# Patient Record
Sex: FEMALE | Race: WHITE | NOT HISPANIC OR LATINO | Employment: UNEMPLOYED | URBAN - METROPOLITAN AREA
[De-identification: names, ages, dates, MRNs, and addresses within clinical notes are randomized per-mention and may not be internally consistent; named-entity substitution may affect disease eponyms.]

---

## 2021-08-05 ENCOUNTER — OFFICE VISIT (OUTPATIENT)
Dept: LAB | Facility: HOSPITAL | Age: 51
End: 2021-08-05
Payer: COMMERCIAL

## 2021-08-05 ENCOUNTER — APPOINTMENT (OUTPATIENT)
Dept: LAB | Facility: HOSPITAL | Age: 51
End: 2021-08-05
Payer: COMMERCIAL

## 2021-08-05 ENCOUNTER — OFFICE VISIT (OUTPATIENT)
Dept: BARIATRICS | Facility: CLINIC | Age: 51
End: 2021-08-05
Payer: COMMERCIAL

## 2021-08-05 VITALS
BODY MASS INDEX: 45.99 KG/M2 | TEMPERATURE: 97.6 F | DIASTOLIC BLOOD PRESSURE: 108 MMHG | WEIGHT: 293 LBS | SYSTOLIC BLOOD PRESSURE: 146 MMHG | HEART RATE: 86 BPM | HEIGHT: 67 IN

## 2021-08-05 DIAGNOSIS — Z79.899 MEDICATION MANAGEMENT: ICD-10-CM

## 2021-08-05 DIAGNOSIS — Z91.89 AT RISK FOR SLEEP APNEA: ICD-10-CM

## 2021-08-05 DIAGNOSIS — E66.01 OBESITY, CLASS III, BMI 40-49.9 (MORBID OBESITY) (HCC): ICD-10-CM

## 2021-08-05 DIAGNOSIS — I10 ESSENTIAL HYPERTENSION: ICD-10-CM

## 2021-08-05 DIAGNOSIS — E66.01 OBESITY, CLASS III, BMI 40-49.9 (MORBID OBESITY) (HCC): Primary | ICD-10-CM

## 2021-08-05 LAB
ALBUMIN SERPL BCP-MCNC: 3.8 G/DL (ref 3.5–5)
ALP SERPL-CCNC: 64 U/L (ref 46–116)
ALT SERPL W P-5'-P-CCNC: 45 U/L (ref 12–78)
ANION GAP SERPL CALCULATED.3IONS-SCNC: 9 MMOL/L (ref 4–13)
AST SERPL W P-5'-P-CCNC: 22 U/L (ref 5–45)
BILIRUB SERPL-MCNC: 0.33 MG/DL (ref 0.2–1)
BUN SERPL-MCNC: 17 MG/DL (ref 5–25)
CALCIUM SERPL-MCNC: 8.4 MG/DL (ref 8.3–10.1)
CHLORIDE SERPL-SCNC: 105 MMOL/L (ref 100–108)
CHOLEST SERPL-MCNC: 307 MG/DL (ref 50–200)
CO2 SERPL-SCNC: 28 MMOL/L (ref 21–32)
CREAT SERPL-MCNC: 0.71 MG/DL (ref 0.6–1.3)
EST. AVERAGE GLUCOSE BLD GHB EST-MCNC: 94 MG/DL
GFR SERPL CREATININE-BSD FRML MDRD: 99 ML/MIN/1.73SQ M
GLUCOSE P FAST SERPL-MCNC: 146 MG/DL (ref 65–99)
HBA1C MFR BLD: 4.9 %
HDLC SERPL-MCNC: 51 MG/DL
INSULIN SERPL-ACNC: 39.9 MU/L (ref 3–25)
LDLC SERPL CALC-MCNC: 209 MG/DL (ref 0–100)
MAGNESIUM SERPL-MCNC: 1.9 MG/DL (ref 1.6–2.6)
NONHDLC SERPL-MCNC: 256 MG/DL
POTASSIUM SERPL-SCNC: 4.5 MMOL/L (ref 3.5–5.3)
PROT SERPL-MCNC: 8 G/DL (ref 6.4–8.2)
SODIUM SERPL-SCNC: 142 MMOL/L (ref 136–145)
TRIGL SERPL-MCNC: 233 MG/DL
TSH SERPL DL<=0.05 MIU/L-ACNC: 1.61 UIU/ML (ref 0.36–3.74)

## 2021-08-05 PROCEDURE — 99204 OFFICE O/P NEW MOD 45 MIN: CPT | Performed by: PHYSICIAN ASSISTANT

## 2021-08-05 PROCEDURE — 83735 ASSAY OF MAGNESIUM: CPT

## 2021-08-05 PROCEDURE — 1036F TOBACCO NON-USER: CPT | Performed by: PHYSICIAN ASSISTANT

## 2021-08-05 PROCEDURE — 3008F BODY MASS INDEX DOCD: CPT | Performed by: PHYSICIAN ASSISTANT

## 2021-08-05 PROCEDURE — 83525 ASSAY OF INSULIN: CPT

## 2021-08-05 PROCEDURE — 93005 ELECTROCARDIOGRAM TRACING: CPT

## 2021-08-05 PROCEDURE — 84443 ASSAY THYROID STIM HORMONE: CPT

## 2021-08-05 PROCEDURE — 80053 COMPREHEN METABOLIC PANEL: CPT

## 2021-08-05 PROCEDURE — 36415 COLL VENOUS BLD VENIPUNCTURE: CPT

## 2021-08-05 PROCEDURE — 83036 HEMOGLOBIN GLYCOSYLATED A1C: CPT

## 2021-08-05 PROCEDURE — 80061 LIPID PANEL: CPT

## 2021-08-05 RX ORDER — METOPROLOL SUCCINATE 50 MG/1
50 TABLET, EXTENDED RELEASE ORAL DAILY
COMMUNITY
End: 2022-02-01 | Stop reason: SDUPTHER

## 2021-08-05 NOTE — ASSESSMENT & PLAN NOTE
-Discussed options of Healthy[facility]-Intensive Lifestyle Intervention Program, Very Low Calorie Diet-VLCD, Conservative Program, Shaila-En-Y Gastric Bypass and Vertical Sleeve Gastrectomy and the role of weight loss medications   -Initial weight loss goal of 5-10% weight loss for improved health  -Screening labs  Recommend checking lab coverage before having labs drawn    -NEEDS Lipids, tsh, a1c, fasting insulin and cmp   - STOP BANG-4/8  -Patient is interested in pursuing Very Low Calorie Diet-VLCD    VLCD Review:  Contraindications: no  Labs ordered: yes  EKG ordered: yes  HTN meds addressed: will monitor prn   DM2 meds addressed: n/a  VLCD time restriction based on BMI: no

## 2021-08-05 NOTE — ASSESSMENT & PLAN NOTE
Taking toprol  -should improve with weight loss, dietary, and lifestyle changes  -continue management with prescribing provider

## 2021-08-05 NOTE — PROGRESS NOTES
Assessment/Plan:    Obesity, Class III, BMI 40-49 9 (morbid obesity) ([facility] [address] )  -Discussed options of Healthy[facility]-Intensive Lifestyle Intervention Program, Very Low Calorie Diet-VLCD, Conservative Program, Shaila-En-Y Gastric Bypass and Vertical Sleeve Gastrectomy and the role of weight loss medications   -Initial weight loss goal of 5-10% weight loss for improved health  -Screening labs  Recommend checking lab coverage before having labs drawn  -NEEDS Lipids, tsh, a1c, fasting insulin and cmp   - STOP BANG-  -Patient is interested in pursuing Very Low Calorie Diet-VLCD    VLCD Review:  Contraindications: no  Labs ordered: yes  EKG ordered: yes  HTN meds addressed: will monitor prn   DM2 meds addressed: n/a  VLCD time restriction based on BMI: no        At risk for sleep apnea  STOP BAN/8  - Patient declined sleep medicine referral  - will recheck in 3-4 months after weight loss has occured  -Discussed risks of untreated sleep apnea such as sudden cardiac death by arrhythmia, uncontrolled hypertension, difficulty with weight loss, decreased quality sleep, increased insulin resistance, and stroke  -Should improve with dietary and lifestyle changes        Essential hypertension  Taking toprol  -should improve with weight loss, dietary, and lifestyle changes  -continue management with prescribing provider        Follow up in approximately 2 weeks with Non-Surgical Dietician  Diagnoses and all orders for this visit:    Obesity, Class III, BMI 40-49 9 (morbid obesity) ([facility])  -     TSH, 3rd generation with Free T4 reflex; Future  -     Lipid panel; Future  -     HEMOGLOBIN A1C W/ EAG ESTIMATION; Future  -     Insulin, fasting; Future  -     Comprehensive metabolic panel; Future  -     Magnesium; Future  -     ECG 12 lead; Future    At risk for sleep apnea  -     TSH, 3rd generation with Free T4 reflex; Future  -     Lipid panel; Future  -     HEMOGLOBIN A1C W/ EAG ESTIMATION; Future  -     Insulin, fasting;  Future  - Comprehensive metabolic panel; Future  -     Magnesium; Future  -     ECG 12 lead; Future    Medication management  -     TSH, 3rd generation with Free T4 reflex; Future  -     Lipid panel; Future  -     HEMOGLOBIN A1C W/ EAG ESTIMATION; Future  -     Insulin, fasting; Future  -     Comprehensive metabolic panel; Future  -     Magnesium; Future  -     ECG 12 lead; Future    Essential hypertension    Other orders  -     metoprolol succinate (TOPROL-XL) 50 mg 24 hr tablet; Take 50 mg by mouth daily          Subjective:   Chief Complaint   Patient presents with    Consult     mwm follow up       Patient ID: Laxmi Fernandes  is a 45 y o  female with excess weight/obesity here to pursue weight management  History reviewed  No pertinent past medical history  HPI:  Obesity/Excess Weight:  Severity: Severe  Onset:  For her whole life but worse in the last 3 years caring for her  with als who has since passed     Modifiers: Diet and Exercise, Commercial Weight Loss Programs-ie  Weight Watchers, Annmarie Fraise, Nutrisystem, etc  and Over the Counter Weight Loss Supplements  Contributing factors: Poor Food Choices and Stress/Emotional Eating  Associated symptoms: comorbid conditions, fatigue, increased joint pain, decreased exercise capacity, decreased mobility and inability to do certain activities    Goals: be size 10/12  Hydration:  oz of water-adds crystal light occasionally   Alcohol: 2-3 drinks per week     Colonoscopy-Not Completed -getting referral to pcp     The following portions of the patient's history were reviewed and updated as appropriate: allergies, current medications, past family history, past medical history, past social history, past surgical history and problem list     Review of Systems   HENT: Negative for sore throat  Respiratory: Negative for cough and shortness of breath  Cardiovascular: Negative for chest pain and palpitations     Gastrointestinal: Negative for abdominal pain, constipation, diarrhea, nausea and vomiting  Denies GERD   Endocrine: Negative for cold intolerance and heat intolerance  Genitourinary: Negative for dysuria  Musculoskeletal: Positive for back pain  Negative for arthralgias  Skin: Negative for rash  Neurological: Negative for headaches  Psychiatric/Behavioral: Negative for suicidal ideas (denies HI)  Denies Depression and anxiety       Objective:    BP (!) 146/108 (BP Location: Left arm, Patient Position: Sitting, Cuff Size: Extra-Large)   Pulse 86   Temp 97 6 °F (36 4 °C)   Ht 5' 7" (1 702 m)   Wt (!) 139 kg (307 lb 3 2 oz)   BMI 48 11 kg/m²     Physical Exam  Vitals and nursing note reviewed  Constitutional   General appearance: Abnormal   well developed and morbidly obese  Eyes No conjunctival pallor  Pulmonary   Respiratory effort: No increased work of breathing or signs of respiratory distress  Abdomen   Abdomen: Abnormal   The abdomen was obese     Musculoskeletal   Gait and station: Normal     Psychiatric   Orientation to person, place and time: Normal     Affect: appropriate

## 2021-08-05 NOTE — ASSESSMENT & PLAN NOTE
STOP BAN/8  - Patient declined sleep medicine referral  - will recheck in 3-4 months after weight loss has occured  -Discussed risks of untreated sleep apnea such as sudden cardiac death by arrhythmia, uncontrolled hypertension, difficulty with weight loss, decreased quality sleep, increased insulin resistance, and stroke    -Should improve with dietary and lifestyle changes

## 2021-08-06 LAB
ATRIAL RATE: 66 BPM
P AXIS: 53 DEGREES
PR INTERVAL: 166 MS
QRS AXIS: 28 DEGREES
QRSD INTERVAL: 92 MS
QT INTERVAL: 410 MS
QTC INTERVAL: 429 MS
T WAVE AXIS: 23 DEGREES
VENTRICULAR RATE: 66 BPM

## 2021-08-06 PROCEDURE — 93010 ELECTROCARDIOGRAM REPORT: CPT | Performed by: INTERNAL MEDICINE

## 2021-08-09 ENCOUNTER — TELEPHONE (OUTPATIENT)
Dept: BARIATRICS | Facility: CLINIC | Age: 51
End: 2021-08-09

## 2021-08-12 ENCOUNTER — OFFICE VISIT (OUTPATIENT)
Dept: BARIATRICS | Facility: CLINIC | Age: 51
End: 2021-08-12

## 2021-08-12 VITALS — WEIGHT: 293 LBS | BODY MASS INDEX: 45.99 KG/M2 | HEIGHT: 67 IN

## 2021-08-12 DIAGNOSIS — R63.5 ABNORMAL WEIGHT GAIN: Primary | ICD-10-CM

## 2021-08-12 PROCEDURE — VLCD

## 2021-08-12 PROCEDURE — RECHECK

## 2021-08-12 NOTE — PROGRESS NOTES
Reviewed VLCD diet principles  Developed meal plan and reviewed product use  Ordered 2 week supply  Gave patient written VLCD education packet and left patient contact number  Will call contact patient in 2-3 days to assess tolerance  She plans on starting VLCD tomorrow

## 2021-08-14 NOTE — PROGRESS NOTES
Called patient to follow-up on tolerance of VLCD Diet  Patient states they are doing well  Tolerating meal replacements without difficulty  Consuming at least 80oz of water in addition to water used to mix replacements  Patient not experiencing constipation  She had a headache the first day or two, but feeling much better now  Will follow up in 2 weeks

## 2021-08-16 ENCOUNTER — OFFICE VISIT (OUTPATIENT)
Dept: BARIATRICS | Facility: CLINIC | Age: 51
End: 2021-08-16

## 2021-08-16 DIAGNOSIS — R63.5 ABNORMAL WEIGHT GAIN: Primary | ICD-10-CM

## 2021-08-16 PROCEDURE — RECHECK

## 2021-08-17 ENCOUNTER — RA CDI HCC (OUTPATIENT)
Dept: OTHER | Facility: HOSPITAL | Age: 51
End: 2021-08-17

## 2021-08-17 NOTE — PROGRESS NOTES
[facility], [place] [facility]  coding opportunities       Chart reviewed, no opportunity found: CHART REVIEWED, NO OPPORTUNITY FOUND                        Patients insurance company: [employer] (Medicare and Commercial for [employer] and [employer])

## 2021-08-17 NOTE — PROGRESS NOTES
Weight Management Medical Nutrition Assessment  Pt presented for a VLCD follow-up session  Today's weight is  294 6#  Pt has lost 11 8# in the past 2 weeks and overall has lost 11 8# in the past 2 weeks(3 9% TBW,  averaging   5 9#/per week)  Tolerating meal replacements without difficulty  Consuming at least 80oz of water in addition to the the water used to mix replacements  Patient not experiencing constipation  Labs ordered  Products ordered  Informed about sale day  Exercise guidelines explained  Reviewed keto snacks  Blood pressure taken  Will continue on VLCD  Patient seen by Medical Provider in past 6 months:  yes  Requested to schedule appointment with Medical Provider: No      Anthropometric Measurements  Start Weight (#): 306 4#  Current Weight (#): 294 6#  TBW % Change from start weight: 3 9%  Ideal Body Weight (#): 135#  Goal Weight (#): doesn't have one    Weight Loss History  Previous weight loss attempts: none    Food and Nutrition Related History  Wake up: 6:30AM   Bed Time: 10-10:30PM    Food Recall  Breakfast: (7-9 AM): meal replacement   Snack: (12PM): protein bar  Lunch: (4-5PM meal replacement  Snack: ---  Dinner: (7PM): meal replacement  Snack:---      Beverages: water  Volume of beverage intake: > oz water (crystal light)    Weekends: Same  Cravings: none  Trouble area of day: none, but did move daughter away to college so was hard while away    Frequency of Eating out: n/a  Food restrictions: none  Cooking: self   Food Shopping: self    Physical Activity Intake  Activity:None (first 2 wks of VLCD); Reviewed exercise guidelines and keto snack list -- plans on starting to walk  Frequency:n/a  Physical limitations/barriers to exercise: none    Estimated Needs  Energy  [address] [address] Energy Needs:  BMR : 1984 kcal     1-2# loss weekly sedentary:  2627-0478 kcal              1-2# loss weekly lightly active: 3509-8952 kcal  Maintenance calories for sedentary activity level: 2381 kcal  Protein: 74- 92 grams     (1 2-1 5g/kg IBW)  Fluid: 2148 ml     (35mL/kg IBW)    Nutrition Diagnosis  Yes;     Overweight/obesity  related to Excess energy intake as evidenced by  BMI more than normative standard for age and sex (obesity-grade III 36+)       Nutrition Intervention    Nutrition Prescription  760 calories/ 43 net carbs using 3 meal replacements and 1 bar daily       Meal Plan (Sabas/Pro/Carb)  Breakfast: (7-9 AM): meal replacement   Snack: (12PM): protein bar  Lunch: (4-5PM meal replacement  Snack: ---  Dinner: (7PM): meal replacement  Snack:---    Nutrition Education:    Healthy Core Manual  Calorie controlled menu  Lean protein food choices  Healthy snack options  Food journaling tips      Nutrition Counseling:  Strategies: meal planning, portion sizes, healthy snack choices, hydration, fiber intake, protein intake, exercise, food journal      Monitoring and Evaluation:  Evaluation criteria:  Energy Intake  Meet protein needs  Maintain adequate hydration  Monitor weekly weight  Meal planning/preparation  Food journal   Decreased portions at mealtimes and snacks  Physical activity     Barriers to learning:none  Readiness to change: Action:  (Changing behavior)  Comprehension: very good  Expected Compliance: very good

## 2021-08-26 ENCOUNTER — OFFICE VISIT (OUTPATIENT)
Dept: BARIATRICS | Facility: CLINIC | Age: 51
End: 2021-08-26

## 2021-08-26 VITALS
BODY MASS INDEX: 45.99 KG/M2 | HEIGHT: 67 IN | DIASTOLIC BLOOD PRESSURE: 80 MMHG | SYSTOLIC BLOOD PRESSURE: 124 MMHG | WEIGHT: 293 LBS

## 2021-08-26 DIAGNOSIS — R63.5 ABNORMAL WEIGHT GAIN: Primary | ICD-10-CM

## 2021-08-26 PROCEDURE — RECHECK

## 2021-08-26 PROCEDURE — 3008F BODY MASS INDEX DOCD: CPT | Performed by: PHYSICIAN ASSISTANT

## 2021-08-26 PROCEDURE — VLCD

## 2021-09-09 ENCOUNTER — OFFICE VISIT (OUTPATIENT)
Dept: BARIATRICS | Facility: CLINIC | Age: 51
End: 2021-09-09

## 2021-09-09 VITALS
SYSTOLIC BLOOD PRESSURE: 140 MMHG | DIASTOLIC BLOOD PRESSURE: 78 MMHG | BODY MASS INDEX: 45.99 KG/M2 | WEIGHT: 293 LBS | HEIGHT: 67 IN

## 2021-09-09 DIAGNOSIS — R63.5 ABNORMAL WEIGHT GAIN: Primary | ICD-10-CM

## 2021-09-09 PROCEDURE — RECHECK

## 2021-09-09 PROCEDURE — 3008F BODY MASS INDEX DOCD: CPT | Performed by: PHYSICIAN ASSISTANT

## 2021-09-09 PROCEDURE — VLCD

## 2021-09-09 NOTE — PROGRESS NOTES
Weight Management Medical Nutrition Assessment  Pt presented for a VLCD follow-up session  Today's weight is  300 9#  Pt has gained  6 3# in the past 2 weeks and overall has lost 5 5# in the past 4 weeks(1 8% TBW,  averaging   ---#/per week)  She admits that she "went off the rails" when she went away for a few days  She started program again yesterday and would like to try again  She was tolerating meal replacements, drinking adequate fluid and no constipation/diarrhea  She did not complete labs, but will today  Products ordered  No exercise  Blood pressure taken, elevated, on medication  Will try again on VLCD      Patient seen by Medical Provider in past 6 months:  yes  Requested to schedule appointment with Medical Provider: No        Anthropometric Measurements  Start Weight (#): 306 4#  Current Weight (#): 300 9#  TBW % Change from start weight: 1 8%  Ideal Body Weight (#): 135#  Goal Weight (#): doesn't have one     Weight Loss History  Previous weight loss attempts: none     Food and Nutrition Related History  Wake up: 6:30AM         Bed Time: 10-10:30PM     Food Recall  Breakfast: (7:30 AM): meal replacement           Snack: (12PM): protein bar  Lunch: (4-5PM meal replacement  Snack: ---  Dinner: (8PM): meal replacement  Snack:---        Beverages: water  Volume of beverage intake: > oz water (crystal light)     Weekends: Same  Cravings: none  Trouble area of day: none, but did move daughter away to college so was hard while away     Frequency of Eating out: n/a  Food restrictions: none  Cooking: self   Food Shopping: self     Physical Activity Intake  Activity: no exercise, encouraged walking  Frequency:n/a  Physical limitations/barriers to exercise: none     Estimated Needs  Energy  [employer] [place] Energy Needs:  BMR : 2012 kcal     1-2# loss weekly sedentary:  5165-5711 kcal              1-2# loss weekly lightly active: 3972-2908 kcal  Maintenance calories for sedentary activity level: 2415 kcal  Protein: 74- 92 grams     (1 2-1 5g/kg IBW)  Fluid: 2148 ml     (35mL/kg IBW)     Nutrition Diagnosis  Yes;     Overweight/obesity  related to Excess energy intake as evidenced by  BMI more than normative standard for age and sex (obesity-grade III 36+)     Nutrition Intervention     Nutrition Prescription  760 calories/ 43 net carbs using 3 meal replacements and 1 bar daily         Meal Plan (Sabas/Pro/Carb)  Breakfast: (7-9 AM): meal replacement           Snack: (12PM): protein bar  Lunch: (4-5PM meal replacement  Snack: ---  Dinner: (7PM): meal replacement  Snack:---     Nutrition Education:    Healthy Core Manual  Calorie controlled menu  Lean protein food choices  Healthy snack options  Food journaling tips        Nutrition Counseling:  Strategies: meal planning, portion sizes, healthy snack choices, hydration, fiber intake, protein intake, exercise, food journal        Monitoring and Evaluation:  Evaluation criteria:  Energy Intake  Meet protein needs  Maintain adequate hydration  Monitor weekly weight  Meal planning/preparation  Food journal   Decreased portions at mealtimes and snacks  Physical activity      Barriers to learning:none  Readiness to change: Action:  (Changing behavior)  Comprehension: very good  Expected Compliance: very good

## 2022-01-19 ENCOUNTER — TELEPHONE (OUTPATIENT)
Dept: WOUND CARE | Facility: HOSPITAL | Age: 52
End: 2022-01-19

## 2022-01-19 NOTE — TELEPHONE ENCOUNTER
Looks like pt made appt online fopr new pt appt and it says new pt under reason    Can we call pt to see the nature of her appt

## 2022-02-01 ENCOUNTER — OFFICE VISIT (OUTPATIENT)
Dept: FAMILY MEDICINE CLINIC | Facility: CLINIC | Age: 52
End: 2022-02-01
Payer: COMMERCIAL

## 2022-02-01 VITALS
RESPIRATION RATE: 20 BRPM | OXYGEN SATURATION: 98 % | HEART RATE: 82 BPM | TEMPERATURE: 96.7 F | DIASTOLIC BLOOD PRESSURE: 96 MMHG | BODY MASS INDEX: 47.09 KG/M2 | SYSTOLIC BLOOD PRESSURE: 150 MMHG | WEIGHT: 293 LBS | HEIGHT: 66 IN

## 2022-02-01 DIAGNOSIS — Z00.00 WELL ADULT EXAM: Primary | ICD-10-CM

## 2022-02-01 DIAGNOSIS — Z11.59 NEED FOR HEPATITIS C SCREENING TEST: ICD-10-CM

## 2022-02-01 DIAGNOSIS — Z12.31 ENCOUNTER FOR SCREENING MAMMOGRAM FOR MALIGNANT NEOPLASM OF BREAST: ICD-10-CM

## 2022-02-01 DIAGNOSIS — Z12.11 SCREENING FOR COLON CANCER: ICD-10-CM

## 2022-02-01 DIAGNOSIS — F43.21 GRIEF: ICD-10-CM

## 2022-02-01 DIAGNOSIS — Z23 NEED FOR VACCINATION: ICD-10-CM

## 2022-02-01 DIAGNOSIS — I10 ESSENTIAL HYPERTENSION: ICD-10-CM

## 2022-02-01 DIAGNOSIS — Z12.4 SCREENING FOR CERVICAL CANCER: ICD-10-CM

## 2022-02-01 DIAGNOSIS — E66.01 OBESITY, CLASS III, BMI 40-49.9 (MORBID OBESITY) (HCC): ICD-10-CM

## 2022-02-01 DIAGNOSIS — Z13.6 SCREENING FOR CARDIOVASCULAR CONDITION: ICD-10-CM

## 2022-02-01 DIAGNOSIS — E66.01 MORBID OBESITY WITH BMI OF 45.0-49.9, ADULT (HCC): ICD-10-CM

## 2022-02-01 PROBLEM — Z91.89 AT RISK FOR SLEEP APNEA: Status: RESOLVED | Noted: 2021-08-05 | Resolved: 2022-02-01

## 2022-02-01 PROCEDURE — 3008F BODY MASS INDEX DOCD: CPT | Performed by: FAMILY MEDICINE

## 2022-02-01 PROCEDURE — 90471 IMMUNIZATION ADMIN: CPT

## 2022-02-01 PROCEDURE — 90715 TDAP VACCINE 7 YRS/> IM: CPT

## 2022-02-01 PROCEDURE — 99386 PREV VISIT NEW AGE 40-64: CPT | Performed by: FAMILY MEDICINE

## 2022-02-01 PROCEDURE — 1036F TOBACCO NON-USER: CPT | Performed by: FAMILY MEDICINE

## 2022-02-01 RX ORDER — AMLODIPINE BESYLATE 5 MG/1
5 TABLET ORAL DAILY
Qty: 90 TABLET | Refills: 1 | Status: SHIPPED | OUTPATIENT
Start: 2022-02-01 | End: 2022-07-18 | Stop reason: SDUPTHER

## 2022-02-01 RX ORDER — METOPROLOL SUCCINATE 50 MG/1
50 TABLET, EXTENDED RELEASE ORAL DAILY
Qty: 90 TABLET | Refills: 1 | Status: SHIPPED | OUTPATIENT
Start: 2022-02-01 | End: 2022-07-18 | Stop reason: SDUPTHER

## 2022-02-01 NOTE — PATIENT INSTRUCTIONS
Obesity   AMBULATORY CARE:   Obesity  is when your body mass index (BMI) is greater than 30  Your healthcare provider will use your height and weight to measure your BMI  The risks of obesity include  many health problems, including injuries or physical disability  You may need tests to check for the following:  · Diabetes    · High blood pressure or high cholesterol    · Heart disease    · Stroke    · Gallbladder or liver disease    · Cancer of the colon, breast, prostate, liver, or kidney    · Sleep apnea    · Arthritis or gout    Seek care immediately if:   · You have a severe headache, confusion, or difficulty speaking  · You have weakness on one side of your body  · You have chest pain, sweating, or shortness of breath  Call your doctor if:   · You have symptoms of gallbladder or liver disease, such as pain in your upper abdomen  · You have knee or hip pain and discomfort while walking  · You have symptoms of diabetes, such as intense hunger and thirst, and frequent urination  · You have symptoms of sleep apnea, such as snoring or daytime sleepiness  · You have questions or concerns about your condition or care  Treatment for obesity  focuses on helping you lose weight to improve your health  Even a small decrease in BMI can reduce the risk for many health problems  Your healthcare provider will help you set a weight-loss goal   · Lifestyle changes  are the first step in treating obesity  These include making healthy food choices and getting regular physical activity  Your healthcare provider may suggest a weight-loss program that involves coaching, education, and therapy  · Medicine  may help you lose weight when it is used with a healthy foods and physical activity  · Surgery  can help you lose weight if you are very obese and have other health problems  There are several types of weight-loss surgery  Ask your healthcare provider for more information      Be successful losing weight:   · Set small, realistic goals  An example of a small goal is to walk for 20 minutes 5 days a week  Anther goal is to lose 5% of your body weight  · Tell friends, family members, and coworkers about your goals  and ask for their support  Ask a friend to lose weight with you, or join a weight-loss support group  · Identify foods or triggers that may cause you to overeat , and find ways to avoid them  Remove tempting high-calorie foods from your home and workplace  Place a bowl of fresh fruit on your kitchen counter  If stress causes you to eat, then find other ways to cope with stress  A counselor or therapist may be able to help you  · Keep a diary to track what you eat and drink  Also write down how many minutes of physical activity you do each day  Weigh yourself once a week and record it in your diary  Eating changes: You will need to eat 500 to 1,000 fewer calories each day than you currently eat to lose 1 to 2 pounds a week  The following changes will help you cut calories:  · Eat smaller portions  Use small plates, no larger than 9 inches in diameter  Fill your plate half full of fruits and vegetables  Measure your food using measuring cups until you know what a serving size looks like  · Eat 3 meals and 1 or 2 snacks each day  Plan your meals in advance  Martha [place] and eat at home most of the time  Eat slowly  Do not skip meals  Skipping meals can lead to overeating later in the day  This can make it harder for you to lose weight  Talk with a dietitian to help you make a meal plan and schedule that is right for you  · Eat fruits and vegetables at every meal   They are low in calories and high in fiber, which makes you feel full  Do not add butter, margarine, or cream sauce to vegetables  Use herbs to season steamed vegetables  · Eat less fat and fewer fried foods  Eat more baked or grilled chicken and fish  These protein sources are lower in calories and fat than red meat  Limit fast food  Dress your salads with olive oil and vinegar instead of bottled dressing  · Limit the amount of sugar you eat  Do not drink sugary beverages  Limit alcohol  Activity changes:  Physical activity is good for your body in many ways  It helps you burn calories and build strong muscles  It decreases stress and depression, and improves your mood  It can also help you sleep better  Talk to your healthcare provider before you begin an exercise program   · Exercise for at least 30 minutes 5 days a week  Start slowly  Set aside time each day for physical activity that you enjoy and that is convenient for you  It is best to do both weight training and an activity that increases your heart rate, such as walking, bicycling, or swimming  · Find ways to be more active  Do yard work and housecleaning  Walk up the stairs instead of using elevators  Spend your leisure time going to events that require walking, such as outdoor festivals or fairs  This extra physical activity can help you lose weight and keep it off  Follow up with your doctor as directed: You may need to meet with a dietitian  Write down your questions so you remember to ask them during your visits  © Copyright [address] 2021 Information is for End User's use only and may not be sold, redistributed or otherwise used for commercial purposes  All illustrations and images included in CareNotes® are the copyrighted property of [employer]  or [employer] Is[employer]  The above information is an  only  It is not intended as medical advice for individual conditions or treatments  Talk to your doctor, nurse or pharmacist before following any medical regimen to see if it is safe and effective for you

## 2022-02-01 NOTE — PROGRESS NOTES
FAMILY PRACTICE HEALTH MAINTENANCE OFFICE VISIT  [facility]    NAME: Larissa Mckeon  AGE: 45 y o  SEX: female  : 1970     DATE: 2022    Assessment and Plan     1  Well adult exam    2  Encounter for screening mammogram for malignant neoplasm of breast  -     Mammo screening bilateral w 3d & cad; Future; Expected date: 2022    3  Screening for colon cancer  -     Ambulatory referral to Gastroenterology; Future    4  Essential hypertension  -     metoprolol succinate (TOPROL-XL) 50 mg 24 hr tablet; Take 1 tablet (50 mg total) by mouth daily  -     amLODIPine (NORVASC) 5 mg tablet; Take 1 tablet (5 mg total) by mouth daily    5  Obesity, Class III, BMI 40-49 9 (morbid obesity) ([facility] [address] )  -     Ambulatory referral to Bariatric Surgery; Future    6  Morbid obesity with BMI of 45 0-49 9, adult [facility])  -     Ambulatory referral to Bariatric Surgery; Future    7  Need for hepatitis C screening test  -     Hepatitis C antibody; Future    8  Screening for cardiovascular condition  -     Comprehensive metabolic panel; Future  -     Lipid Panel with Direct LDL reflex; Future    9  Grief    10  Need for vaccination  -     TDAP VACCINE GREATER THAN OR EQUAL TO 8YO IM    11  Screening for cervical cancer  -     Ambulatory referral to Obstetrics / Gynecology; Future        · Patient Counseling:   · Nutrition: Stressed importance of a well balanced diet, moderation of sodium/saturated fat, caloric balance and sufficient intake of fiber  · Exercise: Stressed the importance of regular exercise with a goal of 150 minutes per week  · Dental Health: Discussed daily flossing and brushing and regular dental visits     · Immunizations reviewed: See Orders  · Discussed benefits of:  Mammogram , Cervical Cancer screening and Screening labs   BMI Counseling: Body mass index is 49 07 kg/m²  Discussed with patient's BMI with her   The BMI is above normal  Nutrition recommendations include reducing portion sizes  Return in about 4 weeks (around 3/1/2022) for Recheck BP and labs  Chief Complaint     Chief Complaint   Patient presents with    Physical Exam     [address] [facility],[address] Patient Visit       History of Present Illness     Pt is here for the fisrt time sched for a full physical      Well Adult Physical   Patient here for a comprehensive physical exam       Diet and Physical Activity  Diet: well balanced diet  Exercise: never      Depression Screen  PHQ-2/9 Depression Screening    Little interest or pleasure in doing things: 3 - nearly every day  Feeling down, depressed, or hopeless: 3 - nearly every day  Trouble falling or staying asleep, or sleeping too much: 3 - nearly every day  Feeling tired or having little energy: 3 - nearly every day  Poor appetite or overeating: 3 - nearly every day  Feeling bad about yourself - or that you are a failure or have let yourself or your family down: 3 - nearly every day  Trouble concentrating on things, such as reading the newspaper or watching television: 3 - nearly every day  Moving or speaking so slowly that other people could have noticed  Or the opposite - being so fidgety or restless that you have been moving around a lot more than usual: 0 - not at all  Thoughts that you would be better off dead, or of hurting yourself in some way: 0 - not at all  PHQ-2 Score: 6  PHQ-2 Interpretation: POSITIVE depression screen  PHQ-9 Score: 21   PHQ-9 Interpretation: Severe depression           General Health  Hearing: Normal:  bilateral  Vision: wears glasses and wears contacts  Dental: regular dental visits    Reproductive Health  No issues       The following portions of the patient's history were reviewed and updated as appropriate: allergies, current medications, past family history, past medical history, past social history, past surgical history and problem list     Review of Systems     Review of Systems   Constitutional: Negative    Negative for activity change, appetite change, chills, diaphoresis and fatigue  HENT: Negative  Negative for dental problem, ear pain, sinus pressure and sore throat  Eyes: Negative  Negative for photophobia, pain, discharge, redness, itching and visual disturbance  Respiratory: Negative for apnea and chest tightness  Cardiovascular: Negative  Negative for chest pain, palpitations and leg swelling  Gastrointestinal: Negative  Negative for abdominal distention, abdominal pain, constipation and diarrhea  Endocrine: Negative  Negative for cold intolerance and heat intolerance  Genitourinary: Negative  Negative for difficulty urinating and dyspareunia  Musculoskeletal: Negative  Negative for arthralgias and back pain  Skin: Negative  Allergic/Immunologic: Negative for environmental allergies  Neurological: Negative  Negative for dizziness  Psychiatric/Behavioral: Negative  Negative for agitation  Past Medical History     History reviewed  No pertinent past medical history  Past Surgical History     Past Surgical History:   Procedure Laterality Date     SECTION      TONSILLECTOMY         Social History     Social History     Socioeconomic History    Marital status:       Spouse name: None    Number of children: None    Years of education: None    Highest education level: None   Occupational History    None   Tobacco Use    Smoking status: Never Smoker    Smokeless tobacco: Never Used   Vaping Use    Vaping Use: Never used   Substance and Sexual Activity    Alcohol use: None    Drug use: None    Sexual activity: None   Other Topics Concern    None   Social History Narrative    None     Social Determinants of Health     Financial Resource Strain: Not on file   Food Insecurity: Not on file   Transportation Needs: Not on file   Physical Activity: Not on file   Stress: Not on file   Social Connections: Not on file   Intimate Partner Violence: Not on file   Housing Stability: Not on file       Family History     Family History   Problem Relation Age of Onset    Hypertension Mother     Stroke Mother     Diabetes Father     Hypertension Father     Heart disease Father     Thyroid disease Neg Hx        Current Medications       Current Outpatient Medications:     metoprolol succinate (TOPROL-XL) 50 mg 24 hr tablet, Take 1 tablet (50 mg total) by mouth daily, Disp: 90 tablet, Rfl: 1    amLODIPine (NORVASC) 5 mg tablet, Take 1 tablet (5 mg total) by mouth daily, Disp: 90 tablet, Rfl: 1     Allergies     No Known Allergies    Objective     /96   Pulse 82   Temp (!) 96 7 °F (35 9 °C)   Resp 20   Ht 5' 6" (1 676 m)   Wt (!) 138 kg (304 lb)   SpO2 98%   BMI 49 07 kg/m²      Physical Exam  Vitals and nursing note reviewed  Constitutional:       General: She is not in acute distress  Appearance: She is well-developed  She is not diaphoretic  HENT:      Head: Normocephalic and atraumatic  Right Ear: External ear normal       Left Ear: External ear normal       Nose: Nose normal       Mouth/Throat:      Pharynx: No oropharyngeal exudate  Eyes:      General: No scleral icterus  Right eye: No discharge  Left eye: No discharge  Pupils: Pupils are equal, round, and reactive to light  Neck:      Thyroid: No thyromegaly  Cardiovascular:      Rate and Rhythm: Normal rate  Heart sounds: Normal heart sounds  No murmur heard  Pulmonary:      Effort: Pulmonary effort is normal  No respiratory distress  Breath sounds: Normal breath sounds  No wheezing  Abdominal:      General: Bowel sounds are normal  There is no distension  Palpations: Abdomen is soft  There is no mass  Tenderness: There is no abdominal tenderness  There is no guarding or rebound  Musculoskeletal:         General: Normal range of motion  Skin:     General: Skin is warm and dry  Findings: No erythema or rash     Neurological:      Mental Status: She is alert  Coordination: Coordination normal       Deep Tendon Reflexes: Reflexes normal    Psychiatric:         Behavior: Behavior normal             Visual Acuity Screening    Right eye Left eye Both eyes   Without correction:      With correction: 20/30 20/30 20/30           Andrea Cesar DO  [address]  BMI Counseling: Body mass index is 49 07 kg/m²  The BMI is above normal  Nutrition recommendations include reducing portion sizes

## 2022-02-22 ENCOUNTER — RA CDI HCC (OUTPATIENT)
Dept: OTHER | Facility: HOSPITAL | Age: 52
End: 2022-02-22

## 2022-02-22 NOTE — PROGRESS NOTES
[place] [id number]  coding opportunities             Chart Reviewed * (Number of) Sharita suggestions sent to Provider: 1      E66 01 Obesity, Class III, BMI 40-49 9 (morbid obesity) ([place] [address] )  *BMI>40     If this is correct, please document and assess at your next visit,3/1/2022            Patients insurance company: [address] [place] [address] (Medicare and Commercial for [employer] and [facility])

## 2022-03-07 ENCOUNTER — RA CDI HCC (OUTPATIENT)
Dept: OTHER | Facility: HOSPITAL | Age: 52
End: 2022-03-07

## 2022-03-17 ENCOUNTER — TELEPHONE (OUTPATIENT)
Dept: OTHER | Facility: OTHER | Age: 52
End: 2022-03-17

## 2022-03-17 NOTE — TELEPHONE ENCOUNTER
Appointment cancelled by patient  Patient advised to call back during normal office hours to reschedule

## 2022-03-30 ENCOUNTER — LAB (OUTPATIENT)
Dept: LAB | Facility: CLINIC | Age: 52
End: 2022-03-30
Payer: COMMERCIAL

## 2022-03-30 DIAGNOSIS — Z13.6 SCREENING FOR CARDIOVASCULAR CONDITION: ICD-10-CM

## 2022-03-30 DIAGNOSIS — Z11.59 NEED FOR HEPATITIS C SCREENING TEST: ICD-10-CM

## 2022-03-30 LAB
ALBUMIN SERPL BCP-MCNC: 3.9 G/DL (ref 3.5–5)
ALP SERPL-CCNC: 90 U/L (ref 46–116)
ALT SERPL W P-5'-P-CCNC: 39 U/L (ref 12–78)
ANION GAP SERPL CALCULATED.3IONS-SCNC: 6 MMOL/L (ref 4–13)
AST SERPL W P-5'-P-CCNC: 19 U/L (ref 5–45)
BILIRUB SERPL-MCNC: 0.32 MG/DL (ref 0.2–1)
BUN SERPL-MCNC: 16 MG/DL (ref 5–25)
CALCIUM SERPL-MCNC: 8.9 MG/DL (ref 8.3–10.1)
CHLORIDE SERPL-SCNC: 105 MMOL/L (ref 100–108)
CHOLEST SERPL-MCNC: 332 MG/DL
CO2 SERPL-SCNC: 26 MMOL/L (ref 21–32)
CREAT SERPL-MCNC: 0.68 MG/DL (ref 0.6–1.3)
GFR SERPL CREATININE-BSD FRML MDRD: 100 ML/MIN/1.73SQ M
GLUCOSE P FAST SERPL-MCNC: 210 MG/DL (ref 65–99)
HCV AB SER QL: NORMAL
HDLC SERPL-MCNC: 45 MG/DL
LDLC SERPL DIRECT ASSAY-MCNC: 180 MG/DL (ref 0–100)
POTASSIUM SERPL-SCNC: 4.2 MMOL/L (ref 3.5–5.3)
PROT SERPL-MCNC: 8.1 G/DL (ref 6.4–8.2)
SODIUM SERPL-SCNC: 137 MMOL/L (ref 136–145)
TRIGL SERPL-MCNC: 515 MG/DL

## 2022-03-30 PROCEDURE — 80053 COMPREHEN METABOLIC PANEL: CPT

## 2022-03-30 PROCEDURE — 83721 ASSAY OF BLOOD LIPOPROTEIN: CPT

## 2022-03-30 PROCEDURE — 36415 COLL VENOUS BLD VENIPUNCTURE: CPT

## 2022-03-30 PROCEDURE — 80061 LIPID PANEL: CPT

## 2022-03-30 PROCEDURE — 86803 HEPATITIS C AB TEST: CPT

## 2022-04-04 ENCOUNTER — OFFICE VISIT (OUTPATIENT)
Dept: FAMILY MEDICINE CLINIC | Facility: CLINIC | Age: 52
End: 2022-04-04
Payer: COMMERCIAL

## 2022-04-04 VITALS
HEIGHT: 66 IN | DIASTOLIC BLOOD PRESSURE: 90 MMHG | RESPIRATION RATE: 18 BRPM | BODY MASS INDEX: 47.09 KG/M2 | OXYGEN SATURATION: 98 % | SYSTOLIC BLOOD PRESSURE: 134 MMHG | WEIGHT: 293 LBS | HEART RATE: 78 BPM | TEMPERATURE: 98 F

## 2022-04-04 DIAGNOSIS — R73.09 ABNORMAL GLUCOSE: ICD-10-CM

## 2022-04-04 DIAGNOSIS — E78.2 MIXED HYPERLIPIDEMIA: ICD-10-CM

## 2022-04-04 DIAGNOSIS — I10 ESSENTIAL HYPERTENSION: Primary | ICD-10-CM

## 2022-04-04 PROCEDURE — 3725F SCREEN DEPRESSION PERFORMED: CPT | Performed by: FAMILY MEDICINE

## 2022-04-04 PROCEDURE — 99214 OFFICE O/P EST MOD 30 MIN: CPT | Performed by: FAMILY MEDICINE

## 2022-04-04 RX ORDER — ROSUVASTATIN CALCIUM 20 MG/1
20 TABLET, COATED ORAL DAILY
Qty: 90 TABLET | Refills: 1 | Status: SHIPPED | OUTPATIENT
Start: 2022-04-04 | End: 2022-06-28 | Stop reason: SDUPTHER

## 2022-04-04 RX ORDER — LISINOPRIL 10 MG/1
10 TABLET ORAL DAILY
Qty: 90 TABLET | Refills: 1 | Status: SHIPPED | OUTPATIENT
Start: 2022-04-04

## 2022-04-04 RX ORDER — ROSUVASTATIN CALCIUM 20 MG/1
20 TABLET, COATED ORAL DAILY
Qty: 30 TABLET | Refills: 5 | Status: SHIPPED | OUTPATIENT
Start: 2022-04-04 | End: 2022-04-04 | Stop reason: SDUPTHER

## 2022-04-04 NOTE — PROGRESS NOTES
Assessment/Plan:    1  Essential hypertension  -     lisinopril (ZESTRIL) 10 mg tablet; Take 1 tablet (10 mg total) by mouth daily    2  Abnormal glucose  -     Hemoglobin A1C; Future    3  Mixed hyperlipidemia  -     rosuvastatin (CRESTOR) 20 MG tablet; Take 1 tablet (20 mg total) by mouth daily  -     Comprehensive metabolic panel; Future; Expected date: 06/18/2022  -     Lipid Panel with Direct LDL reflex; Future; Expected date: 06/18/2022    Pt has an elevated fasting sugar - I will get an a1c  Lipoid meds started for cholesterol and I added an ace to bp meds  Pt advised on limiting carbs as well as fatty foods          There are no Patient Instructions on file for this visit  Return in about 3 months (around 7/4/2022) for Recheck bp and labs  Subjective:      Patient ID: Leif Serna is a 45 y o  female  Chief Complaint   Patient presents with    Follow-up     lab work  Shelley Coe       Pt is here to follow BP and labs  Pt states she feels well      The following portions of the patient's history were reviewed and updated as appropriate: allergies, current medications, past family history, past medical history, past social history, past surgical history and problem list     Review of Systems   Constitutional: Negative  Negative for activity change, appetite change, chills, diaphoresis and fatigue  HENT: Negative  Negative for dental problem, ear pain, sinus pressure and sore throat  Eyes: Negative  Negative for photophobia, pain, discharge, redness, itching and visual disturbance  Respiratory: Negative for apnea and chest tightness  Cardiovascular: Negative  Negative for chest pain, palpitations and leg swelling  Gastrointestinal: Negative  Negative for abdominal distention, abdominal pain, constipation and diarrhea  Endocrine: Negative  Negative for cold intolerance and heat intolerance  Genitourinary: Negative  Negative for difficulty urinating and dyspareunia  Musculoskeletal: Negative  Negative for arthralgias and back pain  Skin: Negative  Allergic/Immunologic: Negative for environmental allergies  Neurological: Negative  Negative for dizziness  Psychiatric/Behavioral: Negative  Negative for agitation  Current Outpatient Medications   Medication Sig Dispense Refill    amLODIPine (NORVASC) 5 mg tablet Take 1 tablet (5 mg total) by mouth daily 90 tablet 1    metoprolol succinate (TOPROL-XL) 50 mg 24 hr tablet Take 1 tablet (50 mg total) by mouth daily 90 tablet 1    lisinopril (ZESTRIL) 10 mg tablet Take 1 tablet (10 mg total) by mouth daily 90 tablet 1    rosuvastatin (CRESTOR) 20 MG tablet Take 1 tablet (20 mg total) by mouth daily 90 tablet 1     No current facility-administered medications for this visit  Objective:    /90   Pulse 78   Temp 98 °F (36 7 °C)   Resp 18   Ht 5' 6" (1 676 m)   Wt (!) 142 kg (312 lb)   SpO2 98%   BMI 50 36 kg/m²        Physical Exam  Vitals and nursing note reviewed  Constitutional:       General: She is not in acute distress  Appearance: She is well-developed  She is not diaphoretic  HENT:      Head: Normocephalic and atraumatic  Right Ear: External ear normal       Left Ear: External ear normal       Nose: Nose normal       Mouth/Throat:      Pharynx: No oropharyngeal exudate  Eyes:      General: No scleral icterus  Right eye: No discharge  Left eye: No discharge  Pupils: Pupils are equal, round, and reactive to light  Neck:      Thyroid: No thyromegaly  Cardiovascular:      Rate and Rhythm: Normal rate  Heart sounds: Normal heart sounds  No murmur heard  Pulmonary:      Effort: Pulmonary effort is normal  No respiratory distress  Breath sounds: Normal breath sounds  No wheezing  Abdominal:      General: Bowel sounds are normal  There is no distension  Palpations: Abdomen is soft  There is no mass  Tenderness:  There is no abdominal tenderness  There is no guarding or rebound  Musculoskeletal:         General: Normal range of motion  Skin:     General: Skin is warm and dry  Findings: No erythema or rash  Neurological:      Mental Status: She is alert        Coordination: Coordination normal       Deep Tendon Reflexes: Reflexes normal    Psychiatric:         Behavior: Behavior normal               Recent Results (from the past 672 hour(s))   Comprehensive metabolic panel    Collection Time: 03/30/22  9:00 AM   Result Value Ref Range    Sodium 137 136 - 145 mmol/L    Potassium 4 2 3 5 - 5 3 mmol/L    Chloride 105 100 - 108 mmol/L    CO2 26 21 - 32 mmol/L    ANION GAP 6 4 - 13 mmol/L    BUN 16 5 - 25 mg/dL    Creatinine 0 68 0 60 - 1 30 mg/dL    Glucose, Fasting 210 (H) 65 - 99 mg/dL    Calcium 8 9 8 3 - 10 1 mg/dL    AST 19 5 - 45 U/L    ALT 39 12 - 78 U/L    Alkaline Phosphatase 90 46 - 116 U/L    Total Protein 8 1 6 4 - 8 2 g/dL    Albumin 3 9 3 5 - 5 0 g/dL    Total Bilirubin 0 32 0 20 - 1 00 mg/dL    eGFR 100 ml/min/1 73sq m   Lipid Panel with Direct LDL reflex    Collection Time: 03/30/22  9:00 AM   Result Value Ref Range    Cholesterol 332 (H) See Comment mg/dL    Triglycerides 515 (H) See Comment mg/dL    HDL, Direct 45 (L) >=50 mg/dL    LDL Calculated     Hepatitis C antibody    Collection Time: 03/30/22  9:00 AM   Result Value Ref Range    Hepatitis C Ab Non-reactive Non-reactive   LDL cholesterol, direct    Collection Time: 03/30/22  9:00 AM   Result Value Ref Range    LDL Direct 180 (H) 0 - 100 mg/dl         Jacqueline Pineda DO

## 2022-04-13 ENCOUNTER — PREP FOR PROCEDURE (OUTPATIENT)
Dept: BARIATRICS | Facility: CLINIC | Age: 52
End: 2022-04-13

## 2022-04-13 ENCOUNTER — CLINICAL SUPPORT (OUTPATIENT)
Dept: BARIATRICS | Facility: CLINIC | Age: 52
End: 2022-04-13

## 2022-04-13 VITALS
DIASTOLIC BLOOD PRESSURE: 86 MMHG | HEIGHT: 67 IN | WEIGHT: 293 LBS | SYSTOLIC BLOOD PRESSURE: 142 MMHG | BODY MASS INDEX: 45.99 KG/M2 | HEART RATE: 76 BPM

## 2022-04-13 DIAGNOSIS — E66.01 MORBID (SEVERE) OBESITY DUE TO EXCESS CALORIES (HCC): Primary | ICD-10-CM

## 2022-04-13 DIAGNOSIS — Z98.84 BARIATRIC SURGERY STATUS: Primary | ICD-10-CM

## 2022-04-13 PROCEDURE — RECHECK

## 2022-04-13 NOTE — PROGRESS NOTES
Bariatric Nutrition Assessment Note    Type of surgery    Preop (3+1 weight checks)  Surgery Date: TBD  Surgeon: TBD    Nutrition Assessment   Heath Goodell  45 y o   female     Wt with BMI of 25: 159 3#  Pre-Op Excess Wt: 149 7#  Height 5' 7" (1 702 m), weight (!) 141 kg (309 lb 12 8 oz)  Body mass index is 48 52 kg/m²  Weight History   Onset of Obesity: Childhood, was over weight during high school and did what she needed to do keep the weight down  Once got  and pregnant gained th weight   Family history of obesity: No  Wt Loss Attempts: Commercial Programs ([employer]/Medical Referral Source, [employer] system, etc )  Counseling with  MD--meds  Exercise  High Protein/Low CHO diets (Atkins, Union, etc )  Meal Replacements (Medifast, Slim Fast, etc )  Nutrition Counseling with RD  Self Created Diets (Portion Control, Healthy Food Choices, etc )  VLCD at [facility] for 1 month    Maximum Wt Lost: 80lbs when was 26yrs old and did Phen Phen    Review of History and Medications   No past medical history on file  Past Surgical History:   Procedure Laterality Date     SECTION      TONSILLECTOMY       Social History     Socioeconomic History    Marital status:       Spouse name: Not on file    Number of children: Not on file    Years of education: Not on file    Highest education level: Not on file   Occupational History    Not on file   Tobacco Use    Smoking status: Never Smoker    Smokeless tobacco: Never Used   Vaping Use    Vaping Use: Never used   Substance and Sexual Activity    Alcohol use: Yes     Comment: rare    Drug use: Never    Sexual activity: Not on file   Other Topics Concern    Not on file   Social History Narrative    Not on file     Social Determinants of Health     Financial Resource Strain: Not on file   Food Insecurity: Not on file   Transportation Needs: Not on file   Physical Activity: Not on file   Stress: Not on file   Social Connections: Not on file   Intimate Partner Violence: Not on file   Housing Stability: Not on file       Current Outpatient Medications:     amLODIPine (NORVASC) 5 mg tablet, Take 1 tablet (5 mg total) by mouth daily, Disp: 90 tablet, Rfl: 1    lisinopril (ZESTRIL) 10 mg tablet, Take 1 tablet (10 mg total) by mouth daily, Disp: 90 tablet, Rfl: 1    metoprolol succinate (TOPROL-XL) 50 mg 24 hr tablet, Take 1 tablet (50 mg total) by mouth daily, Disp: 90 tablet, Rfl: 1    rosuvastatin (CRESTOR) 20 MG tablet, Take 1 tablet (20 mg total) by mouth daily, Disp: 90 tablet, Rfl: 1    Food Intake and Lifestyle Assessment   Food Intake Assessment completed via usual diet recall  Wake: 6am (ex-teacher)  Breakfast: 9:30am--toast and eggs and/or norssa yogurt  Snack: -   Lunch: not big on lunch  Snack: 3-4pm:  Cheese, fruit,   Dinner: 6pm- large portion in dinner:  [place] rice bowl OR meat, starch and veggies with a large portion of veggies  Last night went to sister's house and had a cocktail and cheese and crackers (not usual)  Snack: cookies or ice cream  Beverage intake: water, sugar free beverages, diet soda and alcohol  Protein supplement: Tried premier and Kathye Minors life  Estimated protein intake per day: 60gm  Estimated fluid intake per day: 64oz non-bibiana fluids, occasional alcohol  Meals eaten away from home: doing a lot more take out recently since lost  in 2020 and daughter in college--sushi w/noodles OR chicken parm sandwich, etc  Typical meal pattern: 2 meals per day and grazes on snacks per day  Eating Behaviors: Consumption of high calorie/ high fat foods, Large portion sizes, Frequent snacking/ grazing, Mindless eating, Emotional eating, Craves sweet foods and Craves salty foods  Food allergies or intolerances: No Known Allergies  Cultural or Rastafarian considerations: none    Physical Assessment  Physical Activity  Types of exercise: No structured exercise    Restoring a old home so that is her activity  Current physical limitations: none    Psychosocial Assessment   Support systems: friend(s) relative(s) children  Socioeconomic factors: none    Nutrition Diagnosis  Diagnosis: Overweight / Obesity (NC-3 3)  Related to: Physical inactivity and Excessive energy intake  As Evidenced by: BMI >25     Nutrition Prescription: Recommend the following diet  Regular    Interventions and Teaching   Discussed pre-op and post-op nutrition guidelines  Patient educated and handouts provided  Surgical changes to stomach / GI  Capacity of post-surgery stomach  Diet progression  Adequate hydration  Sugar and fat restriction to decrease "dumping syndrome"  Fat restriction to decrease steatorrhea  Expected weight loss  Weight loss plateaus/ possibility of weight regain  Exercise  Suggestions for pre-op diet  Nutrition considerations after surgery  Protein supplements  Meal planning and preparation  Appropriate carbohydrate, protein, and fat intake, and food/fluid choices to maximize safe weight loss, nutrient intake, and tolerance   Dietary and lifestyle changes  Possible problems with poor eating habits  Intuitive eating  Techniques for self monitoring and keeping daily food journal  Potential for food intolerance after surgery, and ways to deal with them including: lactose intolerance, nausea, reflux, vomiting, diarrhea, food intolerance, appetite changes, gas  Vitamin / Mineral supplementation of Multivitamin with minerals and Vitamin D    Education provided to: patient    Barriers to learning: No barriers identified  Readiness to change: preparation    Prior research on procedure: books and internet    Comprehension: verbalizes understanding     Expected Compliance: good    Recommendations  Pt is an appropriate candidate for surgery   Yes    Evaluation / Monitoring  Dietitian to Monitor: Eating pattern as discussed Tolerance of nutrition prescription Body weight Lab values Physical activity    Pre-op wt loss:  Do not gain  Lose 5% by DOS:  -15lbs (294#)    Goals  Food journal via [employer]  Exercise 30 minutes 5 times per week--start walking  Complete lesson plans 1-6  Eat 3 meals per day with protein at each meal  Eliminate mindless snacking   Work on 30/60 rule  Use protein shake as a meal replacement  Will provide lab rx at next weight check      Time Spent:   1 Hour

## 2022-04-13 NOTE — PROGRESS NOTES
Bariatric Behavioral Health Evaluation    Presenting Problem: Alfreda Margo,  1970  Patient saw her PCP for her yearly check up and he recommended [facility] with [facility]  Patient has tried Weight Watchers, [place] [facility], [facility], low carb, keto diet, weight loss medication, she would lose some weight but then plateau and then regain weight  Is the patient seeking Bariatric Surgery Eval? Yes  If yes how long have you researched this surgery option  Patient has been considering the surgery for several years and she feels nothing is stopping her from going forward now, she is on her own and has the time to invest in herself  She has done her own research on the surgery  Realizes Post- Op Requirements? Yes     Pre-morbid level of function and history of present illness: Patient has hypertension, blood sugars have been high, she has asthma and high cholesterol     Psychiatric/Psychological Treatment Diagnosis: Patient denies any mental health diagnosis or substance use disorder  She drinks 1-2 alcohol beverages a week and is a non-smoker  Outpatient Counselor: No, she did see a hoptace therapist after her  passed away     Psychiatrist No     Have you had Inpatient Treatment? No    Family Constellation (include relationship with each and Psych/Med HX)    Mother  tobacco use, Father  tobacco use, Siblings  obesity and Other  obesity and history of addiction    Domestic Violence No     Abuse History:  none    Social situations: Patient lives alone, she has one daughter in college  Her  passed away in 2020  Additional comments/stressors related to family/relationships/peer support: Patient struggles with weight and the stress it puts on their health, she still experiences some grief over the loss of her  but feels she's manages it well    Patient's sister and daughter know she's seeking surgery and are supportive  Physical/Psychological Assessment:     Appearance: appropriate  Sociability: average  Affect: appropriate  Mood: calm  Thought Process: coherent  Speech: normal  Content: no impairment  Orientation: person  Yes , place  Yes , time  Yes , normal attention span  Yes , normal memory  Yes  , decreased in concentration ability  No and normal judgement  Yes   Insight: emotional  good    Risk Assessment:     none    Recommendations: Recommended for surgery  yes and Patient meets the criteria to be a member of [facility] Bariatric surgery program      Risk of Harm to Self or Others: Patient denies any SI/HI, no audio or visual hallucinations    Observation: this interview    Access to weapons no     Based on the previous information, the client presents the following risk of harm to self or others: low    BARIATRIC SURGERY EDUCATION CHECKLIST    I have received education related to my bariatric surgery process and understand:    Patients may be required to complete a psychiatric evaluation and receive clearance for surgery from their psychiatrist     Patients who undergo weight loss surgery are at higher risk of increased mental health concerns and suicide attempts  Patients may be required to complete a full substance abuse evaluation and then complete all treatment recommendations prior to surgery  If diagnosis of abuse/dependence results, patient may be required to remain sober for one (1) year before having bariatric surgery  Patients on psychiatric medications should check with their provider to discuss psychiatric medications and the changes in absorption  Patient should discuss all time release medications with provider and take all medications as prescribed  The recommendation is that there is no use of  any tobacco products, Hookah or  vapes for the bariatric post-operation patient      Bariatric surgery patients should not consume alcohol as a post-operative patient as it may increase risk of numerous health conditions including but not limited to alcohol abuse and ulcers  There is a possibility of weight regain if patient does not follow all program guidelines and recommendations  Bariatric surgery patients should exercise thirty (30) to sixty (60) minutes per day to maintain post-surgical weight loss  Research indicates that bariatric patients are more successful when they see a therapist for up to two (2) years post-op  Patients will follow all medical and dietary recommendations provided  Patient will keep all scheduled appointments and follow up with their physician for a minimum of five (5) years  Patient will take all vitamins as recommended  Post-operative vitamins are life-long  Patient reviewed Bariatric Surgery Education Checklist and agrees they have received education on these issues   Note: Patient denies any mental health diagnosis or substance use disorder, she drinks 1-2 alcohol beverages a week and is a non-smoker  Risk of alcohol and tobacco use post op were discussed  Impact of hormones on female reproduction post-op were discussed  Patient is not currently pregnant and doesn't plan to become pregnant within one year of surgery  Patient has two meals and a later evening snacking, doesn't have much appetite for breakfast   Encouraged to decrease night time snacking in order to have breakfast and consuming her calories earlier  Also encouraged to practice stop, think, act to decrease emotional eating, finding non-food coping skills to manage  Patient is appropriate for surgery and recommended to meet with surgeon    Tricia Fregoso LCSW

## 2022-04-29 ENCOUNTER — OFFICE VISIT (OUTPATIENT)
Dept: BARIATRICS | Facility: CLINIC | Age: 52
End: 2022-04-29
Payer: COMMERCIAL

## 2022-04-29 VITALS
SYSTOLIC BLOOD PRESSURE: 142 MMHG | DIASTOLIC BLOOD PRESSURE: 92 MMHG | BODY MASS INDEX: 45.99 KG/M2 | HEART RATE: 66 BPM | HEIGHT: 67 IN | WEIGHT: 293 LBS

## 2022-04-29 DIAGNOSIS — Z12.11 SCREENING FOR COLON CANCER: ICD-10-CM

## 2022-04-29 DIAGNOSIS — E66.01 OBESITY, CLASS III, BMI 40-49.9 (MORBID OBESITY) (HCC): Primary | ICD-10-CM

## 2022-04-29 DIAGNOSIS — I10 ESSENTIAL HYPERTENSION: ICD-10-CM

## 2022-04-29 DIAGNOSIS — E78.2 MIXED HYPERLIPIDEMIA: ICD-10-CM

## 2022-04-29 PROCEDURE — 1036F TOBACCO NON-USER: CPT | Performed by: SURGERY

## 2022-04-29 PROCEDURE — 3008F BODY MASS INDEX DOCD: CPT | Performed by: SURGERY

## 2022-04-29 PROCEDURE — 99203 OFFICE O/P NEW LOW 30 MIN: CPT | Performed by: SURGERY

## 2022-04-29 NOTE — PROGRESS NOTES
BARIATRIC INITIAL CONSULT - BARIATRIC SURGERY    Jake Gutierrez 45 y o  female MRN: [id number]  Unit/Bed#:  Encounter: [id number]      HPI:  Jake Gutierrez is a very pleasant 45 y o  female who presents with a longstanding history of morbid obesity and inability to sustain a meaningful weight loss  Here today to discuss bariatric options  She is a former   Body mass index is 48 8 kg/m²  ++Suffers from BRAEDEN suspected, HTN, HLD  S/p  (Pfannenstiel)      Visit type: consultation     Symptoms: excess weight, weight increase, inability to loss weight and fatigue    Associated Symptoms: none    Associated Conditions: hyperlipidemia  Disease Complications: hypertension  Weight Loss Interest: high  Previous Diet Trials: low carb    Exercise Frequency:infrequency  Types of Exercise: walking      Review of Systems   Constitutional: Negative  Respiratory: Negative  Cardiovascular: Negative  Gastrointestinal: Negative  Musculoskeletal: Negative  Neurological: Negative  All other systems reviewed and are negative  Historical Information   History reviewed  No pertinent past medical history    Past Surgical History:   Procedure Laterality Date     SECTION      TONSILLECTOMY       Social History   Social History     Substance and Sexual Activity   Alcohol Use Yes    Comment: rare     Social History     Substance and Sexual Activity   Drug Use Never     Social History     Tobacco Use   Smoking Status Never Smoker   Smokeless Tobacco Never Used     Family History:   Family History   Problem Relation Age of Onset    Hypertension Mother     Stroke Mother     Diabetes Father     Hypertension Father     Heart disease Father     Thyroid disease Neg Hx        Meds/Allergies   all medications and allergies reviewed  No Known Allergies    Objective       Current Vitals:   /92 (BP Location: Left arm, Patient Position: Sitting, Cuff Size: Large) Pulse 66   Ht 5' 7" (1 702 m)   Wt (!) 141 kg (311 lb 9 6 oz)   BMI 48 80 kg/m²       Physical Exam  Vitals reviewed  Constitutional:       Appearance: She is well-developed  HENT:      Head: Normocephalic  Eyes:      Extraocular Movements: Extraocular movements intact  Cardiovascular:      Rate and Rhythm: Normal rate  Pulmonary:      Effort: Pulmonary effort is normal    Abdominal:      General: There is no distension  Musculoskeletal:         General: Normal range of motion  Cervical back: Normal range of motion  Neurological:      Mental Status: She is alert and oriented to person, place, and time  Psychiatric:         Mood and Affect: Mood normal          Behavior: Behavior normal          Thought Content: Thought content normal          Judgment: Judgment normal          Lab Results: I have personally reviewed pertinent lab results  Imaging: I have personally reviewed pertinent reports  EKG, Pathology, and Other Studies: I have personally reviewed pertinent reports  Assessment/PLAN:    45 y o  yo female with a long standing h/o of obesity and inability to sustain any meaningful weight loss on her own despite several attempts  She is interested in the Laparoscopic stella-en-y gastric bypass possible sleeve gastrectomy  ++Suffers from BRAEDEN suspected, HTN, HLD  S/p  (Pfannenstiel)    I have explained our Enhanced Recovery After Bariatric Surgery (ERABS) protocol and benefits including preoperative, intraoperative and postoperative elements  As a part of her pre op evaluation, she will be referred to a cardiologist and for a sleep evaluation and consult  She needs an EGD to evaluate the anatomy of her GI tract prior to the operation  I have spent over 45 minutes with her face to face in the office today discussing her options and details of the surgery   We have seen an animation of the surgery on the computer that illustrates how the operation is done and how the anatomy will be altered with the procedure  Over 50% of this was coordinating care  She was given the opportunity to ask questions and I have answered all of them  I have discussed and educated the patient with regards to the components of our multidisciplinary program and the importance of compliance and follow up in the post operative period  The patient was also instructed with regards to the importance of behavior modification, nutritional counseling, support meeting attendance and lifestyle changes that are important to ensure success  Although there is a great statistical chance of improvement or even resolution of most of her associated comorbidities, the results vary from patient to patient and they largely depend on her commitment and compliance       Valene Homans, MD, FACS, [facility]  4/29/2022  9:03 AM

## 2022-05-13 ENCOUNTER — APPOINTMENT (EMERGENCY)
Dept: RADIOLOGY | Facility: HOSPITAL | Age: 52
End: 2022-05-13
Payer: COMMERCIAL

## 2022-05-13 ENCOUNTER — HOSPITAL ENCOUNTER (EMERGENCY)
Facility: HOSPITAL | Age: 52
Discharge: HOME | End: 2022-05-13
Attending: EMERGENCY MEDICINE
Payer: COMMERCIAL

## 2022-05-13 VITALS
HEIGHT: 67 IN | BODY MASS INDEX: 45.99 KG/M2 | TEMPERATURE: 98.7 F | WEIGHT: 293 LBS | OXYGEN SATURATION: 94 % | RESPIRATION RATE: 18 BRPM | HEART RATE: 83 BPM | DIASTOLIC BLOOD PRESSURE: 93 MMHG | SYSTOLIC BLOOD PRESSURE: 156 MMHG

## 2022-05-13 DIAGNOSIS — M54.9 BACK PAIN: ICD-10-CM

## 2022-05-13 DIAGNOSIS — M25.561 ACUTE PAIN OF RIGHT KNEE: Primary | ICD-10-CM

## 2022-05-13 PROBLEM — E78.00 HIGH CHOLESTEROL: Status: ACTIVE | Noted: 2022-05-13

## 2022-05-13 PROCEDURE — 73564 X-RAY EXAM KNEE 4 OR MORE: CPT

## 2022-05-13 PROCEDURE — 99284 EMERGENCY DEPT VISIT MOD MDM: CPT | Performed by: PHYSICIAN ASSISTANT

## 2022-05-13 PROCEDURE — 97116 GAIT TRAINING THERAPY: CPT

## 2022-05-13 PROCEDURE — 96372 THER/PROPH/DIAG INJ SC/IM: CPT

## 2022-05-13 PROCEDURE — 97163 PT EVAL HIGH COMPLEX 45 MIN: CPT

## 2022-05-13 PROCEDURE — 99284 EMERGENCY DEPT VISIT MOD MDM: CPT

## 2022-05-13 RX ORDER — PREDNISONE 20 MG/1
60 TABLET ORAL DAILY
Qty: 15 TABLET | Refills: 0 | Status: SHIPPED | OUTPATIENT
Start: 2022-05-13 | End: 2022-05-18

## 2022-05-13 RX ORDER — ACETAMINOPHEN 325 MG/1
650 TABLET ORAL ONCE
Status: COMPLETED | OUTPATIENT
Start: 2022-05-13 | End: 2022-05-13

## 2022-05-13 RX ORDER — KETOROLAC TROMETHAMINE 30 MG/ML
15 INJECTION, SOLUTION INTRAMUSCULAR; INTRAVENOUS ONCE
Status: COMPLETED | OUTPATIENT
Start: 2022-05-13 | End: 2022-05-13

## 2022-05-13 RX ORDER — METHOCARBAMOL 500 MG/1
500 TABLET, FILM COATED ORAL 2 TIMES DAILY
Qty: 20 TABLET | Refills: 0 | Status: SHIPPED | OUTPATIENT
Start: 2022-05-13 | End: 2022-06-15

## 2022-05-13 RX ADMIN — ACETAMINOPHEN 650 MG: 325 TABLET, FILM COATED ORAL at 12:56

## 2022-05-13 RX ADMIN — KETOROLAC TROMETHAMINE 15 MG: 30 INJECTION, SOLUTION INTRAMUSCULAR at 12:57

## 2022-05-13 NOTE — PHYSICAL THERAPY NOTE
PHYSICAL THERAPY EVALUATION/TREATMENT     05/13/22 1410   Note Type   Note type Evaluation   Pain Assessment   Pain Assessment Tool 0-10   Pain Score 10 - Worst Possible Pain   Pain Location/Orientation Orientation: Right;Location: Knee  (Posterior knee with weight bearing;2/10 at rest)   Restrictions/Precautions   Other Precautions Pain; Fall Risk   Home Living   Type of [facility] Two level;Bed/bath upstairs;1/2 bath on main level;Stairs to enter with rails  (3 JEFF)   Home Equipment   (no DME per pt)   Prior Function   Level of [place] Independent with ADLs and functional mobility   Lives With Daughter   Receives Help From Family   ADL Assistance Independent   IADLs Independent   Comments Pt normally amb w/out an AD PTA   General   Additional Pertinent History Pt seen in the ED by PT  Pt reports LBP and R hip/LE pain x 1 month  ED visit due to pt putting her shoe on and heard a pop in her R knee - now with painful weight bearing  Imaging (-)  Family/Caregiver Present Yes  (pt's dtr)   Cognition   Overall Cognitive Status WFL   Arousal/Participation Cooperative   Orientation Level Oriented X4   Following Commands Follows all commands and directions without difficulty   RLE Assessment   RLE Assessment WFL   LLE Assessment   LLE Assessment WFL   Bed Mobility   Supine to Sit 7  Independent   Sit to Supine 7  Independent   Additional Comments Pt uses UEs to assist RLE   Transfers   Sit to Stand 5  Supervision   Additional items Verbal cues   Stand to Sit 5  Supervision   Additional items Verbal cues   Ambulation/Elevation   Gait pattern   (max antalgic RLE w/out AD)   Gait Assistance 4  Minimal assist   Additional items Assist x 1;Verbal cues; Tactile cues  (pt reaching out to hold walls and furniture)   Assistive Device None;Rolling walker   Distance 5 feet w/out AD;12 feet with RW and change in direction   Balance   Static Sitting Good   Static Standing Fair +  (w RW)   Dynamic Standing Fair  (w RW)   Ambulatory Fair  (w RW)   Activity Tolerance   Activity Tolerance Patient limited by pain   Assessment   Problem List Decreased strength;Decreased range of motion;Decreased endurance; Impaired balance;Decreased mobility; Decreased safety awareness;Pain   Assessment Patient seen for Physical Therapy evaluation  Patient admitted with R knee pain  Comorbidities affecting patient's physical performance include: obesity, HTN, grief, HLD  Personal factors affecting patient at time of initial evaluation include: lives in two story house, stairs to enter home, inability to navigate community distances, inability to navigate level surfaces without external assistance and inability to perform dynamic tasks in community  Prior to admission, patient was independent with functional mobility without assistive device, independent with ADLS, independent with IADLS, living with dtr in a two level home with 3 steps to enter, ambulating household distance and ambulating community distances  Please find objective findings from Physical Therapy assessment regarding body systems outlined above with impairments and limitations including weakness, decreased ROM, impaired balance, decreased endurance, impaired coordination, gait deviations, pain, decreased activity tolerance, decreased functional mobility tolerance, decreased safety awareness and fall risk  The Barthel Index was used as a functional outcome tool presenting with a score of Barthel Index Score: 75 today indicating moderate limitations of functional mobility and ADLS  Patient's clinical presentation is currently unstable/unpredictable as seen in patient's presentation of vital sign response, changing level of pain, increased fall risk, new onset of impairment of functional mobility, decreased endurance and new onset of weakness   Pt would benefit from continued Physical Therapy treatment to address deficits as defined above and maximize level of functional mobility  As demonstrated by objective findings, the assigned level of complexity for this evaluation is high  The patient's AM-PAC Basic Mobility Inpatient Short Form Raw Score is 20  A Raw score of greater than 16 suggests the patient may benefit from discharge to home  Please also refer to the recommendation of the Physical Therapist for safe discharge planning  Goals   Patient Goals less pain and walk normally again   STG Expiration Date 05/20/22   Short Term Goal #1 trans - I; pt will amb with a RW functional household distances - I   Short Term Goal #2 balance wwith the RW will be F+; up/down 3 steps with a rail and cane - S so pt can enter/exit her home   LTG Expiration Date 05/27/22   Long Term Goal #1 Pt will return to independent gait and functional mobility, w/wout AD, functional household distances and community distances   Long Term Goal #2 up/down full flight of steps with rail - I so pt can access all areas of her home; balance - G   Plan   Treatment/Interventions ADL retraining;Functional transfer training;LE strengthening/ROM; Therapeutic exercise; Endurance training;Patient/family training;Equipment eval/education; Bed mobility;Gait training; Compensatory technique education   PT Frequency Other (Comment)  (5x/wk)   Recommendation   PT Discharge Recommendation No rehabilitation needs   Additional Comments Pt has Ortho MD visit on 5/25/22 - pending ortho MD recommendations, pt may benefit from OP PT   AM-PAC Basic Mobility Inpatient   Turning in Bed Without Bedrails 4   Lying on Back to Sitting on Edge of Flat Bed 4   Moving Bed to Chair 3   Standing Up From Chair 3   Walk in Room 3   Climb 3-5 Stairs 3   Basic Mobility Inpatient Raw Score 20   Basic Mobility Standardized Score 43 99   Highest Level Of Mobility   JH-HLM Goal 6: Walk 10 steps or more   JH-HLM Achieved 6: Walk 10 steps or more   Barthel Index   Feeding 10   Bathing 5   Grooming Score 5   Dressing Score 10   Bladder Score 10   Bowels Score 10   Toilet Use Score 10   Transfers (Bed/Chair) Score 10   Mobility (Level Surface) Score 0   Stairs Score 5   Barthel Index Score 75   Additional Treatment Session   Start Time 1410   End Time 1420   Treatment Assessment S: "It feels much better with the RW" O: Pt trans sit to stand - I and amb with the RW and S/I 40 feet with change in direction  Pt trans stand to sit - I  Pt amb 5 feet with SPC with close S  Steps verbally explained to pt and pt's dtr with use of the rail and cane  A: Gait much improved and less painful to the R knee with use of the RW vs no AD or cane  Pt will benefit from cont gait trainig with the RW with progression to cane and eventually no AD  Pt is safe for dc home from the ED with a RW and cane  ED provided RW to pt and PT provided cane  All DME adjusted for pt   Pt and pt's dtr with good understanding of gait with RW and cane/rail for steps   End of Consult   Patient Position at End of Consult All needs within reach   [employer] [place] Insurance Number  [place] [id number]

## 2022-05-13 NOTE — ED PROVIDER NOTES
History  Chief Complaint   Patient presents with    Leg Pain     Patient arrives AO x 4 with c/o right sided leg pain  Patient reports pain over a few weeks, started in hip and has radiated down leg  Patient reports "pop in knee yesterday while putting on shoe "  Patient taking flexeril and Advil with no relief  Patient unable to be seen by ortho at this time   Knee Pain     Patient is a 50-year-old female with past medical history significant for asthma, hypertension, high cholesterol, obesity, who presents today for evaluation right-sided leg pain  Over the past few weeks patient has been having low back pain with radiation to the right hip  Yesterday she was balancing on her right leg putting on a shoe and heard a pop  Since that time she has been having worsening knee pain and difficulty ambulating  Patient trialed Advil without improvement in her symptoms  Patient has been unable to follow-up with orthopedics due to scarcity of available appointments        History provided by:  Patient  Knee Pain  Location:  Leg  Time since incident:  1 day  Injury: no    Leg location:  R leg  Pain details:     Quality:  Aching, throbbing, sharp and shooting    Radiates to:  Does not radiate    Severity:  Severe    Onset quality:  Sudden    Duration:  1 day    Timing:  Constant    Progression:  Worsening  Chronicity:  New  Dislocation: no    Foreign body present:  No foreign bodies  Prior injury to area:  No  Relieved by:  Nothing  Worsened by:  Nothing  Ineffective treatments:  NSAIDs and acetaminophen  Associated symptoms: decreased ROM and swelling    Associated symptoms: no back pain, no fatigue, no fever, no itching, no muscle weakness, no neck pain, no numbness, no stiffness and no tingling    Swelling:     Location:  Leg    Onset quality:  Sudden    Duration:  1 day    Timing:  Constant    Progression:  Worsening    Chronicity:  New  Risk factors: obesity    Risk factors: no concern for non-accidental trauma, no frequent fractures, no known bone disorder and no recent illness        Prior to Admission Medications   Prescriptions Last Dose Informant Patient Reported? Taking? amLODIPine (NORVASC) 5 mg tablet 2022 at Unknown time  No Yes   Sig: Take 1 tablet (5 mg total) by mouth daily   lisinopril (ZESTRIL) 10 mg tablet 2022 at Unknown time  No Yes   Sig: Take 1 tablet (10 mg total) by mouth daily   metoprolol succinate (TOPROL-XL) 50 mg 24 hr tablet 2022 at Unknown time  No Yes   Sig: Take 1 tablet (50 mg total) by mouth daily   rosuvastatin (CRESTOR) 20 MG tablet 2022 at Unknown time  No Yes   Sig: Take 1 tablet (20 mg total) by mouth daily      Facility-Administered Medications: None       Past Medical History:   Diagnosis Date    Asthma     High cholesterol     Hypertension        Past Surgical History:   Procedure Laterality Date     SECTION      TONSILLECTOMY         Family History   Problem Relation Age of Onset    Hypertension Mother     Stroke Mother     Diabetes Father     Hypertension Father     Heart disease Father     Thyroid disease Neg Hx      I have reviewed and agree with the history as documented  E-Cigarette/Vaping    E-Cigarette Use Never User      E-Cigarette/Vaping Substances    Nicotine No     THC No     CBD No     Flavoring No     Other No     Unknown No      Social History     Tobacco Use    Smoking status: Never Smoker    Smokeless tobacco: Never Used   Vaping Use    Vaping Use: Never used   Substance Use Topics    Alcohol use: Yes     Comment: rare    Drug use: Never       Review of Systems   Constitutional: Negative for chills, fatigue and fever  HENT: Negative for ear pain and sore throat  Eyes: Negative for pain and visual disturbance  Respiratory: Negative for cough and shortness of breath  Cardiovascular: Negative for chest pain and palpitations  Gastrointestinal: Negative for abdominal pain and vomiting  Endocrine: Negative  Genitourinary: Negative  Negative for dysuria and hematuria  Musculoskeletal: Positive for gait problem and joint swelling  Negative for arthralgias, back pain, neck pain and stiffness  Skin: Negative for color change, itching and rash  Allergic/Immunologic: Negative  Neurological: Negative for seizures and syncope  Hematological: Negative  Psychiatric/Behavioral: Negative  All other systems reviewed and are negative  Physical Exam  Physical Exam  Vitals and nursing note reviewed  Constitutional:       General: She is not in acute distress  Appearance: She is well-developed  HENT:      Head: Normocephalic and atraumatic  Right Ear: External ear normal       Left Ear: External ear normal       Nose: Nose normal       Mouth/Throat:      Mouth: Mucous membranes are moist    Eyes:      General: No scleral icterus  Right eye: No discharge  Left eye: No discharge  Conjunctiva/sclera: Conjunctivae normal    Cardiovascular:      Rate and Rhythm: Normal rate and regular rhythm  Heart sounds: No murmur heard  Pulmonary:      Effort: Pulmonary effort is normal  No respiratory distress  Breath sounds: Normal breath sounds  Abdominal:      Palpations: Abdomen is soft  Tenderness: There is no abdominal tenderness  Musculoskeletal:         General: Swelling and tenderness present  No signs of injury  Cervical back: Neck supple  Right knee: Effusion present  No bony tenderness  Decreased range of motion  Tenderness present over the patellar tendon  Legs:    Skin:     General: Skin is warm and dry  Capillary Refill: Capillary refill takes less than 2 seconds  Neurological:      General: No focal deficit present  Mental Status: She is alert           Vital Signs  ED Triage Vitals [05/13/22 1230]   Temperature Pulse Respirations Blood Pressure SpO2   98 7 °F (37 1 °C) 83 18 156/93 94 %      Temp Source Heart Rate Source Patient Position - Orthostatic VS BP Location FiO2 (%)   Oral Monitor Sitting Right arm --      Pain Score       2           Vitals:    05/13/22 1230   BP: 156/93   Pulse: 83   Patient Position - Orthostatic VS: Sitting         Visual Acuity      ED Medications  Medications   ketorolac (TORADOL) injection 15 mg (15 mg Intramuscular Given 5/13/22 1257)   acetaminophen (TYLENOL) tablet 650 mg (650 mg Oral Given 5/13/22 1256)       Diagnostic Studies  Results Reviewed     None                 XR knee 4+ views Right injury   Final Result by Zahida Villarreal MD (05/13 4961)      No acute osseous abnormality              Workstation performed: [id number]                    Procedures  Procedures         ED Course                                             MDM  Number of Diagnoses or Management Options  Acute pain of right knee: new and requires workup  Back pain: new and requires workup  Diagnosis management comments: Pt with chronic back pain and acute pain of right knee  Improvement in symptoms with toredol administration  Xray without acute pathology  Discharged with steroid burst and robaxin  Follow up with Orthopedics for further evaluation  Patient educated on red flag symptoms that would necessitate return to the ED       Amount and/or Complexity of Data Reviewed  Clinical lab tests: reviewed  Tests in the radiology section of CPT®: ordered and reviewed  Tests in the medicine section of CPT®: reviewed  Decide to obtain previous medical records or to obtain history from someone other than the patient: yes  Review and summarize past medical records: yes  Independent visualization of images, tracings, or specimens: yes    Risk of Complications, Morbidity, and/or Mortality  Presenting problems: moderate  Diagnostic procedures: moderate  Management options: moderate    Patient Progress  Patient progress: stable      Disposition  Final diagnoses:   Acute pain of right knee   Back pain     Time reflects when diagnosis was documented in both MDM as applicable and the Disposition within this note     Time User Action Codes Description Comment    5/13/2022  2:21 PM Marlow Sprinkle Add [M25 561] Acute pain of right knee     5/13/2022  2:21 PM Gurpreet Sprinkle Add [M54 9] Back pain       ED Disposition     ED Disposition   Discharge    Condition   Stable    Date/Time   Fri May 13, 2022  2:29 PM    Comment   Bobby Desairaza discharge to home/self care  Follow-up Information     Follow up With Specialties Details Why Contact Info    Gerber Dailey DO Family Medicine, Wound Care Call  As needed [facility] [address] Vanna Wiggins MD Orthopedic Surgery Schedule an appointment as soon as possible for a visit  knee pain - back pain [address]  [phone]            Discharge Medication List as of 5/13/2022  2:48 PM      START taking these medications    Details   methocarbamol (ROBAXIN) 500 mg tablet Take 1 tablet (500 mg total) by mouth in the morning and 1 tablet (500 mg total) in the evening , Starting Fri 5/13/2022, Normal      predniSONE 20 mg tablet Take 3 tablets (60 mg total) by mouth in the morning for 5 days  , Starting Fri 5/13/2022, Until Wed 5/18/2022, Normal         CONTINUE these medications which have NOT CHANGED    Details   amLODIPine (NORVASC) 5 mg tablet Take 1 tablet (5 mg total) by mouth daily, Starting Tue 2/1/2022, Normal      lisinopril (ZESTRIL) 10 mg tablet Take 1 tablet (10 mg total) by mouth daily, Starting Mon 4/4/2022, Normal      metoprolol succinate (TOPROL-XL) 50 mg 24 hr tablet Take 1 tablet (50 mg total) by mouth daily, Starting Tue 2/1/2022, Normal      rosuvastatin (CRESTOR) 20 MG tablet Take 1 tablet (20 mg total) by mouth daily, Starting Mon 4/4/2022, Normal             No discharge procedures on file      PDMP Review     None          ED Provider  Electronically Signed by           Rebekah Mann PA-C  05/15/22 8962

## 2022-05-17 ENCOUNTER — OFFICE VISIT (OUTPATIENT)
Dept: BARIATRICS | Facility: CLINIC | Age: 52
End: 2022-05-17

## 2022-05-17 VITALS — WEIGHT: 293 LBS | BODY MASS INDEX: 45.99 KG/M2 | HEIGHT: 67 IN

## 2022-05-17 DIAGNOSIS — E66.01 OBESITY, CLASS III, BMI 40-49.9 (MORBID OBESITY) (HCC): ICD-10-CM

## 2022-05-17 DIAGNOSIS — I10 ESSENTIAL HYPERTENSION: ICD-10-CM

## 2022-05-17 DIAGNOSIS — Z01.818 PRE-OP TESTING: Primary | ICD-10-CM

## 2022-05-17 DIAGNOSIS — E78.2 MIXED HYPERLIPIDEMIA: ICD-10-CM

## 2022-05-17 PROCEDURE — RECHECK

## 2022-05-17 NOTE — PROGRESS NOTES
Bariatric Nutrition Assessment Note    Type of surgery    Preop (3+1 weight checks)--2 of 3 wt check today  Surgery Date: TBD  Surgeon: Dr Emely Tan on 22    Nutrition Assessment   Jose Luis Zhong  45 y o   female     Wt with BMI of 25: 159 3#  Pre-Op Excess Wt: 149 7#  Height 5' 7" (1 702 m), weight (!) 142 kg (312 lb 6 4 oz)  Body mass index is 48 93 kg/m²  Weight History   Onset of Obesity: Childhood, was over weight during high school and did what she needed to do keep the weight down  Once got  and pregnant gained th weight   Family history of obesity: No  Wt Loss Attempts: Commercial Programs ([employer], [employer], etc )  Counseling with  MD--meds  Exercise  High Protein/Low CHO diets (Atkins, Union, etc )  Meal Replacements (Medifast, Slim Fast, etc )  Nutrition Counseling with RD  Self Created Diets (Portion Control, Healthy Food Choices, etc )  [facility]    Maximum Wt Lost: 80lbs when was 26yrs old and did Phen Phen    Review of History and Medications   Past Medical History:   Diagnosis Date    Asthma     High cholesterol     Hypertension      Past Surgical History:   Procedure Laterality Date     SECTION      TONSILLECTOMY       Social History     Socioeconomic History    Marital status:       Spouse name: Not on file    Number of children: Not on file    Years of education: Not on file    Highest education level: Not on file   Occupational History    Not on file   Tobacco Use    Smoking status: Never Smoker    Smokeless tobacco: Never Used   Vaping Use    Vaping Use: Never used   Substance and Sexual Activity    Alcohol use: Yes     Comment: rare    Drug use: Never    Sexual activity: Not on file   Other Topics Concern    Not on file   Social History Narrative    Not on file     Social Determinants of Health     Financial Resource Strain: Not on file   Food Insecurity: Not on file   Transportation Needs: Not on file Physical Activity: Not on file   Stress: Not on file   Social Connections: Not on file   Intimate Partner Violence: Not on file   Housing Stability: Not on file       Current Outpatient Medications:     amLODIPine (NORVASC) 5 mg tablet, Take 1 tablet (5 mg total) by mouth daily, Disp: 90 tablet, Rfl: 1    lisinopril (ZESTRIL) 10 mg tablet, Take 1 tablet (10 mg total) by mouth daily, Disp: 90 tablet, Rfl: 1    methocarbamol (ROBAXIN) 500 mg tablet, Take 1 tablet (500 mg total) by mouth in the morning and 1 tablet (500 mg total) in the evening , Disp: 20 tablet, Rfl: 0    metoprolol succinate (TOPROL-XL) 50 mg 24 hr tablet, Take 1 tablet (50 mg total) by mouth daily, Disp: 90 tablet, Rfl: 1    predniSONE 20 mg tablet, Take 3 tablets (60 mg total) by mouth in the morning for 5 days  , Disp: 15 tablet, Rfl: 0    rosuvastatin (CRESTOR) 20 MG tablet, Take 1 tablet (20 mg total) by mouth daily, Disp: 90 tablet, Rfl: 1    Food Intake and Lifestyle Assessment   Food Intake Assessment completed via usual diet recall  Wake: 6am (ex-teacher)  Injured knee and now walking with a cane so has been on steroids for the past 5 days and seeing ortho next week  Today is the last day of the prednisone  Hasn't made too many changes due to injury  Breakfast: 9:30am--toast and eggs and/or norssa yogurt  Nothing today or cereal (life cereal)  Snack: -   Lunch: not big on lunch  Snack: 3-4pm:  Cheese, fruit,   Dinner: 6pm- salmon bowl with rice, salmon, avocado and sarachia sauce  large portion in dinner:  Gaston rice bowl OR meat, starch and veggies with a large portion of veggies  Last night went to sister's house and had a cocktail and cheese and crackers (not usual)  Snack: cookies last night--feels hungrier at this time due to the prednisone      Beverage intake: water, sugar free beverages, diet soda and alcohol  Protein supplement: Tried premier and [place] life  Estimated protein intake per day: 60gm  Estimated fluid intake per day: 64oz non-bibiana fluids, occasional alcohol  Meals eaten away from home: doing a lot more take out recently since lost  in 2020 and daughter in college--sushi w/noodles OR chicken parm sandwich, etc  Typical meal pattern: 2 meals per day and grazes on snacks per day  Eating Behaviors: Consumption of high calorie/ high fat foods, Large portion sizes, Frequent snacking/ grazing, Mindless eating, Emotional eating, Craves sweet foods and Craves salty foods  Food allergies or intolerances: No Known Allergies  Cultural or Synagogue considerations: none    Physical Assessment  Physical Activity  Types of exercise: No structured exercise  Restoring a old home so that is her activity  Current physical limitations: none    Psychosocial Assessment   Support systems: friend(s) relative(s) children  Socioeconomic factors: none    Nutrition Diagnosis  Diagnosis: Overweight / Obesity (NC-3 3)  Related to: Physical inactivity and Excessive energy intake  As Evidenced by: BMI >25     Nutrition Prescription: Recommend the following diet  Regular    Interventions and Teaching   Discussed pre-op and post-op nutrition guidelines  Patient educated and handouts provided    Surgical changes to stomach / GI  Capacity of post-surgery stomach  Diet progression  Adequate hydration  Sugar and fat restriction to decrease "dumping syndrome"  Fat restriction to decrease steatorrhea  Expected weight loss  Weight loss plateaus/ possibility of weight regain  Exercise  Suggestions for pre-op diet  Nutrition considerations after surgery  Protein supplements  Meal planning and preparation  Appropriate carbohydrate, protein, and fat intake, and food/fluid choices to maximize safe weight loss, nutrient intake, and tolerance   Dietary and lifestyle changes  Possible problems with poor eating habits  Intuitive eating  Techniques for self monitoring and keeping daily food journal  Potential for food intolerance after surgery, and ways to deal with them including: lactose intolerance, nausea, reflux, vomiting, diarrhea, food intolerance, appetite changes, gas  Vitamin / Mineral supplementation of Multivitamin with minerals and Vitamin D    Education provided to: patient    Barriers to learning: No barriers identified  Readiness to change: preparation    Prior research on procedure: books and internet    Comprehension: verbalizes understanding     Expected Compliance: good    Recommendations  Pt is an appropriate candidate for surgery  Yes    Evaluation / Monitoring  Dietitian to Monitor: Eating pattern as discussed Tolerance of nutrition prescription Body weight Lab values Physical activity    Pre-op wt loss:  Do not gain  Lose 5% by DOS:  -15lbs (294#)    Workflow:  Shelley Rainey Psych and/or D+A Clearance: n/a   PCP Letter: completed    Support Group: incompleted   Surgeon Appt  Dr Sadia Gifford on 4/29/22   EGD scheduled on 5/20/22   Cardiac Risk Assessment: will schedule   Sleep Studies scheduled for 7/20/22   Blood work gave rx at 5/17/22 appt--getting done for PCP appt in June   Nicotine test n/a   Pre-Operative Program: 2 of 3 +1 90 days after surgeon appt on 4/29/22    Pt present today with some weight gain  She injured her knee last week and has been on 5 days of Prednisone  She does feel the Prednisone has effected her appetite likely contributing to some weight gain  Reviewed diet recall and she does appear to still be skipping meals, mostly lunch  Encouraged 3 meals per day and can use a protein shake as a meal replacement for lunch  Today we reviewed portions and calories  Discussed that 1/3 cu of rice or pasta or potato is 80 cals therefore one cup is 240cals  Encouraged more non-starchy veggies and less carbs      Goals  · Food journal via [address]  · Complete lesson plans 1-6  · Eat 3 meals per day with protein at each meal  · Eliminate mindless snacking   · Work on 30/60 rule  · Use protein shake as a meal replacement for lunch  · Provided lab rx today that she can complete when she gets her blood work done for her PCP  · 1/2 plate non-starch veggies and limit to 1/2 cup carbs    Time Spent:   30 mins

## 2022-05-20 ENCOUNTER — TELEPHONE (OUTPATIENT)
Dept: OTHER | Facility: OTHER | Age: 52
End: 2022-05-20

## 2022-05-27 ENCOUNTER — OFFICE VISIT (OUTPATIENT)
Dept: OBGYN CLINIC | Facility: CLINIC | Age: 52
End: 2022-05-27
Payer: COMMERCIAL

## 2022-05-27 VITALS
DIASTOLIC BLOOD PRESSURE: 80 MMHG | HEIGHT: 67 IN | SYSTOLIC BLOOD PRESSURE: 130 MMHG | BODY MASS INDEX: 45.99 KG/M2 | WEIGHT: 293 LBS

## 2022-05-27 DIAGNOSIS — M23.91 INTERNAL DERANGEMENT OF RIGHT KNEE: Primary | ICD-10-CM

## 2022-05-27 DIAGNOSIS — M25.561 ACUTE PAIN OF RIGHT KNEE: ICD-10-CM

## 2022-05-27 PROCEDURE — 99203 OFFICE O/P NEW LOW 30 MIN: CPT | Performed by: ORTHOPAEDIC SURGERY

## 2022-05-27 PROCEDURE — 3008F BODY MASS INDEX DOCD: CPT | Performed by: ORTHOPAEDIC SURGERY

## 2022-05-27 PROCEDURE — 1036F TOBACCO NON-USER: CPT | Performed by: ORTHOPAEDIC SURGERY

## 2022-05-27 NOTE — PROGRESS NOTES
Assessment/Plan:  1  Internal derangement of right knee  MRI knee right  wo contrast   2  Acute pain of right knee  MRI knee right  wo contrast     Scribe Attestation    I,:  Chelita Willis am acting as a scribe while in the presence of the attending physician :       I,:  Shanna Randolph DO personally performed the services described in this documentation    as scribed in my presence :         Angie Thomas is a pleasant 45year old who presents to the office today for an initial evaluation of her right knee  Upon review of the right knee x-ray's, a thorough history and my examination, Angie Thomas is presenting with signs and symptoms consistent with a possible lateral meniscus tear of her right knee  She has failed to see pain relief in the forms of prednisone and the use of over-the-counter anti-inflammatories  At this time, I recommend an MRI of her right knee for further evaluation of a possible lateral meniscus tear  A right knee MRI was ordered at today's visit  I discussed with patient that I will call her with the results of the MRI and if there is a tear of the lateral meniscus I will refer her to see my partner Dr Jann Hines  I discussed with the patient that she may use Tylenol and Aleve together for pain relief  She understood and had no further questions  Subjective: Initial evaluation of right knee    Patient ID: Andry Canchola is a 45 y o  female who presents to the office today for an initial evaluation of her right knee  She states that on 05/12/2022, she was trying to put on her left shoe when she felt a pop over the lateral aspect of her right knee  She states that she was seen in the ED on 05/13/2022 and was prescribed prednisone  She states that the prednisone did help alleviate some pain and swelling about her right knee  She states that she will experience a daily intermittent dull pain located over the lateral aspect of her right knee    She states that her pain is exacerbated with ambulating down the steps  She denies any locking  She states that she has been using Advil to help alleviate her pain  She denies any previous injuries to her right knee  Review of Systems   Constitutional: Negative for chills, fever and unexpected weight change  HENT: Negative for hearing loss, nosebleeds and sore throat  Eyes: Negative for pain, redness and visual disturbance  Respiratory: Negative for cough, shortness of breath and wheezing  Cardiovascular: Negative for chest pain, palpitations and leg swelling  Gastrointestinal: Negative for abdominal pain, nausea and vomiting  Endocrine: Negative for polyphagia and polyuria  Genitourinary: Negative for dysuria and hematuria  Musculoskeletal: Positive for arthralgias and joint swelling  Skin: Negative for rash and wound  Neurological: Negative for dizziness, numbness and headaches  Psychiatric/Behavioral: Negative for confusion and suicidal ideas  The patient is not nervous/anxious            Past Medical History:   Diagnosis Date    Asthma     Broken tooth     upper right-back    High cholesterol     Hypertension     Obesity        Past Surgical History:   Procedure Laterality Date     SECTION      LAPAROSCOPY  1986    TONSILLECTOMY      WISDOM TOOTH EXTRACTION         Family History   Problem Relation Age of Onset    Hypertension Mother     Stroke Mother     Diabetes Father     Hypertension Father     Heart disease Father     Thyroid disease Neg Hx        Social History     Occupational History    Not on file   Tobacco Use    Smoking status: Never Smoker    Smokeless tobacco: Never Used   Vaping Use    Vaping Use: Never used   Substance and Sexual Activity    Alcohol use: Yes     Comment: rare    Drug use: Never    Sexual activity: Not on file         Current Outpatient Medications:     amLODIPine (NORVASC) 5 mg tablet, Take 1 tablet (5 mg total) by mouth daily (Patient taking differently: Take 5 mg by mouth every morning), Disp: 90 tablet, Rfl: 1    ibuprofen (MOTRIN) 200 mg tablet, Take 600 mg by mouth every 6 (six) hours as needed for mild pain, Disp: , Rfl:     lisinopril (ZESTRIL) 10 mg tablet, Take 1 tablet (10 mg total) by mouth daily (Patient taking differently: Take 10 mg by mouth every morning), Disp: 90 tablet, Rfl: 1    methocarbamol (ROBAXIN) 500 mg tablet, Take 1 tablet (500 mg total) by mouth in the morning and 1 tablet (500 mg total) in the evening , Disp: 20 tablet, Rfl: 0    metoprolol succinate (TOPROL-XL) 50 mg 24 hr tablet, Take 1 tablet (50 mg total) by mouth daily (Patient taking differently: Take 50 mg by mouth every morning), Disp: 90 tablet, Rfl: 1    rosuvastatin (CRESTOR) 20 MG tablet, Take 1 tablet (20 mg total) by mouth daily (Patient taking differently: Take 20 mg by mouth every morning), Disp: 90 tablet, Rfl: 1    vitamin E, tocopherol, 400 units capsule, Take 400 Units by mouth in the morning , Disp: , Rfl:     No Known Allergies    Objective:  Vitals:    05/27/22 1420   BP: 130/80       Body mass index is 48 71 kg/m²  Right Knee Exam     Tenderness   Right knee tenderness location: Posterior lateral joint line  Range of Motion   Extension: 0   Flexion: 110     Tests   José:  Medial - negative Lateral - positive  Varus: negative   Drawer:  Anterior - negative    Posterior - negative    Other   Erythema: absent  Scars: absent  Sensation: normal  Pulse: present  Swelling: none  Effusion: effusion present    Comments:  Positive Apley's laterally  No erythema, warmth or signs of infections  Knee is stable on ligamentous exam at 0, 30, 90 degrees  Neurovascularly intact             Observations     Right Knee   Positive for effusion  Physical Exam  Vitals reviewed  Constitutional:       Appearance: Normal appearance  She is well-developed  HENT:      Head: Normocephalic and atraumatic  Eyes:      General:         Right eye: No discharge           Left eye: No discharge  Extraocular Movements: Extraocular movements intact  Conjunctiva/sclera: Conjunctivae normal    Cardiovascular:      Rate and Rhythm: Normal rate  Pulmonary:      Effort: Pulmonary effort is normal  No respiratory distress  Musculoskeletal:      Cervical back: Normal range of motion and neck supple  Right knee: Effusion present  Instability Tests: Lateral José test positive  Medial José test negative  Skin:     General: Skin is warm and dry  Neurological:      General: No focal deficit present  Mental Status: She is alert and oriented to person, place, and time  Psychiatric:         Mood and Affect: Mood normal          Behavior: Behavior normal          I have personally reviewed pertinent films in PACS  X-rays performed on 05/13/2022 of her right knee demonstrates age-appropriate degenerative changes  There are no acute fractures, dislocations, lytic or blastic lesions

## 2022-06-03 ENCOUNTER — TELEPHONE (OUTPATIENT)
Dept: BARIATRICS | Facility: CLINIC | Age: 52
End: 2022-06-03

## 2022-06-03 ENCOUNTER — ANESTHESIA (OUTPATIENT)
Dept: PERIOP | Facility: HOSPITAL | Age: 52
End: 2022-06-03

## 2022-06-03 ENCOUNTER — ANESTHESIA EVENT (OUTPATIENT)
Dept: PERIOP | Facility: HOSPITAL | Age: 52
End: 2022-06-03

## 2022-06-03 ENCOUNTER — HOSPITAL ENCOUNTER (OUTPATIENT)
Dept: PERIOP | Facility: HOSPITAL | Age: 52
Setting detail: OUTPATIENT SURGERY
Discharge: HOME | End: 2022-06-03
Attending: SURGERY | Admitting: SURGERY
Payer: COMMERCIAL

## 2022-06-03 VITALS
OXYGEN SATURATION: 94 % | TEMPERATURE: 95.1 F | DIASTOLIC BLOOD PRESSURE: 72 MMHG | SYSTOLIC BLOOD PRESSURE: 116 MMHG | HEIGHT: 66 IN | RESPIRATION RATE: 18 BRPM | WEIGHT: 293 LBS | BODY MASS INDEX: 47.09 KG/M2 | HEART RATE: 72 BPM

## 2022-06-03 DIAGNOSIS — E66.01 MORBID (SEVERE) OBESITY DUE TO EXCESS CALORIES (HCC): ICD-10-CM

## 2022-06-03 PROCEDURE — 88342 IMHCHEM/IMCYTCHM 1ST ANTB: CPT | Performed by: PATHOLOGY

## 2022-06-03 PROCEDURE — 88341 IMHCHEM/IMCYTCHM EA ADD ANTB: CPT | Performed by: PATHOLOGY

## 2022-06-03 PROCEDURE — 43239 EGD BIOPSY SINGLE/MULTIPLE: CPT | Performed by: SURGERY

## 2022-06-03 PROCEDURE — 88305 TISSUE EXAM BY PATHOLOGIST: CPT | Performed by: PATHOLOGY

## 2022-06-03 RX ORDER — PROPOFOL 10 MG/ML
INJECTION, EMULSION INTRAVENOUS AS NEEDED
Status: DISCONTINUED | OUTPATIENT
Start: 2022-06-03 | End: 2022-06-03

## 2022-06-03 RX ORDER — LIDOCAINE HYDROCHLORIDE 10 MG/ML
INJECTION, SOLUTION EPIDURAL; INFILTRATION; INTRACAUDAL; PERINEURAL AS NEEDED
Status: DISCONTINUED | OUTPATIENT
Start: 2022-06-03 | End: 2022-06-03

## 2022-06-03 RX ORDER — SODIUM CHLORIDE, SODIUM LACTATE, POTASSIUM CHLORIDE, CALCIUM CHLORIDE 600; 310; 30; 20 MG/100ML; MG/100ML; MG/100ML; MG/100ML
INJECTION, SOLUTION INTRAVENOUS CONTINUOUS PRN
Status: DISCONTINUED | OUTPATIENT
Start: 2022-06-03 | End: 2022-06-03

## 2022-06-03 RX ADMIN — LIDOCAINE HYDROCHLORIDE 50 MG: 10 INJECTION, SOLUTION EPIDURAL; INFILTRATION; INTRACAUDAL at 07:33

## 2022-06-03 RX ADMIN — PROPOFOL 50 MG: 10 INJECTION, EMULSION INTRAVENOUS at 07:34

## 2022-06-03 RX ADMIN — SODIUM CHLORIDE, SODIUM LACTATE, POTASSIUM CHLORIDE, AND CALCIUM CHLORIDE: .6; .31; .03; .02 INJECTION, SOLUTION INTRAVENOUS at 07:29

## 2022-06-03 RX ADMIN — PROPOFOL 150 MG: 10 INJECTION, EMULSION INTRAVENOUS at 07:33

## 2022-06-03 RX ADMIN — PROPOFOL 50 MG: 10 INJECTION, EMULSION INTRAVENOUS at 07:35

## 2022-06-03 NOTE — TELEPHONE ENCOUNTER
Pt needs a repeat of a EGD to be scheduled  Left a vcmail to give the office a call back to schedule

## 2022-06-03 NOTE — ANESTHESIA POSTPROCEDURE EVALUATION
Post-Op Assessment Note    CV Status:  Stable  Pain Score: 0    Pain management: adequate     Mental Status:  Sleepy   Hydration Status:  Stable   PONV Controlled:  None   Airway Patency:  Patent   Two or more mitigation strategies used for obstructive sleep apnea   Post Op Vitals Reviewed: Yes      Staff: CRNA         No complications documented      BP   143/79   Temp     Pulse 75   Resp 16   SpO2 99   //

## 2022-06-03 NOTE — H&P
This is a 45 y o  female with a history of morbid obesity and Body mass index is 50 71 kg/m²  Here for an EGD to evaluate the anatomy of the GI tract and to rule out the presence of H  pylori  Physical Exam    /85   Pulse 85   Temp (!) 95 1 °F (35 1 °C) (Temporal)   Resp 16   Ht 5' 6" (1 676 m)   Wt (!) 143 kg (314 lb 3 2 oz)   SpO2 95%   BMI 50 71 kg/m²    AAOx3  RRR  CTA B  Abdomen obese  Benign  A/P:    This is a 45 y o  female with a history of morbid obesity and Body mass index is 50 71 kg/m²       Will proceed with the EGD and biopsies        Osmani Davenport MD  6/3/2022  7:21 AM

## 2022-06-03 NOTE — ANESTHESIA PREPROCEDURE EVALUATION
Procedure:  EGD    Relevant Problems   CARDIO   (+) Essential hypertension   (+) High cholesterol   (+) Mixed hyperlipidemia        Physical Exam    Airway    Mallampati score: II  TM Distance: >3 FB  Neck ROM: full     Dental   No notable dental hx     Cardiovascular  Cardiovascular exam normal    Pulmonary  Pulmonary exam normal     Other Findings        Anesthesia Plan  ASA Score- 2     Anesthesia Type- IV sedation with anesthesia with ASA Monitors  Additional Monitors:   Airway Plan:           Plan Factors-Exercise tolerance (METS): >4 METS  Chart reviewed  Imaging results reviewed  Existing labs reviewed  Patient summary reviewed  Patient is not a current smoker  Obstructive sleep apnea risk education given perioperatively  Induction-     Postoperative Plan-     Informed Consent- Anesthetic plan and risks discussed with patient  I personally reviewed this patient with the CRNA  Discussed and agreed on the Anesthesia Plan with the CRNA  Jose Peterson

## 2022-06-06 NOTE — TELEPHONE ENCOUNTER
Called pt and left voicemail  Again advised pt to call back to schedule repeat EGD  Advised pt of open dates of June 17th or Aug 19th  Also advised pt to consume liquids only for 24hr prior to repeat EGD  Advised can do water and other beverages to include protein shakes, but not food  Requested pt call the  to schedule ([phone]) and can call RD ([phone]) if has any questions about the diet 24hr prior to

## 2022-06-07 ENCOUNTER — PREP FOR PROCEDURE (OUTPATIENT)
Dept: BARIATRICS | Facility: CLINIC | Age: 52
End: 2022-06-07

## 2022-06-07 ENCOUNTER — HOSPITAL ENCOUNTER (OUTPATIENT)
Dept: RADIOLOGY | Facility: IMAGING CENTER | Age: 52
Discharge: HOME | End: 2022-06-07
Payer: COMMERCIAL

## 2022-06-07 DIAGNOSIS — M25.561 ACUTE PAIN OF RIGHT KNEE: ICD-10-CM

## 2022-06-07 DIAGNOSIS — E66.01 MORBID (SEVERE) OBESITY DUE TO EXCESS CALORIES (HCC): Primary | ICD-10-CM

## 2022-06-07 DIAGNOSIS — M23.91 INTERNAL DERANGEMENT OF RIGHT KNEE: ICD-10-CM

## 2022-06-07 PROCEDURE — 73721 MRI JNT OF LWR EXTRE W/O DYE: CPT

## 2022-06-07 PROCEDURE — G1004 CDSM NDSC: HCPCS

## 2022-06-08 ENCOUNTER — TELEPHONE (OUTPATIENT)
Dept: PAIN MEDICINE | Facility: CLINIC | Age: 52
End: 2022-06-08

## 2022-06-08 NOTE — TELEPHONE ENCOUNTER
Mri right knee   ----- Message from Ketan Calabrese DO sent at 6/8/2022  4:15 PM EDT -----  Please notify the patient that she has a medial meniscus tear and assist her in obtaining a follow up appointment with Dr Kuldeep Bazan for definitive care

## 2022-06-17 ENCOUNTER — ANESTHESIA (OUTPATIENT)
Dept: PERIOP | Facility: HOSPITAL | Age: 52
End: 2022-06-17

## 2022-06-17 ENCOUNTER — HOSPITAL ENCOUNTER (OUTPATIENT)
Dept: PERIOP | Facility: HOSPITAL | Age: 52
Setting detail: OUTPATIENT SURGERY
Discharge: HOME | End: 2022-06-17
Attending: SURGERY
Payer: COMMERCIAL

## 2022-06-17 ENCOUNTER — ANESTHESIA EVENT (OUTPATIENT)
Dept: PERIOP | Facility: HOSPITAL | Age: 52
End: 2022-06-17

## 2022-06-17 VITALS
TEMPERATURE: 96.8 F | HEART RATE: 77 BPM | DIASTOLIC BLOOD PRESSURE: 64 MMHG | OXYGEN SATURATION: 94 % | WEIGHT: 293 LBS | RESPIRATION RATE: 20 BRPM | BODY MASS INDEX: 47.09 KG/M2 | HEIGHT: 66 IN | SYSTOLIC BLOOD PRESSURE: 126 MMHG

## 2022-06-17 DIAGNOSIS — E66.01 MORBID (SEVERE) OBESITY DUE TO EXCESS CALORIES (HCC): ICD-10-CM

## 2022-06-17 PROCEDURE — 43235 EGD DIAGNOSTIC BRUSH WASH: CPT | Performed by: SURGERY

## 2022-06-17 RX ORDER — SODIUM CHLORIDE, SODIUM LACTATE, POTASSIUM CHLORIDE, CALCIUM CHLORIDE 600; 310; 30; 20 MG/100ML; MG/100ML; MG/100ML; MG/100ML
INJECTION, SOLUTION INTRAVENOUS CONTINUOUS PRN
Status: DISCONTINUED | OUTPATIENT
Start: 2022-06-17 | End: 2022-06-17

## 2022-06-17 RX ORDER — PROPOFOL 10 MG/ML
INJECTION, EMULSION INTRAVENOUS AS NEEDED
Status: DISCONTINUED | OUTPATIENT
Start: 2022-06-17 | End: 2022-06-17

## 2022-06-17 RX ORDER — LIDOCAINE HYDROCHLORIDE 10 MG/ML
INJECTION, SOLUTION EPIDURAL; INFILTRATION; INTRACAUDAL; PERINEURAL AS NEEDED
Status: DISCONTINUED | OUTPATIENT
Start: 2022-06-17 | End: 2022-06-17

## 2022-06-17 RX ORDER — SODIUM CHLORIDE, SODIUM LACTATE, POTASSIUM CHLORIDE, CALCIUM CHLORIDE 600; 310; 30; 20 MG/100ML; MG/100ML; MG/100ML; MG/100ML
125 INJECTION, SOLUTION INTRAVENOUS CONTINUOUS
Status: CANCELLED | OUTPATIENT
Start: 2022-06-17

## 2022-06-17 RX ORDER — LIDOCAINE HYDROCHLORIDE 10 MG/ML
0.5 INJECTION, SOLUTION EPIDURAL; INFILTRATION; INTRACAUDAL; PERINEURAL ONCE AS NEEDED
Status: CANCELLED | OUTPATIENT
Start: 2022-06-17

## 2022-06-17 RX ADMIN — SODIUM CHLORIDE, SODIUM LACTATE, POTASSIUM CHLORIDE, AND CALCIUM CHLORIDE: .6; .31; .03; .02 INJECTION, SOLUTION INTRAVENOUS at 07:26

## 2022-06-17 RX ADMIN — PROPOFOL 100 MG: 10 INJECTION, EMULSION INTRAVENOUS at 07:30

## 2022-06-17 RX ADMIN — LIDOCAINE HYDROCHLORIDE 50 MG: 10 INJECTION, SOLUTION EPIDURAL; INFILTRATION; INTRACAUDAL; PERINEURAL at 07:30

## 2022-06-17 RX ADMIN — PROPOFOL 50 MG: 10 INJECTION, EMULSION INTRAVENOUS at 07:32

## 2022-06-17 NOTE — ANESTHESIA PREPROCEDURE EVALUATION
Procedure:  EGD    Relevant Problems   ANESTHESIA (within normal limits)      CARDIO   (+) Essential hypertension   (+) High cholesterol   (+) Mixed hyperlipidemia      ENDO (within normal limits)      PULMONARY (within normal limits)      Other   (+) Obesity, Class III, BMI 40-49 9 (morbid obesity) (HCC)        Physical Exam    Airway    Mallampati score: III  TM Distance: >3 FB  Neck ROM: full     Dental   No notable dental hx     Cardiovascular      Pulmonary      Other Findings        Anesthesia Plan  ASA Score- 3     Anesthesia Type- IV sedation with anesthesia with ASA Monitors  Additional Monitors:   Airway Plan:           Plan Factors-Exercise tolerance (METS): >4 METS  Chart reviewed  EKG reviewed  Existing labs reviewed  Patient summary reviewed  Patient is not a current smoker  Induction-     Postoperative Plan-     Informed Consent- Anesthetic plan and risks discussed with patient  I personally reviewed this patient with the CRNA  Discussed and agreed on the Anesthesia Plan with the CRNA  Lc John

## 2022-06-17 NOTE — H&P
This is a 45 y o  female with a history of morbid obesity and Body mass index is 49 68 kg/m²  Here for an EGD to evaluate the anatomy of the GI tract and to rule out the presence of H  pylori  Physical Exam    /86   Pulse 71   Temp (!) 96 8 °F (36 °C) (Temporal)   Resp 16   Ht 5' 6" (1 676 m)   Wt (!) 140 kg (307 lb 12 8 oz)   SpO2 97%   BMI 49 68 kg/m²    AAOx3  RRR  CTA B  Abdomen obese  Benign  A/P:    This is a 45 y o  female with a history of morbid obesity and Body mass index is 49 68 kg/m²       Will proceed with the EGD and biopsies        Amarjit Claudio MD  6/17/2022  7:13 AM

## 2022-06-21 ENCOUNTER — OFFICE VISIT (OUTPATIENT)
Dept: OBGYN CLINIC | Facility: CLINIC | Age: 52
End: 2022-06-21
Payer: COMMERCIAL

## 2022-06-21 VITALS
HEART RATE: 70 BPM | DIASTOLIC BLOOD PRESSURE: 80 MMHG | BODY MASS INDEX: 47.09 KG/M2 | WEIGHT: 293 LBS | HEIGHT: 66 IN | SYSTOLIC BLOOD PRESSURE: 128 MMHG

## 2022-06-21 DIAGNOSIS — Z01.812 ENCOUNTER FOR PRE-OPERATIVE LABORATORY TESTING: ICD-10-CM

## 2022-06-21 DIAGNOSIS — S83.241A OTHER TEAR OF MEDIAL MENISCUS, CURRENT INJURY, RIGHT KNEE, INITIAL ENCOUNTER: Primary | ICD-10-CM

## 2022-06-21 PROCEDURE — 3074F SYST BP LT 130 MM HG: CPT | Performed by: ORTHOPAEDIC SURGERY

## 2022-06-21 PROCEDURE — 3079F DIAST BP 80-89 MM HG: CPT | Performed by: ORTHOPAEDIC SURGERY

## 2022-06-21 PROCEDURE — 99214 OFFICE O/P EST MOD 30 MIN: CPT | Performed by: ORTHOPAEDIC SURGERY

## 2022-06-21 NOTE — PROGRESS NOTES
Assessment/Plan:  1  Other tear of medial meniscus, current injury, right knee, initial encounter  Case request operating room: KNEE ARTHROSCOPY  MEDIAL MENISCECTOMY    Ambulatory referral to [facility]    Basic metabolic panel    CBC and differential    APTT    Protime-INR    HEMOGLOBIN A1C W/ EAG ESTIMATION    EKG 12 lead    Case request operating room: KNEE ARTHROSCOPY  MEDIAL MENISCECTOMY   2  Encounter for pre-operative laboratory testing  Ambulatory referral to [facility]    Basic metabolic panel    CBC and differential    APTT    Protime-INR    HEMOGLOBIN A1C W/ EAG ESTIMATION    EKG 12 lead       Scribe Attestation    I,:  Shell Reed am acting as a scribe while in the presence of the attending physician :       I,:  Dewayne Epstein MD personally performed the services described in this documentation    as scribed in my presence :         Carter Rodríguez upon examination and review of the MRI of the right knee does demonstrate a large medial meniscus posterior horn root tear  Her clinical exam is consistent with these findings with point tenderness at the posterior medial joint line as well as a positive Thessaly's maneuver and José's  I did have a lengthy discussion with Carter Rodríguez in regards to further delineation of her care  I did discuss non operative care in the form of physical therapy, bracing and intra-articular steroid injections  I did also remark that she could consider surgical intervention in the form of a right knee arthroscopy with medial meniscectomy  I did discuss the procedure with her at length as well as the risks including but not limited to bleeding, blood clots, infection, nerve injury resulting weakness, numbness, pain, stiffness, worsening of symptoms, failure surgery, recurrent injury, and need for further surgery  Ninibatsheva Anne verbalized understanding and was amenable to the treatment plan presented to her today    Carter Rodríguez provided signed consent for a right knee arthroscopy with medial meniscectomy  Palak Abdullahi will be required to have preoperative labs and EKG completed in addition to a medical clearance from her primary care physician  She will utilize a walker postoperatively in lieu of crutches as she is more stable with the walker  Palak Abdullahi will be contacted by my surgical scheduler set up a date and time have a surgical plate  I will see her back on the date of her surgery  Subjective:   Jenna Prado is a 45 y o  female who presents to the office today for initial evaluation of her right knee  She is referred to me by Dr Bob Mcneil  She suffered an injury to her knee approximately 6 weeks ago  She experienced a painful pop to the medial aspect of her knee while putting shoes on  She notes that she was having progressively worsening painful symptoms into the knee as well as into the hip and foot  She was provided an oral steroid from the emergency department and notes that this did provide her with some symptomatic relief  Initially she was experiencing pain laterally when she was evaluated by Dr Marietta Page  They did have concern for a lateral meniscus tear  A subsequent MRI was ordered  She did bring this with her to be reviewed today  She does localize pain to the medial aspect of her knee today that can radiate into the lower leg  She notes that her range of motion overall is intact  However has significant pain medially with deep squats  She does experience painful clicking and popping  However, denies any gross instability of her knee  She states that she does have expectations in the near future should undergo weight loss surgery  However, has concern that the knee pain may inhibit her ability to successfully lose weight postoperatively  Today she denies any distal paresthesias  Review of Systems   Constitutional: Negative for chills, fever and unexpected weight change     HENT: Negative for hearing loss, nosebleeds and sore throat  Eyes: Negative for pain, redness and visual disturbance  Respiratory: Negative for cough, shortness of breath and wheezing  Cardiovascular: Negative for chest pain, palpitations and leg swelling  Gastrointestinal: Negative for abdominal pain, nausea and vomiting  Endocrine: Negative for polydipsia and polyuria  Genitourinary: Negative for dysuria and hematuria  Musculoskeletal: Positive for arthralgias and myalgias  See HPI   Skin: Negative for rash and wound  Neurological: Negative for dizziness, numbness and headaches  Psychiatric/Behavioral: Negative for decreased concentration and suicidal ideas  The patient is not nervous/anxious            Past Medical History:   Diagnosis Date    Asthma     Broken tooth     upper right-back    High cholesterol     Hypertension     Morbid obesity with BMI of 45 0-49 9, adult (HCC)     Pain in right knee     MRI on 22       Past Surgical History:   Procedure Laterality Date     SECTION      EGD  2022    food in the stomach-repeat EGD on 22    LAPAROSCOPY  1986    TONSILLECTOMY      WISDOM TOOTH EXTRACTION         Family History   Problem Relation Age of Onset    Hypertension Mother     Stroke Mother     Diabetes Father     Hypertension Father     Heart disease Father     Thyroid disease Neg Hx        Social History     Occupational History    Not on file   Tobacco Use    Smoking status: Never Smoker    Smokeless tobacco: Never Used   Vaping Use    Vaping Use: Never used   Substance and Sexual Activity    Alcohol use: Yes     Comment: rare    Drug use: Never    Sexual activity: Not on file         Current Outpatient Medications:     amLODIPine (NORVASC) 5 mg tablet, Take 1 tablet (5 mg total) by mouth daily (Patient taking differently: Take 5 mg by mouth every morning), Disp: 90 tablet, Rfl: 1    ibuprofen (MOTRIN) 200 mg tablet, Take 600 mg by mouth every 6 (six) hours as needed for mild pain, Disp: , Rfl:     lisinopril (ZESTRIL) 10 mg tablet, Take 1 tablet (10 mg total) by mouth daily (Patient taking differently: Take 10 mg by mouth every morning), Disp: 90 tablet, Rfl: 1    metoprolol succinate (TOPROL-XL) 50 mg 24 hr tablet, Take 1 tablet (50 mg total) by mouth daily (Patient taking differently: Take 50 mg by mouth every morning), Disp: 90 tablet, Rfl: 1    rosuvastatin (CRESTOR) 20 MG tablet, Take 1 tablet (20 mg total) by mouth daily (Patient taking differently: Take 20 mg by mouth every morning), Disp: 90 tablet, Rfl: 1    vitamin E, tocopherol, 400 units capsule, Take 400 Units by mouth in the morning , Disp: , Rfl:     No Known Allergies    Objective:  Vitals:    06/21/22 0856   BP: 128/80   Pulse: 70       Right Knee Exam     Tenderness   The patient is experiencing tenderness in the medial joint line  Range of Motion   Extension: 0   Flexion: 110     Tests   José:  Medial - positive Lateral - negative  Varus: negative Valgus: negative  Lachman:  Anterior - negative    Posterior - negative  Drawer:  Anterior - negative    Posterior - negative    Other   Erythema: absent  Scars: absent  Sensation: normal  Pulse: present  Effusion: no effusion present    Comments: Thessaly's: positive          Observations     Right Knee   Negative for effusion  Physical Exam  Vitals reviewed  HENT:      Head: Normocephalic and atraumatic  Eyes:      General:         Right eye: No discharge  Left eye: No discharge  Conjunctiva/sclera: Conjunctivae normal       Pupils: Pupils are equal, round, and reactive to light  Cardiovascular:      Rate and Rhythm: Normal rate  Heart sounds: Normal heart sounds  Pulmonary:      Effort: Pulmonary effort is normal  No respiratory distress  Breath sounds: Normal breath sounds  Musculoskeletal:      Cervical back: Normal range of motion and neck supple  Right knee: No effusion        Instability Tests: Medial José test positive  Lateral José test negative  Comments: As noted in HPI   Skin:     General: Skin is warm and dry  Neurological:      Mental Status: She is alert and oriented to person, place, and time           I have personally reviewed pertinent films in PACS and my interpretation is as follows:    MRI of the right knee demonstrates a large medial meniscus posterior horn root tear

## 2022-06-21 NOTE — H&P (VIEW-ONLY)
Assessment/Plan:  1  Other tear of medial meniscus, current injury, right knee, initial encounter  Case request operating room: KNEE ARTHROSCOPY  MEDIAL MENISCECTOMY    Ambulatory referral to [facility]    Basic metabolic panel    CBC and differential    APTT    Protime-INR    HEMOGLOBIN A1C W/ EAG ESTIMATION    EKG 12 lead    Case request operating room: KNEE ARTHROSCOPY  MEDIAL MENISCECTOMY   2  Encounter for pre-operative laboratory testing  Ambulatory referral to [facility]    Basic metabolic panel    CBC and differential    APTT    Protime-INR    HEMOGLOBIN A1C W/ EAG ESTIMATION    EKG 12 lead       Scribe Attestation    I,:  Usman Fox am acting as a scribe while in the presence of the attending physician :       I,:  Natan Easton MD personally performed the services described in this documentation    as scribed in my presence :         Tammy Barnett upon examination and review of the MRI of the right knee does demonstrate a large medial meniscus posterior horn root tear  Her clinical exam is consistent with these findings with point tenderness at the posterior medial joint line as well as a positive Thessaly's maneuver and José's  I did have a lengthy discussion with Hedyarnold Barnett in regards to further delineation of her care  I did discuss non operative care in the form of physical therapy, bracing and intra-articular steroid injections  I did also remark that she could consider surgical intervention in the form of a right knee arthroscopy with medial meniscectomy  I did discuss the procedure with her at length as well as the risks including but not limited to bleeding, blood clots, infection, nerve injury resulting weakness, numbness, pain, stiffness, worsening of symptoms, failure surgery, recurrent injury, and need for further surgery  Tammy Barnett verbalized understanding and was amenable to the treatment plan presented to her today    Tammy Barnett provided signed consent for a right knee arthroscopy with medial meniscectomy  Sharmin Reid will be required to have preoperative labs and EKG completed in addition to a medical clearance from her primary care physician  She will utilize a walker postoperatively in lieu of crutches as she is more stable with the walker  Sharmin Reid will be contacted by my surgical scheduler set up a date and time have a surgical plate  I will see her back on the date of her surgery  Subjective:   Vaughn Holt is a 45 y o  female who presents to the office today for initial evaluation of her right knee  She is referred to me by Dr Sherin Alvarez  She suffered an injury to her knee approximately 6 weeks ago  She experienced a painful pop to the medial aspect of her knee while putting shoes on  She notes that she was having progressively worsening painful symptoms into the knee as well as into the hip and foot  She was provided an oral steroid from the emergency department and notes that this did provide her with some symptomatic relief  Initially she was experiencing pain laterally when she was evaluated by Dr Olvin Ceron  They did have concern for a lateral meniscus tear  A subsequent MRI was ordered  She did bring this with her to be reviewed today  She does localize pain to the medial aspect of her knee today that can radiate into the lower leg  She notes that her range of motion overall is intact  However has significant pain medially with deep squats  She does experience painful clicking and popping  However, denies any gross instability of her knee  She states that she does have expectations in the near future should undergo weight loss surgery  However, has concern that the knee pain may inhibit her ability to successfully lose weight postoperatively  Today she denies any distal paresthesias  Review of Systems   Constitutional: Negative for chills, fever and unexpected weight change     HENT: Negative for hearing loss, nosebleeds and sore throat  Eyes: Negative for pain, redness and visual disturbance  Respiratory: Negative for cough, shortness of breath and wheezing  Cardiovascular: Negative for chest pain, palpitations and leg swelling  Gastrointestinal: Negative for abdominal pain, nausea and vomiting  Endocrine: Negative for polydipsia and polyuria  Genitourinary: Negative for dysuria and hematuria  Musculoskeletal: Positive for arthralgias and myalgias  See HPI   Skin: Negative for rash and wound  Neurological: Negative for dizziness, numbness and headaches  Psychiatric/Behavioral: Negative for decreased concentration and suicidal ideas  The patient is not nervous/anxious            Past Medical History:   Diagnosis Date    Asthma     Broken tooth     upper right-back    High cholesterol     Hypertension     Morbid obesity with BMI of 45 0-49 9, adult (HCC)     Pain in right knee     MRI on 22       Past Surgical History:   Procedure Laterality Date     SECTION      EGD  2022    food in the stomach-repeat EGD on 22    LAPAROSCOPY  1986    TONSILLECTOMY      WISDOM TOOTH EXTRACTION         Family History   Problem Relation Age of Onset    Hypertension Mother     Stroke Mother     Diabetes Father     Hypertension Father     Heart disease Father     Thyroid disease Neg Hx        Social History     Occupational History    Not on file   Tobacco Use    Smoking status: Never Smoker    Smokeless tobacco: Never Used   Vaping Use    Vaping Use: Never used   Substance and Sexual Activity    Alcohol use: Yes     Comment: rare    Drug use: Never    Sexual activity: Not on file         Current Outpatient Medications:     amLODIPine (NORVASC) 5 mg tablet, Take 1 tablet (5 mg total) by mouth daily (Patient taking differently: Take 5 mg by mouth every morning), Disp: 90 tablet, Rfl: 1    ibuprofen (MOTRIN) 200 mg tablet, Take 600 mg by mouth every 6 (six) hours as needed for mild pain, Disp: , Rfl:     lisinopril (ZESTRIL) 10 mg tablet, Take 1 tablet (10 mg total) by mouth daily (Patient taking differently: Take 10 mg by mouth every morning), Disp: 90 tablet, Rfl: 1    metoprolol succinate (TOPROL-XL) 50 mg 24 hr tablet, Take 1 tablet (50 mg total) by mouth daily (Patient taking differently: Take 50 mg by mouth every morning), Disp: 90 tablet, Rfl: 1    rosuvastatin (CRESTOR) 20 MG tablet, Take 1 tablet (20 mg total) by mouth daily (Patient taking differently: Take 20 mg by mouth every morning), Disp: 90 tablet, Rfl: 1    vitamin E, tocopherol, 400 units capsule, Take 400 Units by mouth in the morning , Disp: , Rfl:     No Known Allergies    Objective:  Vitals:    06/21/22 0856   BP: 128/80   Pulse: 70       Right Knee Exam     Tenderness   The patient is experiencing tenderness in the medial joint line  Range of Motion   Extension: 0   Flexion: 110     Tests   José:  Medial - positive Lateral - negative  Varus: negative Valgus: negative  Lachman:  Anterior - negative    Posterior - negative  Drawer:  Anterior - negative    Posterior - negative    Other   Erythema: absent  Scars: absent  Sensation: normal  Pulse: present  Effusion: no effusion present    Comments: Thessaly's: positive          Observations     Right Knee   Negative for effusion  Physical Exam  Vitals reviewed  HENT:      Head: Normocephalic and atraumatic  Eyes:      General:         Right eye: No discharge  Left eye: No discharge  Conjunctiva/sclera: Conjunctivae normal       Pupils: Pupils are equal, round, and reactive to light  Cardiovascular:      Rate and Rhythm: Normal rate  Heart sounds: Normal heart sounds  Pulmonary:      Effort: Pulmonary effort is normal  No respiratory distress  Breath sounds: Normal breath sounds  Musculoskeletal:      Cervical back: Normal range of motion and neck supple  Right knee: No effusion        Instability Tests: Medial José test positive  Lateral José test negative  Comments: As noted in HPI   Skin:     General: Skin is warm and dry  Neurological:      Mental Status: She is alert and oriented to person, place, and time           I have personally reviewed pertinent films in PACS and my interpretation is as follows:    MRI of the right knee demonstrates a large medial meniscus posterior horn root tear

## 2022-06-23 DIAGNOSIS — S83.241A OTHER TEAR OF MEDIAL MENISCUS, CURRENT INJURY, RIGHT KNEE, INITIAL ENCOUNTER: Primary | ICD-10-CM

## 2022-06-27 ENCOUNTER — TELEPHONE (OUTPATIENT)
Dept: OBGYN CLINIC | Facility: CLINIC | Age: 52
End: 2022-06-27

## 2022-06-27 ENCOUNTER — CLINICAL SUPPORT (OUTPATIENT)
Dept: URGENT CARE | Facility: CLINIC | Age: 52
End: 2022-06-27
Payer: COMMERCIAL

## 2022-06-27 ENCOUNTER — LAB (OUTPATIENT)
Dept: LAB | Facility: CLINIC | Age: 52
End: 2022-06-27
Payer: COMMERCIAL

## 2022-06-27 DIAGNOSIS — Z01.818 PRE-OP TESTING: ICD-10-CM

## 2022-06-27 DIAGNOSIS — E78.2 MIXED HYPERLIPIDEMIA: ICD-10-CM

## 2022-06-27 DIAGNOSIS — S83.241A OTHER TEAR OF MEDIAL MENISCUS, CURRENT INJURY, RIGHT KNEE, INITIAL ENCOUNTER: ICD-10-CM

## 2022-06-27 DIAGNOSIS — I10 ESSENTIAL HYPERTENSION: ICD-10-CM

## 2022-06-27 DIAGNOSIS — R73.09 ABNORMAL GLUCOSE: ICD-10-CM

## 2022-06-27 DIAGNOSIS — Z01.812 ENCOUNTER FOR PRE-OPERATIVE LABORATORY TESTING: ICD-10-CM

## 2022-06-27 DIAGNOSIS — E66.01 OBESITY, CLASS III, BMI 40-49.9 (MORBID OBESITY) (HCC): ICD-10-CM

## 2022-06-27 LAB
ALBUMIN SERPL BCP-MCNC: 3.8 G/DL (ref 3.5–5)
ALP SERPL-CCNC: 69 U/L (ref 46–116)
ALT SERPL W P-5'-P-CCNC: 48 U/L (ref 12–78)
ANION GAP SERPL CALCULATED.3IONS-SCNC: 6 MMOL/L (ref 4–13)
APTT PPP: 26 SECONDS (ref 23–37)
AST SERPL W P-5'-P-CCNC: 34 U/L (ref 5–45)
ATRIAL RATE: 61 BPM
BASOPHILS # BLD AUTO: 0.06 THOUSANDS/ΜL (ref 0–0.1)
BASOPHILS NFR BLD AUTO: 1 % (ref 0–1)
BILIRUB SERPL-MCNC: 0.38 MG/DL (ref 0.2–1)
BUN SERPL-MCNC: 9 MG/DL (ref 5–25)
CALCIUM SERPL-MCNC: 9 MG/DL (ref 8.3–10.1)
CHLORIDE SERPL-SCNC: 105 MMOL/L (ref 100–108)
CHOLEST SERPL-MCNC: 209 MG/DL
CO2 SERPL-SCNC: 27 MMOL/L (ref 21–32)
CREAT SERPL-MCNC: 0.56 MG/DL (ref 0.6–1.3)
EOSINOPHIL # BLD AUTO: 0.15 THOUSAND/ΜL (ref 0–0.61)
EOSINOPHIL NFR BLD AUTO: 2 % (ref 0–6)
ERYTHROCYTE [DISTWIDTH] IN BLOOD BY AUTOMATED COUNT: 13.3 % (ref 11.6–15.1)
EST. AVERAGE GLUCOSE BLD GHB EST-MCNC: 183 MG/DL
GFR SERPL CREATININE-BSD FRML MDRD: 107 ML/MIN/1.73SQ M
GLUCOSE P FAST SERPL-MCNC: 174 MG/DL (ref 65–99)
HBA1C MFR BLD: 8 %
HCT VFR BLD AUTO: 43.4 % (ref 34.8–46.1)
HDLC SERPL-MCNC: 48 MG/DL
HGB BLD-MCNC: 14.7 G/DL (ref 11.5–15.4)
IMM GRANULOCYTES # BLD AUTO: 0.02 THOUSAND/UL (ref 0–0.2)
IMM GRANULOCYTES NFR BLD AUTO: 0 % (ref 0–2)
INR PPP: 1 (ref 0.84–1.19)
LDLC SERPL CALC-MCNC: 109 MG/DL (ref 0–100)
LYMPHOCYTES # BLD AUTO: 3.09 THOUSANDS/ΜL (ref 0.6–4.47)
LYMPHOCYTES NFR BLD AUTO: 43 % (ref 14–44)
MCH RBC QN AUTO: 29.1 PG (ref 26.8–34.3)
MCHC RBC AUTO-ENTMCNC: 33.9 G/DL (ref 31.4–37.4)
MCV RBC AUTO: 86 FL (ref 82–98)
MONOCYTES # BLD AUTO: 0.37 THOUSAND/ΜL (ref 0.17–1.22)
MONOCYTES NFR BLD AUTO: 5 % (ref 4–12)
NEUTROPHILS # BLD AUTO: 3.48 THOUSANDS/ΜL (ref 1.85–7.62)
NEUTS SEG NFR BLD AUTO: 49 % (ref 43–75)
NRBC BLD AUTO-RTO: 0 /100 WBCS
P AXIS: 73 DEGREES
PLATELET # BLD AUTO: 211 THOUSANDS/UL (ref 149–390)
PMV BLD AUTO: 12.5 FL (ref 8.9–12.7)
POTASSIUM SERPL-SCNC: 4 MMOL/L (ref 3.5–5.3)
PR INTERVAL: 170 MS
PROT SERPL-MCNC: 7.9 G/DL (ref 6.4–8.2)
PROTHROMBIN TIME: 12.8 SECONDS (ref 11.6–14.5)
QRS AXIS: 70 DEGREES
QRSD INTERVAL: 94 MS
QT INTERVAL: 414 MS
QTC INTERVAL: 416 MS
RBC # BLD AUTO: 5.06 MILLION/UL (ref 3.81–5.12)
SODIUM SERPL-SCNC: 138 MMOL/L (ref 136–145)
T WAVE AXIS: 58 DEGREES
TRIGL SERPL-MCNC: 258 MG/DL
TSH SERPL DL<=0.05 MIU/L-ACNC: 1.72 UIU/ML (ref 0.45–4.5)
VENTRICULAR RATE: 61 BPM
WBC # BLD AUTO: 7.17 THOUSAND/UL (ref 4.31–10.16)

## 2022-06-27 PROCEDURE — 3052F HG A1C>EQUAL 8.0%<EQUAL 9.0%: CPT | Performed by: FAMILY MEDICINE

## 2022-06-27 PROCEDURE — 83036 HEMOGLOBIN GLYCOSYLATED A1C: CPT

## 2022-06-27 PROCEDURE — 85610 PROTHROMBIN TIME: CPT

## 2022-06-27 PROCEDURE — 93010 ELECTROCARDIOGRAM REPORT: CPT

## 2022-06-27 PROCEDURE — 84443 ASSAY THYROID STIM HORMONE: CPT

## 2022-06-27 PROCEDURE — 85730 THROMBOPLASTIN TIME PARTIAL: CPT

## 2022-06-27 PROCEDURE — 85025 COMPLETE CBC W/AUTO DIFF WBC: CPT

## 2022-06-27 PROCEDURE — 80061 LIPID PANEL: CPT

## 2022-06-27 PROCEDURE — 80053 COMPREHEN METABOLIC PANEL: CPT

## 2022-06-27 PROCEDURE — 36415 COLL VENOUS BLD VENIPUNCTURE: CPT

## 2022-06-27 NOTE — TELEPHONE ENCOUNTER
Left v/m instructing Luke Nick to go for her pre op labs today as they are needed for her [facility] appointment scheduled for tomorrow

## 2022-06-28 ENCOUNTER — OFFICE VISIT (OUTPATIENT)
Dept: FAMILY MEDICINE CLINIC | Facility: CLINIC | Age: 52
End: 2022-06-28
Payer: COMMERCIAL

## 2022-06-28 ENCOUNTER — OFFICE VISIT (OUTPATIENT)
Dept: BARIATRICS | Facility: CLINIC | Age: 52
End: 2022-06-28

## 2022-06-28 VITALS
RESPIRATION RATE: 18 BRPM | HEIGHT: 66 IN | HEART RATE: 79 BPM | WEIGHT: 293 LBS | BODY MASS INDEX: 47.09 KG/M2 | OXYGEN SATURATION: 98 % | TEMPERATURE: 97.2 F | DIASTOLIC BLOOD PRESSURE: 80 MMHG | SYSTOLIC BLOOD PRESSURE: 124 MMHG

## 2022-06-28 VITALS — BODY MASS INDEX: 50.65 KG/M2 | WEIGHT: 293 LBS

## 2022-06-28 DIAGNOSIS — E66.01 MORBID (SEVERE) OBESITY DUE TO EXCESS CALORIES (HCC): Primary | ICD-10-CM

## 2022-06-28 DIAGNOSIS — G89.29 CHRONIC PAIN OF RIGHT KNEE: ICD-10-CM

## 2022-06-28 DIAGNOSIS — Z01.818 PRE-OP EXAMINATION: Primary | ICD-10-CM

## 2022-06-28 DIAGNOSIS — M25.561 CHRONIC PAIN OF RIGHT KNEE: ICD-10-CM

## 2022-06-28 DIAGNOSIS — E78.2 MIXED HYPERLIPIDEMIA: ICD-10-CM

## 2022-06-28 DIAGNOSIS — E11.9 NEW ONSET TYPE 2 DIABETES MELLITUS (HCC): ICD-10-CM

## 2022-06-28 DIAGNOSIS — I10 ESSENTIAL HYPERTENSION: ICD-10-CM

## 2022-06-28 PROBLEM — E78.00 HIGH CHOLESTEROL: Status: RESOLVED | Noted: 2022-05-13 | Resolved: 2022-06-28

## 2022-06-28 PROCEDURE — 1036F TOBACCO NON-USER: CPT | Performed by: FAMILY MEDICINE

## 2022-06-28 PROCEDURE — 3008F BODY MASS INDEX DOCD: CPT | Performed by: FAMILY MEDICINE

## 2022-06-28 PROCEDURE — RECHECK

## 2022-06-28 PROCEDURE — 92250 FUNDUS PHOTOGRAPHY W/I&R: CPT | Performed by: FAMILY MEDICINE

## 2022-06-28 PROCEDURE — 99214 OFFICE O/P EST MOD 30 MIN: CPT | Performed by: FAMILY MEDICINE

## 2022-06-28 RX ORDER — ROSUVASTATIN CALCIUM 40 MG/1
40 TABLET, COATED ORAL DAILY
Qty: 90 TABLET | Refills: 1 | Status: SHIPPED | OUTPATIENT
Start: 2022-06-28

## 2022-06-28 NOTE — PROGRESS NOTES
Patient presents for 3 of 3 weight check, current weight 313 8lbs  Eating behaviors/food choices: Patient reports increased eating due to stress and being more sedentary  She is preparing for knee surgery that has been a long time coming and she has been feeling frustrated  SW encouraged patient to focus on prepping and planning ahead especially for when she may be more limited during recovery  Suggested that she set up her environment for success by putting the tempting, higher calorie items out of reach and making the healthier options easier to access  Activity/Exercise:  Patient's mobility is limited due to knee pain  She is going to be starting PT a few days after her knee surgery so she will be active then  She is looking forward to being active once she's fully healed  Mental Health/Wellness:  Review of impact mood and stress can have on eating habits, encouraged to tune into mindset when reaching for food  Encouraged patient to reflect on non-food coping skills she can use now and in the future when feeling stressed or overwhelmed to avoid eating as a way to soothe  Workflow review:    Labs and PCP: both done, labs to be reviewed by RD  Psych and EGD: no eval needed, EGD done 6/3/2022  Nicotine: NA  Support Group: needs to be completed   Cardiology: scheduled for 7/29  Sleep: consult scheduled for 7/20  Weight and Weight Checks: 3 + 1 weight checks, goal weight 294lbs    Goals:    - be mindful of the impact mood and stress has one eating behaviors  - set up environment for success, put away higher calorie items and prep healthy options  - be active as tolerated with safe PT exercises      Next Appointment:  +1 visit with surgeon on 7/29

## 2022-06-28 NOTE — PROGRESS NOTES
FAMILY PRACTICE PRE-OPERATIVE EVALUATION  [facility]    NAME: Ajith Nina  AGE: 45 y o  SEX: female  : 1970     DATE: 2022    Family Practice Pre-Operative Evaluation      Chief Complaint: Pre-operative Evaluation     Surgery: rt knee surgery  Anticipated Date of Surgery: 22  Surgeon: Dr Ángel Brandt      History of Present Illness:     Pt is here for a preoperative appt  No forms to fill out  Had preop workup done already    Pt states she has been walking and felt her knee pop and states she went to the ed and then ortho - had an MRI and was dx with meniscal tear on the rt      Anesthesia:  general  Bleeding Risk: no recent abnormal bleeding, no remote history of abnormal bleeding and no use of Ca-channel blockers - Error - pt is on a Ca channel blocker  Current Anti-platelet/anticoagulation medication: pt on motrin - has stopped it yesterday    Assessment of Cardiac Risk:  · Denies unstable or severe angina or MI in the last 6 weeks or history of stent placement in the last year   · Denies decompensated heart failure (e g  New onset heart failure, NYHA functional class IV heart failure, or worsening existing heart failure)  · Denies significant arrhythmias such as high grade AV block, symptomatic ventricular arrhythmia, newly recognized ventricular tachycardia, supraventricular tachycardia with resting heart rate >100, or symptomatic bradycardia  · Denies severe heart valve disease including aortic stenosis or symptomatic mitral stenosis     Exercise Capacity:  · Able to walk 4 blocks without symptoms?: Yes  · Able to walk 2 flights without symptoms?: Yes    Prior Anesthesia Reactions: No     Personal history of venous thromboembolic disease? No    History of steroid use for >2 weeks within last year?  No         Review of Systems:     Review of Systems    Current Problem List:     Patient Active Problem List   Diagnosis    Obesity, Class III, BMI 40-49 9 (morbid obesity) ([facility] [address] )    Essential hypertension    Grief    Mixed hyperlipidemia    New onset type 2 diabetes mellitus (HCC)       Allergies:     No Known Allergies    Current Medications:       Current Outpatient Medications:     amLODIPine (NORVASC) 5 mg tablet, Take 1 tablet (5 mg total) by mouth daily (Patient taking differently: Take 5 mg by mouth every morning), Disp: 90 tablet, Rfl: 1    ibuprofen (MOTRIN) 200 mg tablet, Take 600 mg by mouth every 6 (six) hours as needed for mild pain, Disp: , Rfl:     lisinopril (ZESTRIL) 10 mg tablet, Take 1 tablet (10 mg total) by mouth daily (Patient taking differently: Take 10 mg by mouth every morning), Disp: 90 tablet, Rfl: 1    metFORMIN (GLUCOPHAGE) 500 mg tablet, Take 1 tablet (500 mg total) by mouth daily with breakfast, Disp: 90 tablet, Rfl: 1    metoprolol succinate (TOPROL-XL) 50 mg 24 hr tablet, Take 1 tablet (50 mg total) by mouth daily (Patient taking differently: Take 50 mg by mouth every morning), Disp: 90 tablet, Rfl: 1    rosuvastatin (CRESTOR) 40 MG tablet, Take 1 tablet (40 mg total) by mouth daily, Disp: 90 tablet, Rfl: 1    vitamin E, tocopherol, 400 units capsule, Take 400 Units by mouth in the morning , Disp: , Rfl:     Past Medical History:       Past Medical History:   Diagnosis Date    Asthma     Broken tooth     upper right-back    High cholesterol     Hypertension     Morbid obesity with BMI of 45 0-49 9, adult (HCC)     Pain in right knee     MRI on 22        Past Surgical History:   Procedure Laterality Date     SECTION      EGD  2022    food in the stomach-repeat EGD on 22    LAPAROSCOPY  1986    TONSILLECTOMY      WISDOM TOOTH EXTRACTION          Family History   Problem Relation Age of Onset    Hypertension Mother     Stroke Mother     Diabetes Father     Hypertension Father     Heart disease Father     Thyroid disease Neg Hx         Social History     Socioeconomic History    Marital status:      Spouse name: Not on file    Number of children: Not on file    Years of education: Not on file    Highest education level: Not on file   Occupational History    Not on file   Tobacco Use    Smoking status: Never Smoker    Smokeless tobacco: Never Used   Vaping Use    Vaping Use: Never used   Substance and Sexual Activity    Alcohol use: Yes     Comment: rare    Drug use: Never    Sexual activity: Not on file   Other Topics Concern    Not on file   Social History Narrative    Not on file     Social Determinants of Health     Financial Resource Strain: Not on file   Food Insecurity: Not on file   Transportation Needs: Not on file   Physical Activity: Not on file   Stress: Not on file   Social Connections: Not on file   Intimate Partner Violence: Not on file   Housing Stability: Not on file        Physical Exam:     /80   Pulse 79   Temp (!) 97 2 °F (36 2 °C)   Resp 18   Ht 5' 6" (1 676 m)   Wt (!) 143 kg (315 lb 9 6 oz)   SpO2 98%   BMI 50 94 kg/m²     Physical Exam  Cardiovascular:      Pulses: no weak pulses          Dorsalis pedis pulses are 2+ on the right side and 2+ on the left side  Posterior tibial pulses are 2+ on the right side and 2+ on the left side  Feet:      Right foot:      Skin integrity: No ulcer, skin breakdown, erythema, warmth, callus or dry skin  Left foot:      Skin integrity: No ulcer, skin breakdown, erythema, warmth, callus or dry skin  Data:     Pre-operative work-up    Laboratory Results: I have personally reviewed the pertinent laboratory results/reports  and pt is a new onste diabetic - started on Metformin  Also increased statin     EKG: I have personally reviewed pertinent reports     and cant pull up image - EKG was however read by cardio and is acceptable for surgery - NSR    Recent Results (from the past 672 hour(s))   Tissue Exam    Collection Time: 06/03/22  7:35 AM   Result Value Ref Range    Case Report Surgical Pathology Report                         Case: [id number]                                   Authorizing Provider:  Ely Duncan MD      Collected:           06/03/2022 0735              Ordering Location:     [address] [place] [address] Received:            06/03/2022 630                                     Operating Room                                                               Pathologist:           Tracey Watkins MD                                                    Specimen:    Stomach, Antrum - R/o H  Pylori                                                            Final Diagnosis       A  Stomach, Antrum:  - Chronic inactive antral gastritis with regenerative epithelial changes    - No Helicobacter pylori organisms are identified with immunoperoxidase stain   - Pan keratin stain highlights benign epithelial elements  Negative for intestinal metaplasia, dysplasia or carcinoma  Additional Information       All reported additional testing was performed with appropriately reactive controls  These tests were developed and their performance characteristics determined by [facility] or appropriate performing facility, though some tests may be performed on tissues which have not been validated for performance characteristics (such as staining performed on alcohol exposed cell blocks and decalcified tissues)  Results should be interpreted with caution and in the context of the patients clinical condition  These tests may not be cleared or approved by the [place]  Food and Drug Administration, though the FDA has determined that such clearance or approval is not necessary  These tests are used for clinical purposes and they should not be regarded as investigational or for research  This laboratory has been approved by CLIA 88, designated as a high-complexity laboratory and is qualified to perform these tests      Interpretation performed at [facility]  [place] [id number]      Gross Description          A  The specimen is received in formalin, labeled with the patient's name and hospital number, and is designated "stomach antrum biopsy-rule out H pylori"  The specimen consists of a single pink polypoid tissue fragment measuring 0 3 cm in greatest dimension  The specimen is entirely submitted in a screened cassette  Note: The estimated total formalin fixation time based upon information provided by the submitting clinician and the standard processing schedule is under 72 hours  Sorin Yoo              EKG 12 lead    Collection Time: 06/27/22 10:31 AM   Result Value Ref Range    Ventricular Rate 61 BPM    Atrial Rate 61 BPM    NE Interval 170 ms    QRSD Interval 94 ms    QT Interval 414 ms    QTC Interval 416 ms    P Axis 73 degrees    QRS Axis 70 degrees    T Wave Axis 58 degrees   Hemoglobin A1C    Collection Time: 06/27/22 10:57 AM   Result Value Ref Range    Hemoglobin A1C 8 0 (H) Normal 3 8-5 6%; PreDiabetic 5 7-6 4%;  Diabetic >=6 5%; Glycemic control for adults with diabetes <7 0% %     mg/dl   Comprehensive metabolic panel    Collection Time: 06/27/22 10:57 AM   Result Value Ref Range    Sodium 138 136 - 145 mmol/L    Potassium 4 0 3 5 - 5 3 mmol/L    Chloride 105 100 - 108 mmol/L    CO2 27 21 - 32 mmol/L    ANION GAP 6 4 - 13 mmol/L    BUN 9 5 - 25 mg/dL    Creatinine 0 56 (L) 0 60 - 1 30 mg/dL    Glucose, Fasting 174 (H) 65 - 99 mg/dL    Calcium 9 0 8 3 - 10 1 mg/dL    AST 34 5 - 45 U/L    ALT 48 12 - 78 U/L    Alkaline Phosphatase 69 46 - 116 U/L    Total Protein 7 9 6 4 - 8 2 g/dL    Albumin 3 8 3 5 - 5 0 g/dL    Total Bilirubin 0 38 0 20 - 1 00 mg/dL    eGFR 107 ml/min/1 73sq m   Lipid Panel with Direct LDL reflex    Collection Time: 06/27/22 10:57 AM   Result Value Ref Range    Cholesterol 209 (H) See Comment mg/dL    Triglycerides 258 (H) See Comment mg/dL    HDL, Direct 48 (L) >=50 mg/dL    LDL Calculated 109 (H) 0 - 100 mg/dL TSH, 3rd generation with Free T4 reflex    Collection Time: 06/27/22 10:57 AM   Result Value Ref Range    TSH 3RD GENERATON 1 720 0 450 - 4 500 uIU/mL   CBC and differential    Collection Time: 06/27/22 10:57 AM   Result Value Ref Range    WBC 7 17 4 31 - 10 16 Thousand/uL    RBC 5 06 3 81 - 5 12 Million/uL    Hemoglobin 14 7 11 5 - 15 4 g/dL    Hematocrit 43 4 34 8 - 46 1 %    MCV 86 82 - 98 fL    MCH 29 1 26 8 - 34 3 pg    MCHC 33 9 31 4 - 37 4 g/dL    RDW 13 3 11 6 - 15 1 %    MPV 12 5 8 9 - 12 7 fL    Platelets 972 779 - 679 Thousands/uL    nRBC 0 /100 WBCs    Neutrophils Relative 49 43 - 75 %    Immat GRANS % 0 0 - 2 %    Lymphocytes Relative 43 14 - 44 %    Monocytes Relative 5 4 - 12 %    Eosinophils Relative 2 0 - 6 %    Basophils Relative 1 0 - 1 %    Neutrophils Absolute 3 48 1 85 - 7 62 Thousands/µL    Immature Grans Absolute 0 02 0 00 - 0 20 Thousand/uL    Lymphocytes Absolute 3 09 0 60 - 4 47 Thousands/µL    Monocytes Absolute 0 37 0 17 - 1 22 Thousand/µL    Eosinophils Absolute 0 15 0 00 - 0 61 Thousand/µL    Basophils Absolute 0 06 0 00 - 0 10 Thousands/µL   APTT    Collection Time: 06/27/22 10:57 AM   Result Value Ref Range    PTT 26 23 - 37 seconds   Protime-INR    Collection Time: 06/27/22 10:57 AM   Result Value Ref Range    Protime 12 8 11 6 - 14 5 seconds    INR 1 00 0 84 - 1 19           Assessment & Recommendations:     Problem List Items Addressed This Visit        Endocrine    New onset type 2 diabetes mellitus (HCC)    Relevant Medications    metFORMIN (GLUCOPHAGE) 500 mg tablet    Other Relevant Orders    IRIS Diabetic eye exam    Ambulatory referral to Podiatry    Ambulatory Referral to Ophthalmology    Hemoglobin A1C    Microalbumin / creatinine urine ratio       Cardiovascular and Mediastinum    Essential hypertension       Other    Mixed hyperlipidemia    Relevant Medications    rosuvastatin (CRESTOR) 40 MG tablet      Other Visit Diagnoses     Pre-op examination    -  Primary Chronic pain of right knee              Pre-Op Evaluation Assessment  45 y o  female with planned surgery: RT Knee surgery  Known risk factors for perioperative complications: Diabetes mellitus  Current medications which may produce withdrawal symptoms if withheld perioperatively: none  Pre-Op Evaluation Plan  1  Further preoperative workup as follows:   - None; no further preoperative work-up is required    2  Medication Management/Recommendations:   - Patient has been instructed to avoid herbs or non-directed vitamins the week prior to surgery to ensure no drug interactions with perioperative surgical and anesthetic medications  - Patient has been instructed to avoid aspirin containing medications or non-steroidal anti-inflammatory drugs for the week preceding surgery  3  Prophylaxis for cardiac events with perioperative beta-blockers: not indicated  4  Patient requires further consultation with: None    Clearance  Patient is CLEARED for surgery without any additional cardiac testing  Recent Results (from the past 672 hour(s))   Tissue Exam    Collection Time: 06/03/22  7:35 AM   Result Value Ref Range    Case Report       Surgical Pathology Report                         Case: [id number]                                   Authorizing Provider:  Rommel Willams MD      Collected:           06/03/2022 0735              Ordering Location:     [facility] Received:            06/03/2022 5308                                     Operating Room                                                               Pathologist:           Mira Camacho MD                                                    Specimen:    Stomach, Antrum - R/o H  Pylori                                                            Final Diagnosis       A   Stomach, Antrum:  - Chronic inactive antral gastritis with regenerative epithelial changes    - No Helicobacter pylori organisms are identified with immunoperoxidase stain   - Pan keratin stain highlights benign epithelial elements  Negative for intestinal metaplasia, dysplasia or carcinoma  Additional Information       All reported additional testing was performed with appropriately reactive controls  These tests were developed and their performance characteristics determined by [facility] or appropriate performing facility, though some tests may be performed on tissues which have not been validated for performance characteristics (such as staining performed on alcohol exposed cell blocks and decalcified tissues)  Results should be interpreted with caution and in the context of the patients clinical condition  These tests may not be cleared or approved by the [place]  Food and Drug Administration, though the FDA has determined that such clearance or approval is not necessary  These tests are used for clinical purposes and they should not be regarded as investigational or for research  This laboratory has been approved by [place] [address], designated as a high-complexity laboratory and is qualified to perform these tests  Interpretation performed at [facility], [address] [address]      Gross Description          A  The specimen is received in formalin, labeled with the patient's name and hospital number, and is designated "stomach antrum biopsy-rule out H pylori"  The specimen consists of a single pink polypoid tissue fragment measuring 0 3 cm in greatest dimension  The specimen is entirely submitted in a screened cassette  Note: The estimated total formalin fixation time based upon information provided by the submitting clinician and the standard processing schedule is under 72 hours    Sorin Yoo              EKG 12 lead    Collection Time: 06/27/22 10:31 AM   Result Value Ref Range    Ventricular Rate 61 BPM    Atrial Rate 61 BPM    IA Interval 170 ms    QRSD Interval 94 ms    QT Interval 414 ms    QTC Interval 416 ms    P Axis 73 degrees    QRS Axis 70 degrees    T Wave Axis 58 degrees   Hemoglobin A1C    Collection Time: 06/27/22 10:57 AM   Result Value Ref Range    Hemoglobin A1C 8 0 (H) Normal 3 8-5 6%; PreDiabetic 5 7-6 4%;  Diabetic >=6 5%; Glycemic control for adults with diabetes <7 0% %     mg/dl   Comprehensive metabolic panel    Collection Time: 06/27/22 10:57 AM   Result Value Ref Range    Sodium 138 136 - 145 mmol/L    Potassium 4 0 3 5 - 5 3 mmol/L    Chloride 105 100 - 108 mmol/L    CO2 27 21 - 32 mmol/L    ANION GAP 6 4 - 13 mmol/L    BUN 9 5 - 25 mg/dL    Creatinine 0 56 (L) 0 60 - 1 30 mg/dL    Glucose, Fasting 174 (H) 65 - 99 mg/dL    Calcium 9 0 8 3 - 10 1 mg/dL    AST 34 5 - 45 U/L    ALT 48 12 - 78 U/L    Alkaline Phosphatase 69 46 - 116 U/L    Total Protein 7 9 6 4 - 8 2 g/dL    Albumin 3 8 3 5 - 5 0 g/dL    Total Bilirubin 0 38 0 20 - 1 00 mg/dL    eGFR 107 ml/min/1 73sq m   Lipid Panel with Direct LDL reflex    Collection Time: 06/27/22 10:57 AM   Result Value Ref Range    Cholesterol 209 (H) See Comment mg/dL    Triglycerides 258 (H) See Comment mg/dL    HDL, Direct 48 (L) >=50 mg/dL    LDL Calculated 109 (H) 0 - 100 mg/dL   TSH, 3rd generation with Free T4 reflex    Collection Time: 06/27/22 10:57 AM   Result Value Ref Range    TSH 3RD GENERATON 1 720 0 450 - 4 500 uIU/mL   CBC and differential    Collection Time: 06/27/22 10:57 AM   Result Value Ref Range    WBC 7 17 4 31 - 10 16 Thousand/uL    RBC 5 06 3 81 - 5 12 Million/uL    Hemoglobin 14 7 11 5 - 15 4 g/dL    Hematocrit 43 4 34 8 - 46 1 %    MCV 86 82 - 98 fL    MCH 29 1 26 8 - 34 3 pg    MCHC 33 9 31 4 - 37 4 g/dL    RDW 13 3 11 6 - 15 1 %    MPV 12 5 8 9 - 12 7 fL    Platelets 394 541 - 861 Thousands/uL    nRBC 0 /100 WBCs    Neutrophils Relative 49 43 - 75 %    Immat GRANS % 0 0 - 2 %    Lymphocytes Relative 43 14 - 44 %    Monocytes Relative 5 4 - 12 %    Eosinophils Relative 2 0 - 6 %    Basophils Relative 1 0 - 1 % Neutrophils Absolute 3 48 1 85 - 7 62 Thousands/µL    Immature Grans Absolute 0 02 0 00 - 0 20 Thousand/uL    Lymphocytes Absolute 3 09 0 60 - 4 47 Thousands/µL    Monocytes Absolute 0 37 0 17 - 1 22 Thousand/µL    Eosinophils Absolute 0 15 0 00 - 0 61 Thousand/µL    Basophils Absolute 0 06 0 00 - 0 10 Thousands/µL   APTT    Collection Time: 06/27/22 10:57 AM   Result Value Ref Range    PTT 26 23 - 37 seconds   Protime-INR    Collection Time: 06/27/22 10:57 AM   Result Value Ref Range    Protime 12 8 11 6 - 14 5 seconds    INR 1 00 0 84 - 1 19       Diabetic Foot Exam    Patient's shoes and socks removed  Right Foot/Ankle   Right Foot Inspection  Skin Exam: skin normal  Skin not intact, no dry skin, no warmth, no callus, no erythema, no maceration, no abnormal color, no pre-ulcer, no ulcer and no callus  Toe Exam: ROM and strength within normal limits  Sensory   Vibration: intact  Proprioception: intact  Monofilament testing: intact    Vascular  Capillary refills: < 3 seconds  The right DP pulse is 2+  The right PT pulse is 2+  Left Foot/Ankle  Left Foot Inspection  Skin Exam: skin normal  Skin not intact, no dry skin, no warmth, no erythema, no maceration, normal color, no pre-ulcer, no ulcer and no callus  Toe Exam: ROM and strength within normal limits  Sensory   Vibration: intact  Proprioception: intact  Monofilament testing: intact    Vascular  Capillary refills: < 3 seconds  The left DP pulse is 2+  The left PT pulse is 2+       Assign Risk Category  No deformity present  No loss of protective sensation  No weak pulses  Risk: 0       [facility]  [address] [place] [address]  Phone#  [phone]  Fax#  [phone]

## 2022-06-29 LAB
LEFT EYE DIABETIC RETINOPATHY: NORMAL
LEFT EYE IMAGE QUALITY: NORMAL
LEFT EYE MACULAR EDEMA: NORMAL
LEFT EYE OTHER RETINOPATHY: NORMAL
RIGHT EYE DIABETIC RETINOPATHY: NORMAL
RIGHT EYE IMAGE QUALITY: NORMAL
RIGHT EYE MACULAR EDEMA: NORMAL
RIGHT EYE OTHER RETINOPATHY: NORMAL
SEVERITY (EYE EXAM): NORMAL

## 2022-06-29 PROCEDURE — 2025F 7 FLD RTA PHOTO W/O RTNOPTHY: CPT | Performed by: FAMILY MEDICINE

## 2022-06-30 NOTE — PRE-PROCEDURE INSTRUCTIONS
My Surgical Experience    The following information was developed to assist you to prepare for your operation  What do I need to do before coming to the hospital?   Arrange for a responsible person to drive you to and from the hospital    Arrange care for your children at home  Children are not allowed in the recovery areas of the hospital   Plan to wear clothing that is easy to put on and take off  If you are having shoulder surgery, wear a shirt that buttons or zippers in the front  Bathing  o Shower the evening before and the morning of your surgery with an antibacterial soap  Please refer to the Pre Op Showering Instructions for Surgery Patients Sheet   o Remove nail polish and all body piercing jewelry  o Do not shave any body part for at least 24 hours before surgery-this includes face, arms, legs and upper body  Food  o Nothing to eat or drink after midnight the night before your surgery  This includes candy and chewing gum  o Exception: If your surgery is after 12:00pm (noon), you may have clear liquids such as 7-Up®, ginger ale, apple or cranberry juice, Jell-O®, water, or clear broth until 8:00 am  o Do not drink milk or juice with pulp on the morning before surgery  o Do not drink alcohol 24 hours before surgery  Medicine  o Follow instructions you received from your surgeon about which medicines you may take on the day of surgery  o If instructed to take medicine on the morning of surgery, take pills with just a small sip of water  Call your prescribing doctor for specific infroamtion on what to do if you take insulin    What should I bring to the hospital?    Bring:  Antonia Castaneda or a walker, if you have them, for foot or knee surgery   A list of the daily medicines, vitamins, minerals, herbals and nutritional supplements you take   Include the dosages of medicines and the time you take them each day   Glasses, dentures or hearing aids   Minimal clothing; you will be wearing hospital sleepwear   Photo ID; required to verify your identity   If you have a Living Will or Power of , bring a copy of the documents   If you have an ostomy, bring an extra pouch and any supplies you use    Do not bring   Medicines or inhalers   Money, valuables or jewelry    What other information should I know about the day of surgery?  Notify your surgeons if you develop a cold, sore throat, cough, fever, rash or any other illness   Report to the Ambulatory Surgical/Same Day Surgery Unit   You will be instructed to stop at Registration only if you have not been pre-registered   Inform your  fi they do not stay that they will be asked by the staff to leave a phone number where they can be reached   Be available to be reached before surgery  In the event the operating room schedule changes, you may be asked to come in earlier or later than expected    *It is important to tell your doctor and others involved in your health care if you are taking or have been taking any non-prescription drugs, vitamins, minerals, herbals or other nutritional supplements  Any of these may interact with some food or medicines and cause a reaction      Pre-Surgery Instructions:   Medication Instructions    amLODIPine (NORVASC) 5 mg tablet Take day of surgery   ibuprofen (MOTRIN) 200 mg tablet Stop taking 7 days prior to surgery   lisinopril (ZESTRIL) 10 mg tablet Hold day of surgery   metFORMIN (GLUCOPHAGE) 500 mg tablet Hold day of surgery   metoprolol succinate (TOPROL-XL) 50 mg 24 hr tablet Take day of surgery   rosuvastatin (CRESTOR) 40 MG tablet Hold day of surgery   vitamin E, tocopherol, 400 units capsule Stop taking 7 days prior to surgery

## 2022-07-04 ENCOUNTER — ANESTHESIA EVENT (OUTPATIENT)
Dept: PERIOP | Facility: HOSPITAL | Age: 52
End: 2022-07-04
Payer: COMMERCIAL

## 2022-07-05 ENCOUNTER — ANESTHESIA (OUTPATIENT)
Dept: PERIOP | Facility: HOSPITAL | Age: 52
End: 2022-07-05
Payer: COMMERCIAL

## 2022-07-05 ENCOUNTER — HOSPITAL ENCOUNTER (OUTPATIENT)
Facility: HOSPITAL | Age: 52
Setting detail: OUTPATIENT SURGERY
Discharge: HOME | End: 2022-07-05
Attending: ORTHOPAEDIC SURGERY | Admitting: ORTHOPAEDIC SURGERY
Payer: COMMERCIAL

## 2022-07-05 VITALS
WEIGHT: 293 LBS | HEIGHT: 66 IN | DIASTOLIC BLOOD PRESSURE: 69 MMHG | HEART RATE: 72 BPM | TEMPERATURE: 97.2 F | BODY MASS INDEX: 47.09 KG/M2 | SYSTOLIC BLOOD PRESSURE: 138 MMHG | RESPIRATION RATE: 20 BRPM | OXYGEN SATURATION: 95 %

## 2022-07-05 DIAGNOSIS — S83.241A OTHER TEAR OF MEDIAL MENISCUS, CURRENT INJURY, RIGHT KNEE, INITIAL ENCOUNTER: Primary | ICD-10-CM

## 2022-07-05 LAB — GLUCOSE SERPL-MCNC: 158 MG/DL (ref 65–140)

## 2022-07-05 PROCEDURE — 29881 ARTHRS KNE SRG MNISECTMY M/L: CPT | Performed by: ORTHOPAEDIC SURGERY

## 2022-07-05 PROCEDURE — 29881 ARTHRS KNE SRG MNISECTMY M/L: CPT | Performed by: PHYSICIAN ASSISTANT

## 2022-07-05 PROCEDURE — 82948 REAGENT STRIP/BLOOD GLUCOSE: CPT

## 2022-07-05 RX ORDER — LIDOCAINE HYDROCHLORIDE 20 MG/ML
INJECTION, SOLUTION EPIDURAL; INFILTRATION; INTRACAUDAL; PERINEURAL AS NEEDED
Status: DISCONTINUED | OUTPATIENT
Start: 2022-07-05 | End: 2022-07-05

## 2022-07-05 RX ORDER — FENTANYL CITRATE/PF 50 MCG/ML
25 SYRINGE (ML) INJECTION
Status: COMPLETED | OUTPATIENT
Start: 2022-07-05 | End: 2022-07-05

## 2022-07-05 RX ORDER — MIDAZOLAM HYDROCHLORIDE 2 MG/2ML
INJECTION, SOLUTION INTRAMUSCULAR; INTRAVENOUS AS NEEDED
Status: DISCONTINUED | OUTPATIENT
Start: 2022-07-05 | End: 2022-07-05

## 2022-07-05 RX ORDER — DEXAMETHASONE SODIUM PHOSPHATE 4 MG/ML
INJECTION, SOLUTION INTRA-ARTICULAR; INTRALESIONAL; INTRAMUSCULAR; INTRAVENOUS; SOFT TISSUE AS NEEDED
Status: DISCONTINUED | OUTPATIENT
Start: 2022-07-05 | End: 2022-07-05

## 2022-07-05 RX ORDER — OXYCODONE HYDROCHLORIDE AND ACETAMINOPHEN 5; 325 MG/1; MG/1
1 TABLET ORAL EVERY 4 HOURS PRN
Status: DISCONTINUED | OUTPATIENT
Start: 2022-07-05 | End: 2022-07-05 | Stop reason: HOSPADM

## 2022-07-05 RX ORDER — BUPIVACAINE HYDROCHLORIDE AND EPINEPHRINE 2.5; 5 MG/ML; UG/ML
INJECTION, SOLUTION INFILTRATION; PERINEURAL AS NEEDED
Status: DISCONTINUED | OUTPATIENT
Start: 2022-07-05 | End: 2022-07-05 | Stop reason: HOSPADM

## 2022-07-05 RX ORDER — ASPIRIN 325 MG
325 TABLET, DELAYED RELEASE (ENTERIC COATED) ORAL DAILY
Qty: 21 TABLET | Refills: 0 | Status: SHIPPED | OUTPATIENT
Start: 2022-07-05

## 2022-07-05 RX ORDER — SODIUM CHLORIDE, SODIUM LACTATE, POTASSIUM CHLORIDE, CALCIUM CHLORIDE 600; 310; 30; 20 MG/100ML; MG/100ML; MG/100ML; MG/100ML
125 INJECTION, SOLUTION INTRAVENOUS CONTINUOUS
Status: DISCONTINUED | OUTPATIENT
Start: 2022-07-05 | End: 2022-07-05 | Stop reason: HOSPADM

## 2022-07-05 RX ORDER — ONDANSETRON 2 MG/ML
INJECTION INTRAMUSCULAR; INTRAVENOUS AS NEEDED
Status: DISCONTINUED | OUTPATIENT
Start: 2022-07-05 | End: 2022-07-05

## 2022-07-05 RX ORDER — MAGNESIUM HYDROXIDE 1200 MG/15ML
LIQUID ORAL AS NEEDED
Status: DISCONTINUED | OUTPATIENT
Start: 2022-07-05 | End: 2022-07-05 | Stop reason: HOSPADM

## 2022-07-05 RX ORDER — FENTANYL CITRATE 50 UG/ML
INJECTION, SOLUTION INTRAMUSCULAR; INTRAVENOUS AS NEEDED
Status: DISCONTINUED | OUTPATIENT
Start: 2022-07-05 | End: 2022-07-05

## 2022-07-05 RX ORDER — PROPOFOL 10 MG/ML
INJECTION, EMULSION INTRAVENOUS AS NEEDED
Status: DISCONTINUED | OUTPATIENT
Start: 2022-07-05 | End: 2022-07-05

## 2022-07-05 RX ADMIN — CEFAZOLIN 3000 MG: 1 INJECTION, POWDER, FOR SOLUTION INTRAVENOUS at 13:32

## 2022-07-05 RX ADMIN — FENTANYL CITRATE 25 MCG: 50 INJECTION, SOLUTION INTRAMUSCULAR; INTRAVENOUS at 15:09

## 2022-07-05 RX ADMIN — PROPOFOL 30 MG: 10 INJECTION, EMULSION INTRAVENOUS at 13:52

## 2022-07-05 RX ADMIN — OXYCODONE HYDROCHLORIDE AND ACETAMINOPHEN 1 TABLET: 5; 325 TABLET ORAL at 15:26

## 2022-07-05 RX ADMIN — ONDANSETRON 4 MG: 2 INJECTION INTRAMUSCULAR; INTRAVENOUS at 13:40

## 2022-07-05 RX ADMIN — FENTANYL CITRATE 50 MCG: 50 INJECTION INTRAMUSCULAR; INTRAVENOUS at 13:51

## 2022-07-05 RX ADMIN — FENTANYL CITRATE 25 MCG: 50 INJECTION, SOLUTION INTRAMUSCULAR; INTRAVENOUS at 14:35

## 2022-07-05 RX ADMIN — MIDAZOLAM 2 MG: 1 INJECTION INTRAMUSCULAR; INTRAVENOUS at 13:26

## 2022-07-05 RX ADMIN — SODIUM CHLORIDE, SODIUM LACTATE, POTASSIUM CHLORIDE, AND CALCIUM CHLORIDE 125 ML/HR: .6; .31; .03; .02 INJECTION, SOLUTION INTRAVENOUS at 12:26

## 2022-07-05 RX ADMIN — PROPOFOL 200 MG: 10 INJECTION, EMULSION INTRAVENOUS at 13:35

## 2022-07-05 RX ADMIN — FENTANYL CITRATE 25 MCG: 50 INJECTION, SOLUTION INTRAMUSCULAR; INTRAVENOUS at 15:01

## 2022-07-05 RX ADMIN — LIDOCAINE HYDROCHLORIDE 100 MG: 20 INJECTION, SOLUTION EPIDURAL; INFILTRATION; INTRACAUDAL; PERINEURAL at 13:35

## 2022-07-05 RX ADMIN — FENTANYL CITRATE 50 MCG: 50 INJECTION INTRAMUSCULAR; INTRAVENOUS at 13:35

## 2022-07-05 RX ADMIN — DEXAMETHASONE SODIUM PHOSPHATE 8 MG: 4 INJECTION INTRA-ARTICULAR; INTRALESIONAL; INTRAMUSCULAR; INTRAVENOUS; SOFT TISSUE at 13:39

## 2022-07-05 RX ADMIN — FENTANYL CITRATE 25 MCG: 50 INJECTION, SOLUTION INTRAMUSCULAR; INTRAVENOUS at 14:57

## 2022-07-05 NOTE — INTERVAL H&P NOTE
H&P reviewed  After examining the patient I find no changes in the patients condition since the H&P had been written  In the preoperative workup, she was diagnosed with type 2 diabetes, which is being managed by her primary care physician  She denies any new injuries or symptoms to the right knee    All of her perioperative questions were addressed at bedside today    Vitals:    07/05/22 1211   BP: 150/91   Pulse: 89   Resp: 18   Temp: (!) 97 °F (36 1 °C)   SpO2: 96%     Jim Hess PA-C

## 2022-07-05 NOTE — ANESTHESIA PREPROCEDURE EVALUATION
Procedure:  KNEE ARTHROSCOPY  MEDIAL MENISCECTOMY (Right Knee)    Relevant Problems   CARDIO   (+) Essential hypertension   (+) Mixed hyperlipidemia      ENDO   (+) New onset type 2 diabetes mellitus (HCC)        Physical Exam    Airway    Mallampati score: III  TM Distance: >3 FB  Neck ROM: full     Dental   No notable dental hx     Cardiovascular  Rhythm: regular, Rate: normal,     Pulmonary  Breath sounds clear to auscultation, Decreased breath sounds,     Other Findings        Anesthesia Plan  ASA Score- 2     Anesthesia Type- general with ASA Monitors  Additional Monitors:   Airway Plan: LMA  Plan Factors-Exercise tolerance (METS): >4 METS  Chart reviewed  Existing labs reviewed  Patient summary reviewed  Patient is not a current smoker  Induction- intravenous  Postoperative Plan- Plan for postoperative opioid use  Informed Consent- Anesthetic plan and risks discussed with patient  I personally reviewed this patient with the CRNA  Discussed and agreed on the Anesthesia Plan with the CRNA  Dania Cramer

## 2022-07-05 NOTE — OP NOTE
OPERATIVE REPORT  PATIENT NAME: Hola Meyers    :  1970  MRN: [id number]  Pt Location: [facility] OR ROOM 03    SURGERY DATE: 2022    Surgeon(s) and Role:     * Andrea Hughes MD - Primary     * Cinderella Cogan, PA-C - Assisting necessary for the procedure for assistance with improved visualization due to the minimally invasive arthroscopic techniques utilized for the operation for assistance with utilization of the camera and shaver  Omelia Belkys A  T C  And OTSC 2nd assistant    Preop Diagnosis:  Other tear of medial meniscus, current injury, right knee, initial encounter [S83 241A]    Post-Op Diagnosis Codes:     * Other tear of medial meniscus, current injury, right knee, initial encounter [S83 241A] and chondromalacia lateral tibial plateau    Procedure:  Right knee arthroscopy with medial meniscectomy and chondroplasty lateral tibial plateau    Specimen(s):  * No specimens in log *    Estimated Blood Loss:   Minimal    Drains:  * No LDAs found *    Anesthesia Type:   General    Operative Indications: Other tear of medial meniscus, current injury, right knee, initial encounter Elena French is a 58-year-old female who has been suffering with right knee pain and was found on MRI to have a posterior horn medial meniscus tear  She had failed non operative measures and wished to undergo a right knee arthroscopy for medial meniscectomy  She understood the risks and benefits of that procedure wished to go ahead  The risks are inclusive of but not limited to infection, stiffness, nerve or blood vessel injury causing numbness pain and weakness, blood clots, failure to alleviate discomfort, recurrence of tear and pain, failure to achieve anticipated results, and need for further surgery  Operative Findings:  Right knee exam under anesthesia demonstrated range of motion 0-110 preoperatively with good stability to varus and valgus stress as well as anterior and posterior drawer  Intra-articular findings demonstrated grade 3 changes along the undersurface of the patella but no loose bodies in either gutter  ACL and PCL were both intact  Lateral compartment demonstrated intact articular cartilage on the lateral femoral condyle and intact lateral meniscus however there was a large flap of tissue off of the lateral tibial plateau requiring chondroplasty  Medial meniscus demonstrated a posterior horn medial meniscus tear with a shaver to a stable rim of tissue  Medial femoral condyle had grade 2 changes and medial tibial plateau was intact  Complications:   None    Procedure and Technique:  Carlos Alberto Esposito was taken to the operating room and placed supine on the OR table  She was given preoperative IV antibiotics and general anesthesia was induced by the attending anesthesiologist   The right knee was taken through exam under anesthesia as described above  Right lower extremity was then prepped and draped in usual sterile fashion  Surgical time-out was taken  We injected local anesthetic in both portals anterolateral portal made with 11 blade  Diagnostic arthroscopy was begun an anteromedial portal made with 11 blade  We demonstrated grade 3 articular cartilage changes along the undersurface of the patella but no loose bodies in either gutter  Trochlear groove was intact  Lateral compartment demonstrated good appearance of femoral condyle articular cartilage and the meniscus on the lateral side was intact  There were however significant changes with a large flap of tissue on the lateral tibial plateau requiring debridement with a shaver to a stable rim of tissue  ACL and PCL were both intact  Medial meniscus demonstrated a tear in the posterior horn that was a flap tear  We did debride this back to a stable rim of tissue with use of a shaver  The medial femoral condyle articular cartilage had grade 2 changes although the tibial plateau was intact    Once we were pleased with the stability of the meniscal tissue and articular cartilage tissue, we removed the arthroscopic equipment  We closed the portals with 4-0 nylon suture  Local anesthetic injected into the knee and dry, sterile dressings were applied with an Ace bandage  She tolerated the procedure well and transferred to recovery room stable condition  She will follow up in 1 week for suture removal   She will be on aspirin for DVT prophylaxis  She will start physical therapy soon as possible     I was present for the entire procedure and A qualified resident physician was not available    Patient Disposition:  PACU       SIGNATURE: Myranda Land MD  DATE: July 5, 2022  TIME: 2:07 PM

## 2022-07-05 NOTE — DISCHARGE INSTRUCTIONS
INSTRUCTIONS FOLLOWING YOUR KNEE ARTHROSCOPY    FIRST FOLLOW-UP VISIT AFTER SURGERY     Call the office the day after your surgery & make an appointment for 1 week to see Luis Felipe Montes PA-C   At that appointment, your stitches will be removed  CANE OR CRUTCHES     You may put as much weight on your leg as tolerated when using the crutches/cane  When you feel that the crutches/cane is not necessary, you may discontinue its use  Use common sense, let pain be your guide for your activities  Climbing stairs, walking and sitting are all permitted as you tolerate them  EXERCISE PROGRAM     At your 1st follow-up visit you will be given a prescription for physical therapy if you have not already been given one  SHOWERING     Keep original bandage on for 48 hours after surgery & replace with band-aids  You should keep the band-aids on until you see Maykel Geronimo  After 48 hours, it is okay to get your incision wet & you may shower  Do not take any baths or soak in a hot tub or pool until your stitches have been removed  INCISION SITE     You may notice a small amount of blood on your bandage, about the size of a quarter, this is normal and there is no need to worry  If you notice uncontrollable or excessive bleeding, oozing, or redness of the wound please call the office  SWELLING AND DISCOMFORT     You will experience some pain and discomfort after surgery  If the discomfort is mild, you can take 1 or 2 Tylenol, every 4-6 hours as needed, instead of the prescribed pain medication  You will notice some swelling of your knee after surgery, this is normal      To help reduce the swelling, elevate your leg as much as possible the first two days  In addition, you may place ice on your knee to reduce swelling  Place a plastic bag filled with ice, wrapped in a thin towel, on your knee  Do not keep ice on for more than 15-20 minutes, repeat 4-5 TIMES A DAY, IF POSSIBLE       BLOOD CLOT PREVENTION    Unless otherwise instructed, take one 325 mg aspirin daily for the first 3 weeks following surgery  This is to help decrease the risk of blood clots  If at any time you have discomfort, swelling, or redness in the calf (behind the leg between the knee and the ankle)  or difficulty breathing or heaviness in the chest, please call the doctor immediately  TEMPERATURE     A temperature of less than 101 degrees is common for the first 1-2 days after surgery  If your temperature is elevated above 101 degrees, call the office  IF YOU HAVE ANY OTHER CONCERNS OR QUESTIONS AT ANY TIME, DO NOT HESITATE TO CALL THE OFFICE [phone]

## 2022-07-05 NOTE — ANESTHESIA POSTPROCEDURE EVALUATION
Post-Op Assessment Note    CV Status:  Stable  Pain Score: 0    Pain management: adequate     Mental Status:  Alert and awake   Hydration Status:  Euvolemic and stable   PONV Controlled:  Controlled   Airway Patency:  Patent and adequate      Post Op Vitals Reviewed: Yes      Staff: CRNA         No complications documented      /67 (07/05/22 1415)    Temp (!) 97 2 °F (36 2 °C) (07/05/22 1415)    Pulse 82 (07/05/22 1415)   Resp 18 (07/05/22 1415)    SpO2 95 % (07/05/22 1415)

## 2022-07-07 ENCOUNTER — EVALUATION (OUTPATIENT)
Dept: PHYSICAL THERAPY | Facility: CLINIC | Age: 52
End: 2022-07-07
Payer: COMMERCIAL

## 2022-07-07 DIAGNOSIS — S83.241A OTHER TEAR OF MEDIAL MENISCUS, CURRENT INJURY, RIGHT KNEE, INITIAL ENCOUNTER: ICD-10-CM

## 2022-07-07 PROCEDURE — 97110 THERAPEUTIC EXERCISES: CPT | Performed by: PHYSICAL THERAPIST

## 2022-07-07 PROCEDURE — 97161 PT EVAL LOW COMPLEX 20 MIN: CPT | Performed by: PHYSICAL THERAPIST

## 2022-07-07 NOTE — PROGRESS NOTES
PT Evaluation     Today's date: 2022  Patient name: Larissa Mckeon  : 1970  MRN: [id number]  Referring provider: Tran Alexander*  Dx:   Encounter Diagnosis     ICD-10-CM    1  Other tear of medial meniscus, current injury, right knee, initial encounter  S82 361A Ambulatory Referral to Physical Therapy                  Assessment  Assessment details: 2022: Larissa Mckeon is a 45 y o  female who presents with pain, decreased strength, decreased ROM, decreased joint mobility, joint effusion and significant OA  Due to these impairments, patient has difficulty performing ADL's, recreational activities, engaging in social activities, ambulation, stair negotiation, lifting/carrying  Patient's clinical presentation is consistent with their referring diagnosis of Other tear of medial meniscus, current injury, right knee, initial encounter  Plan: Ambulatory Referral to Physical Therapy  Patient has been educated in post-op contraindications / precautions, wound care, gait training w/ SPC, proper use of CP, weight bearing status (WBAT, but limit squatting, stairs, foot planted twisting), home exercise program and plan of care  Patient would benefit from skilled physical therapy services to address their aforementioned functional limitations and progress towards prior level of function and independence with home exercise program      Impairments: abnormal gait, abnormal or restricted ROM, activity intolerance, impaired physical strength, lacks appropriate home exercise program, pain with function, weight-bearing intolerance and poor body mechanics  Understanding of Dx/Px/POC: good   Prognosis: good    Goals  Short Term Goals: Target Date 4 weeks (2022)  1  Initiate and advance HEP  2 Improve AROM R knee to be equal to L w/o pain to prepare for reciprocal stairs w/o c/o   3  Improve R LE strength to 5/5 to prepare for functional LTG  4  Establish indep ambulation all surfaces w/o AD      Long Term Goals: Target Date 12 weeks (10/6/2022)  1  Indep with HEP  2  Pt to tolerate reciprocal stairs w/o handrail w/o pain to allow pt to move her daughter into college  3  Pt to tolerate prolonged walking on uneven terrain w/o c/o   4  Improve balance such that pt can maintain R/L SLS x 30"  5  Pt to tolerate prolonged/static squat x 15" w/o c/o needed for gardening  Plan  Plan details:     Patient would benefit from: PT eval and skilled physical therapy  Planned modality interventions: cryotherapy, thermotherapy: hydrocollator packs and unattended electrical stimulation  Planned therapy interventions: manual therapy, neuromuscular re-education, therapeutic activities, therapeutic exercise, home exercise program, patient education, stretching, functional ROM exercises and balance/weight bearing training  Frequency: 2x week  Plan of Care beginning date: 2022  Plan of Care expiration date: 10/6/2022  Treatment plan discussed with: patient        Subjective Evaluation    History of Present Illness  Mechanism of injury: Subjective 2022: Pt reports her right knee started bothering her in 2022, but became severely worse in May 2022 while she was putting shoes on balancing on R LE felt a painful pop  Pain -10/10  She went to the ED and was referred to ortho  She failed conservative management, and proceeded w/ surgery     Pain  Current pain ratin  At best pain ratin  At worst pain ratin  Relieving factors: rest and ice  Aggravating factors: walking, standing and stair climbing (uneven terrain)  Progression: improved    Social Support  Steps to enter house: yes  Stairs in house: yes   Lives in: multiple-level home      Diagnostic Tests  X-ray: abnormal  MRI studies: abnormal  Treatments  Current treatment: physical therapy  Patient Goals  Patient goals for therapy: increased strength, independence with ADLs/IADLs, return to sport/leisure activities, decreased pain and increased motion          Objective    AROM:   R  L     7/7/2022 7/7/2022  Knee flex sup/prone 85/85  120/112  Knee ext Qset/SLR -5/-9*  +3/+3    MMT:    R  L     7/7/2022 7/7/2022  Knee flex  4-/5*  5/5  Knee exten  3+/5*  5/5  Hip flexion  4+/5  5/5  Hip ER   4-/5*  5/5  Hip IR   4/5  5/5  Hip exten  4/5  5/5  Ankle DF  5/5  5/5  Ankle PF  4/5  5/5  Hip abd  5/5  5/5      Observation:   7/7/2022 - pt has removed the post op dressing and replaced it w/ 2 band-aids, no redness or drainage; 2 suture sites    Balance:   7/7/2022  SLS R w/ EO:  NT  SLS L w/ EO:  NT    Function:  7/7/2022: stairs are non-recip w/ rail  Pt ambulates w/ RW (instructed for SPC today during eval)  Pt needs UE assist for sit to stand  She cannot squat  Pt notes difficulty/pain w/ walking on uneven terrain  Palpation:  7/7/2022 - Homen's sign (-) B/L; patellar mobility WNL B/L       Access Code: [email or web address]  URL: [email or web address]  Date: 07/07/2022  Prepared by:  Sinai Castillo 90    Exercises  · Supine Heel Slide with Strap - 1 x daily - 7 x weekly - 10 reps - 10 hold  · Long Sitting Calf Stretch with Strap - 1 x daily - 7 x weekly - 3 reps - 20 hold  · Supine Straight Leg Raises - 1 x daily - 7 x weekly - 2 sets - 5 reps  · Prone Knee Flexion AROM - 1 x daily - 7 x weekly - 2 sets - 10 reps  · Prone Quadriceps Set - 1 x daily - 7 x weekly - 10 reps - 5 hold  · Heel rises with counter support - 1 x daily - 7 x weekly - 1 sets - 10 reps  · Toe Raises with Counter Support - 1 x daily - 7 x weekly - 1 sets - 10 reps  · Standing Marching - 1 x daily - 7 x weekly - 1 sets - 15 reps          Precautions:   Past Medical History:   Diagnosis Date    Asthma     Broken tooth     upper right-back    High cholesterol     Hypertension     Morbid obesity with BMI of 45 0-49 9, adult (HCC)     Pain in right knee     MRI on 6/7/22     POC exp 10/6/2022  DOS: 7/5/2022  SOC: 7/7/2022  FOTO: 7/7/2022  DAILY TREATMENT LOG    DATE 7/7/2022       VISIT # AUTH        AUTH DATE        MANUALS                        Neuro Re-Ed                Alt step taps        SLS        Stand alt hip abd w/ TB        Stand alt hip ext w/ TB                        Ther Ex        bike        DF w/ SOS 20"x3       Heel slide w/ SOS 10x10"       qset SLR 2x5       Prone knee flexion 1x10       Stand B/L HR & TR 1x10 ea       Stand alt hip flexion 1x15       bridges        TB hams        LAQ w/ Add                Gait Training                        Modalities

## 2022-07-11 ENCOUNTER — OFFICE VISIT (OUTPATIENT)
Dept: PHYSICAL THERAPY | Facility: CLINIC | Age: 52
End: 2022-07-11
Payer: COMMERCIAL

## 2022-07-11 DIAGNOSIS — S83.241A OTHER TEAR OF MEDIAL MENISCUS, CURRENT INJURY, RIGHT KNEE, INITIAL ENCOUNTER: Primary | ICD-10-CM

## 2022-07-11 PROCEDURE — 97112 NEUROMUSCULAR REEDUCATION: CPT | Performed by: PHYSICAL THERAPIST

## 2022-07-11 PROCEDURE — 97110 THERAPEUTIC EXERCISES: CPT | Performed by: PHYSICAL THERAPIST

## 2022-07-11 NOTE — PROGRESS NOTES
Daily Note     Today's date: 2022  Patient name: Joe Reddy  : 1970  MRN: [id number]  Referring provider: Jose Elias Mullins*  Dx:   Encounter Diagnosis     ICD-10-CM    1  Other tear of medial meniscus, current injury, right knee, initial encounter  B59 [id number]                   Subjective: Pt states pain is 3/10 at worst, mostly at night and longer distance walking  She no longer uses AD for ambulation  Objective: See treatment diary below;     Assessment: Tolerated treatment well  Patient demonstrates near full knee ROM already  She ambulates w/o AD  Pt is progressing well  She reports some difficulty and discomfort w/ quad setting and continues w/ quad lag  She will benefit from continued focus on quad strengthening and full extension  Plan: Continue per plan of care  Access Code: [email or web address]  URL: [email or web address] ([email or web address]  Date: 2022  Prepared by:  Sheila Small    Exercises  · Supine Heel Slide with Strap - 1 x daily - 7 x weekly - 10 reps - 10 hold  · Long Sitting Calf Stretch with Strap - 1 x daily - 7 x weekly - 3 reps - 20 hold  · Supine Straight Leg Raises - 1 x daily - 7 x weekly - 2 sets - 5 reps  · Prone Knee Flexion AROM - 1 x daily - 7 x weekly - 2 sets - 10 reps  · Prone Quadriceps Set - 1 x daily - 7 x weekly - 10 reps - 5 hold  · Heel rises with counter support - 1 x daily - 7 x weekly - 1 sets - 10 reps  · Toe Raises with Counter Support - 1 x daily - 7 x weekly - 1 sets - 10 reps  · Standing Marching - 1 x daily - 7 x weekly - 1 sets - 15 reps          Precautions:   Past Medical History:   Diagnosis Date    Asthma     Broken tooth     upper right-back    High cholesterol     Hypertension     Morbid obesity with BMI of 45 0-49 9, adult (HCC)     Pain in right knee     MRI on 22     POC exp 10/6/2022  DOS: 2022  SOC: 2022  FOTO: 2022  DAILY TREATMENT LOG    DATE 2022      VISIT #  2      AUTH        AUTH DATE 10/7/22      MANUALS                        Neuro Re-Ed  10'              Alt 2 cone taps  1x10 ea occas use of rail      SLS        Stand steamboats w/ TB  grn TB @ knees 2x5 ea w/ rail                              Ther Ex  30'      bike  Full ROM 5'      DF w/ SOS 20"x3       Heel slide w/ SOS 10x10" 10x10"      qset SLR 2x5 qset w/ Heel roll 5"x10; SLR 1x10      Prone knee flexion 1x10 1x15      Stand B/L HR & TR 1x10 ea HR off step 1x10      Stand alt hip flexion 1x15       bridges  3" 2x10      TB hams  grn 2x10      LAQ w/ Add  3" 2x10              Gait Training                        Modalities        CP to end R knee  5'

## 2022-07-13 ENCOUNTER — OFFICE VISIT (OUTPATIENT)
Dept: OBGYN CLINIC | Facility: CLINIC | Age: 52
End: 2022-07-13

## 2022-07-13 ENCOUNTER — TELEPHONE (OUTPATIENT)
Dept: BARIATRICS | Facility: CLINIC | Age: 52
End: 2022-07-13

## 2022-07-13 VITALS
DIASTOLIC BLOOD PRESSURE: 82 MMHG | HEIGHT: 66 IN | WEIGHT: 293 LBS | SYSTOLIC BLOOD PRESSURE: 135 MMHG | HEART RATE: 77 BPM | BODY MASS INDEX: 47.09 KG/M2

## 2022-07-13 DIAGNOSIS — Z98.890 S/P ARTHROSCOPIC PARTIAL MEDIAL MENISCECTOMY: Primary | ICD-10-CM

## 2022-07-13 PROCEDURE — 99024 POSTOP FOLLOW-UP VISIT: CPT | Performed by: PHYSICIAN ASSISTANT

## 2022-07-13 NOTE — PROGRESS NOTES
Assessment/Plan:  1  S/P arthroscopic partial medial meniscectomy       The patient is doing well and will continue physical therapy for range of motion strengthening  She will likely be discharged in another 1-2 weeks  She can gradually increase her activity as tolerated  She will call immediately if she develops any calf pain or cramping as this could be indicative of DVT  She will follow up as needed  Subjective:   Deyvi Chung is a 45 y o  female who presents today for follow-up of her right knee, now about a week status post arthroscopic medial meniscectomy  She is doing well notes minimal pain  She is progressing with physical therapy  She notes good sensation of the right lower extremity  She notes good range of motion and improving strength  She is ambulating without any assistive device  She denies any calf pain or cramping        Review of Systems      Past Medical History:   Diagnosis Date    Asthma     Broken tooth     upper right-back    High cholesterol     Hypertension     Morbid obesity with BMI of 45 0-49 9, adult (HCC)     Pain in right knee     MRI on 22       Past Surgical History:   Procedure Laterality Date     SECTION      EGD  2022    food in the stomach-repeat EGD on 22    LAPAROSCOPY      IN KNEE SCOPE,MED/LAT MENISECTOMY Right 2022    Procedure: KNEE ARTHROSCOPY  MEDIAL MENISCECTOMY AND CHONDRYLPLASTY;  Surgeon: Stormy Ni MD;  Location[facility];  Service: Orthopedics    TONSILLECTOMY      WISDOM TOOTH EXTRACTION         Family History   Problem Relation Age of Onset    Hypertension Mother     Stroke Mother     Diabetes Father     Hypertension Father     Heart disease Father     Thyroid disease Neg Hx        Social History     Occupational History    Not on file   Tobacco Use    Smoking status: Never Smoker    Smokeless tobacco: Never Used   Vaping Use    Vaping Use: Never used   Substance and Sexual Activity    Alcohol use: Yes     Comment: rare    Drug use: Never    Sexual activity: Not on file         Current Outpatient Medications:     aspirin (ECOTRIN) 325 mg EC tablet, Take 1 tablet (325 mg total) by mouth daily, Disp: 21 tablet, Rfl: 0    ibuprofen (MOTRIN) 200 mg tablet, Take 600 mg by mouth every 6 (six) hours as needed for mild pain Last dose 6/28/22, Disp: , Rfl:     lisinopril (ZESTRIL) 10 mg tablet, Take 1 tablet (10 mg total) by mouth daily (Patient taking differently: Take 10 mg by mouth every morning), Disp: 90 tablet, Rfl: 1    metFORMIN (GLUCOPHAGE) 500 mg tablet, Take 1 tablet (500 mg total) by mouth daily with breakfast, Disp: 90 tablet, Rfl: 1    metoprolol succinate (TOPROL-XL) 50 mg 24 hr tablet, Take 1 tablet (50 mg total) by mouth daily (Patient taking differently: Take 50 mg by mouth every morning), Disp: 90 tablet, Rfl: 1    rosuvastatin (CRESTOR) 40 MG tablet, Take 1 tablet (40 mg total) by mouth daily, Disp: 90 tablet, Rfl: 1    vitamin E, tocopherol, 400 units capsule, Take 400 Units by mouth daily Last dose 6/27/22, Disp: , Rfl:     amLODIPine (NORVASC) 5 mg tablet, Take 1 tablet (5 mg total) by mouth daily (Patient not taking: Reported on 7/13/2022), Disp: 90 tablet, Rfl: 1    No Known Allergies    Objective:  Vitals:    07/13/22 0954   BP: 135/82   Pulse: 77       Ortho Exam    Physical Exam    Right knee:  Sutures removed  Incisions clean dry and intact  No erythema appreciated  Range of motion 0-120 degrees  No calf tenderness  Sensation intact right lower extremity

## 2022-07-13 NOTE — TELEPHONE ENCOUNTER
Called pt and left message to review labs completed on 6/27/22  Advised pt that labs are acceptable for surgery, but noted elevated cholesterol levels and A1c of 8 0 indicating DM  Noted pt has already met with PCP, levels were discussed and medications were started  Requested a call back if she has any questions, but if not will check the lab work off of her workflow for sx

## 2022-07-14 ENCOUNTER — OFFICE VISIT (OUTPATIENT)
Dept: PHYSICAL THERAPY | Facility: CLINIC | Age: 52
End: 2022-07-14
Payer: COMMERCIAL

## 2022-07-14 DIAGNOSIS — S83.241A OTHER TEAR OF MEDIAL MENISCUS, CURRENT INJURY, RIGHT KNEE, INITIAL ENCOUNTER: Primary | ICD-10-CM

## 2022-07-14 PROCEDURE — 97112 NEUROMUSCULAR REEDUCATION: CPT

## 2022-07-14 PROCEDURE — 97110 THERAPEUTIC EXERCISES: CPT

## 2022-07-14 NOTE — PROGRESS NOTES
Daily Note     Today's date: 2022  Patient name: Serena White  : 1970  MRN: [id number]  Referring provider: Orestes Nieves*  Dx:   Encounter Diagnosis     ICD-10-CM    1  Other tear of medial meniscus, current injury, right knee, initial encounter  Q85 369P                   Subjective: Pt reports that she was sore last night and still a little bit this morning, she reports overdoing it yesterday, she was using her mini ladder to water her plants and up and down the stairs with laundry  Pt reports that she saw the MD yesterday for F/U and got her stitches out, they were pleased with her progress thus far  Objective: See treatment diary below      Assessment: Tolerated treatment well  Pt challenged with additional LE strengthening today isabel the posterolateral additions  No noted increases in knee soreness during or post session  Cont to progress WB progressions as tolerated  Pt edu in slowly resuming her usual activities and it is not unusual for her to be sore after finding out her subjective reports of doing more stairs than usual   Patient would benefit from continued PT      Plan: Continue per plan of care  Access Code: [email or web address]  URL: [email or web address]  Date: 2022  Prepared by:  Manny Anmol    Exercises  · Supine Heel Slide with Strap - 1 x daily - 7 x weekly - 10 reps - 10 hold  · Long Sitting Calf Stretch with Strap - 1 x daily - 7 x weekly - 3 reps - 20 hold  · Supine Straight Leg Raises - 1 x daily - 7 x weekly - 2 sets - 5 reps  · Prone Knee Flexion AROM - 1 x daily - 7 x weekly - 2 sets - 10 reps  · Prone Quadriceps Set - 1 x daily - 7 x weekly - 10 reps - 5 hold  · Heel rises with counter support - 1 x daily - 7 x weekly - 1 sets - 10 reps  · Toe Raises with Counter Support - 1 x daily - 7 x weekly - 1 sets - 10 reps  · Standing Marching - 1 x daily - 7 x weekly - 1 sets - 15 reps          Precautions:   Past Medical History:   Diagnosis Date    Asthma     Broken tooth     upper right-back    High cholesterol     Hypertension     Morbid obesity with BMI of 45 0-49 9, adult (HCC)     Pain in right knee     MRI on 6/7/22     POC exp 10/6/2022  DOS: 7/5/2022  SOC: 7/7/2022  FOTO: 7/7/2022  DAILY TREATMENT LOG    DATE 7/7/2022 7/11/2022 7/14/2022     VISIT #  2 3      AUTH  2/12 3/12     AUTH DATE  10/7/22      MANUALS                        Neuro Re-Ed  10' 10'      Clamshells    3" 2x10      Alt 2 cone taps  1x10 ea occas use of rail      SLS        Stand steamboats w/ TB  grn TB @ knees 2x5 ea w/ rail grn TB @ knees 2x5 ea w/ rail      Monster walks    GTB knees 10'x3                      Ther Ex  30' 35'     bike  Full ROM 5' Full ROM 5'      DF w/ SOS 20"x3  20"x3      Heel slide w/ SOS 10x10" 10x10" 10x10"      qset SLR 2x5 qset w/ Heel roll 5"x10; SLR 1x10 Qset w/ SLR 2x10 R/L      Prone knee flexion 1x10 1x15      Stand B/L HR & TR 1x10 ea HR off step 1x10 HR off step 2x10     Stand alt hip flexion 1x15       bridges  3" 2x10 3" 2x10      TB hams  grn 2x10 grn 2x10      LAQ w/ Add  3" 2x10 3" 2x10      Supine leg press   B/L 30# 2x10   Uni 10# 2x10      Gait Training                        Modalities        CP to end R knee  5'

## 2022-07-18 ENCOUNTER — APPOINTMENT (OUTPATIENT)
Dept: PHYSICAL THERAPY | Facility: CLINIC | Age: 52
End: 2022-07-18
Payer: COMMERCIAL

## 2022-07-18 DIAGNOSIS — I10 ESSENTIAL HYPERTENSION: ICD-10-CM

## 2022-07-18 RX ORDER — AMLODIPINE BESYLATE 5 MG/1
5 TABLET ORAL DAILY
Qty: 90 TABLET | Refills: 0 | Status: SHIPPED | OUTPATIENT
Start: 2022-07-18 | End: 2022-10-17

## 2022-07-18 RX ORDER — METOPROLOL SUCCINATE 50 MG/1
50 TABLET, EXTENDED RELEASE ORAL DAILY
Qty: 90 TABLET | Refills: 0 | Status: SHIPPED | OUTPATIENT
Start: 2022-07-18 | End: 2022-10-17

## 2022-07-19 ENCOUNTER — OFFICE VISIT (OUTPATIENT)
Dept: PHYSICAL THERAPY | Facility: CLINIC | Age: 52
End: 2022-07-19
Payer: COMMERCIAL

## 2022-07-19 DIAGNOSIS — S83.241A OTHER TEAR OF MEDIAL MENISCUS, CURRENT INJURY, RIGHT KNEE, INITIAL ENCOUNTER: Primary | ICD-10-CM

## 2022-07-19 PROCEDURE — 97112 NEUROMUSCULAR REEDUCATION: CPT

## 2022-07-19 PROCEDURE — 97110 THERAPEUTIC EXERCISES: CPT

## 2022-07-19 NOTE — PROGRESS NOTES
Daily Note     Today's date: 2022  Patient name: Renate Zacarias  : 1970  MRN: [id number]  Referring provider: Guillermina Zaragoza*  Dx:   Encounter Diagnosis     ICD-10-CM    1  Other tear of medial meniscus, current injury, right knee, initial encounter  S83 241A        Start Time: 0800  Stop Time: 0845  Total time in clinic (min): 45 minutes    Subjective: Pt reports that she had a decreased pain level today, patient notes medial knee pain with her function       Objective: See treatment diary below      Assessment: Tolerated treatment well  Pt tolerated increases in resistance well today, patient challenged with overall intervention tolerance with added green theraband resistance today, patient challenged with proximal hip stability today with added motions to monster walks, patient educated to use ice at home and educated to monitor soreness with new added resistance, patient verbalized understanding  Patient would benefit from continued PT to maximize function and decrease knee pain  Plan: Continue per plan of care  Access Code: [email or web address]  URL: [email or web address]  Date: 2022  Prepared by:  Theola Peeling    Exercises  · Supine Heel Slide with Strap - 1 x daily - 7 x weekly - 10 reps - 10 hold  · Long Sitting Calf Stretch with Strap - 1 x daily - 7 x weekly - 3 reps - 20 hold  · Supine Straight Leg Raises - 1 x daily - 7 x weekly - 2 sets - 5 reps  · Prone Knee Flexion AROM - 1 x daily - 7 x weekly - 2 sets - 10 reps  · Prone Quadriceps Set - 1 x daily - 7 x weekly - 10 reps - 5 hold  · Heel rises with counter support - 1 x daily - 7 x weekly - 1 sets - 10 reps  · Toe Raises with Counter Support - 1 x daily - 7 x weekly - 1 sets - 10 reps  · Standing Marching - 1 x daily - 7 x weekly - 1 sets - 15 reps          Precautions:   Past Medical History:   Diagnosis Date    Asthma     Broken tooth     upper right-back    High cholesterol     Hypertension     Morbid obesity with BMI of 45 0-49 9, adult ([facility] [address] )     Pain in right knee     MRI on 6/7/22     POC exp 10/6/2022  DOS: 7/5/2022  SOC: 7/7/2022  FOTO: 7/7/2022  DAILY TREATMENT LOG    DATE 7/7/2022 7/11/2022 7/14/2022 7/19/2022    VISIT #  2 3  4    AUTH  2/12 3/12 4/12    AUTH DATE  10/7/22  10/7/2022    MANUALS                        Neuro Re-Ed  10' 10'  10'    Clamshells    3" 2x10  3" 2x10     Alt 2 cone taps  1x10 ea occas use of rail      SLS        Stand steamboats w/ TB  grn TB @ knees 2x5 ea w/ rail grn TB @ knees 2x5 ea w/ rail  grn TB @ knees 2x5 ea w/ rail     Monster walks    GTB knees 10'x3  GTB knees 10'x3; added side stepping and reverse monster walks                    Ther Ex  30' 35' 35'    bike  Full ROM 5' Full ROM 5'  Full ROM 5'    DF w/ SOS 20"x3  20"x3  20"x3     Heel slide w/ SOS 10x10" 10x10" 10x10"  10x10"     qset SLR 2x5 qset w/ Heel roll 5"x10; SLR 1x10 Qset w/ SLR 2x10 R/L  Qset w/ SLR 2x10 R/L; attempted 1 5 # weight, patient reported increased pain medially     Prone knee flexion 1x10 1x15      Stand B/L HR & TR 1x10 ea HR off step 1x10 HR off step 2x10 HR off step 2x10    Stand alt hip flexion 1x15       bridges  3" 2x10 3" 2x10  3" 2x10 , added adduction squeeze    TB hams  grn 2x10 grn 2x10  grn 2x10     LAQ w/ Add  3" 2x10 3" 2x10  3" 2x10     Supine leg press   B/L 30# 2x10   Uni 10# 2x10  B/L 30# 2x10   Uni 10# 2x10     Gait Training                        Modalities        CP to end R knee  5'

## 2022-07-21 ENCOUNTER — APPOINTMENT (OUTPATIENT)
Dept: PHYSICAL THERAPY | Facility: CLINIC | Age: 52
End: 2022-07-21
Payer: COMMERCIAL

## 2022-07-21 ENCOUNTER — OFFICE VISIT (OUTPATIENT)
Dept: SLEEP CENTER | Facility: CLINIC | Age: 52
End: 2022-07-21
Payer: COMMERCIAL

## 2022-07-21 ENCOUNTER — TELEPHONE (OUTPATIENT)
Dept: PHYSICAL THERAPY | Facility: CLINIC | Age: 52
End: 2022-07-21

## 2022-07-21 VITALS
WEIGHT: 293 LBS | HEART RATE: 80 BPM | SYSTOLIC BLOOD PRESSURE: 124 MMHG | BODY MASS INDEX: 47.09 KG/M2 | HEIGHT: 66 IN | DIASTOLIC BLOOD PRESSURE: 86 MMHG

## 2022-07-21 DIAGNOSIS — E11.9 NEW ONSET TYPE 2 DIABETES MELLITUS (HCC): ICD-10-CM

## 2022-07-21 DIAGNOSIS — Z12.11 SCREENING FOR COLON CANCER: Primary | ICD-10-CM

## 2022-07-21 DIAGNOSIS — G47.33 OSA (OBSTRUCTIVE SLEEP APNEA): Primary | ICD-10-CM

## 2022-07-21 DIAGNOSIS — Z98.84 BARIATRIC SURGERY STATUS: ICD-10-CM

## 2022-07-21 DIAGNOSIS — E66.01 MORBID OBESITY (HCC): ICD-10-CM

## 2022-07-21 DIAGNOSIS — I10 ESSENTIAL HYPERTENSION: ICD-10-CM

## 2022-07-21 PROCEDURE — 3079F DIAST BP 80-89 MM HG: CPT | Performed by: INTERNAL MEDICINE

## 2022-07-21 PROCEDURE — 3074F SYST BP LT 130 MM HG: CPT | Performed by: INTERNAL MEDICINE

## 2022-07-21 PROCEDURE — 99204 OFFICE O/P NEW MOD 45 MIN: CPT | Performed by: INTERNAL MEDICINE

## 2022-07-21 NOTE — PROGRESS NOTES
Review of Systems      Genitourinary none   Cardiology none   Gastrointestinal none   Neurology none   Constitutional excessive sweating at night   Integumentary none   Psychiatry depression   Musculoskeletal sciatica   Pulmonary snoring   ENT none   Endocrine none   Hematological none

## 2022-07-21 NOTE — PATIENT INSTRUCTIONS
What is BRAEDEN? Obstructive sleep apnea is a common and serious sleep disorder that causes you to stop breathing during sleep  The airway repeatedly becomes blocked, limiting the amount of air that reaches your lungs  When this happens, you may snore loudly or making choking noises as you try to breathe  Your brain and body becomes oxygen deprived and you may wake up  This may happen a few times a night, or in more severe cases, several hundred times a night  Sleep apnea can make you wake up in the morning feeling tired or unrefreshed even though you have had a full night of sleep  During the day, you may feel fatigued, have difficulty concentrating or you may even unintentionally fall asleep  This is because your body is waking up numerous times throughout the night, even though you might not be conscious of each awakening  The lack of oxygen your body receives can have negative long-term consequences for your health  This includes:  High blood pressure  Heart disease  Irregular heart rhythms  Stroke  Pre-diabetes and diabetes  Depression    Testing  An objective evaluation of your sleep may be needed before your board certified sleep physician can make a diagnosis  Options include:   In-lab overnight sleep study  This type of sleep study requires you to stay overnight at a sleep center, in a bed that may resemble a hotel room  You will sleep with sensors hooked up to various parts of your body  These sensors record your brain waves, heartbeat, breathing and movement  An overnight sleep study also provides your doctor with the most complete information about your sleep  Learn more about an overnight sleep study  Home sleep apnea test  Some patients with high risk factors for obstructive sleep apnea and no other medical disorders may be candidates for a home sleep apnea test  The testing equipment differs in that it is less complicated than what is used in an overnight sleep study   As such, does not give all the data an in-lab will and if negative, may not mean you do not have the problem  Treatment for sleep apnea  includes using a continuous positive airway pressure (CPAP) machine to keep your airway open during sleep  A mask is placed over your nose and mouth, or just your nose  The mask is hooked to the CPAP machine that blows a gentle stream of air into the mask when you breathe  This helps keep your airway open so you can breathe more regularly  Extra oxygen may be given to you through the machine  You may be given a mouth device  It looks like a mouth guard or dental retainer and stops your tongue and mouth tissues from blocking your throat while you sleep  Surgery may be needed to remove extra tissues that block your mouth, throat, or nose  Manage sleep apnea:   Do not smoke  Nicotine and other chemicals in cigarettes and cigars can cause lung damage  Ask your healthcare provider for information if you currently smoke and need help to quit  E-cigarettes or smokeless tobacco still contain nicotine  Talk to your healthcare provider before you use these products  Do not drink alcohol or take sedative medicine before you go to sleep  Alcohol and sedatives can relax the muscles and tissues around your throat  This can block the airflow to your lungs  Maintain a healthy weight  Excess tissue around your throat may restrict your breathing  Ask your healthcare provider for information if you need to lose weight  Sleep on your side or use pillows designed to prevent sleep apnea  This prevents your tongue or other tissues from blocking your throat  You can also raise the head of your bed  Driving Safety  Refrain from driving when drowsy  Follow up with your healthcare provider as directed:  Write down your questions so you remember to ask them during your visits  Go to AASM website for more information: Sleepeducation  org     What is BRAEDEN?    Obstructive sleep apnea is a common and serious sleep disorder that causes you to stop breathing during sleep  The airway repeatedly becomes blocked, limiting the amount of air that reaches your lungs  When this happens, you may snore loudly or making choking noises as you try to breathe  Your brain and body becomes oxygen deprived and you may wake up  This may happen a few times a night, or in more severe cases, several hundred times a night  Sleep apnea can make you wake up in the morning feeling tired or unrefreshed even though you have had a full night of sleep  During the day, you may feel fatigued, have difficulty concentrating or you may even unintentionally fall asleep  This is because your body is waking up numerous times throughout the night, even though you might not be conscious of each awakening  The lack of oxygen your body receives can have negative long-term consequences for your health  This includes:  High blood pressure  Heart disease  Irregular heart rhythms  Stroke  Pre-diabetes and diabetes  Depression    Testing  An objective evaluation of your sleep may be needed before your board certified sleep physician can make a diagnosis  Options include:   In-lab overnight sleep study  This type of sleep study requires you to stay overnight at a sleep center, in a bed that may resemble a hotel room  You will sleep with sensors hooked up to various parts of your body  These sensors record your brain waves, heartbeat, breathing and movement  An overnight sleep study also provides your doctor with the most complete information about your sleep  Learn more about an overnight sleep study  Home sleep apnea test  Some patients with high risk factors for obstructive sleep apnea and no other medical disorders may be candidates for a home sleep apnea test  The testing equipment differs in that it is less complicated than what is used in an overnight sleep study   As such, does not give all the data an in-lab will and if negative, may not mean you do not have the problem  Treatment for sleep apnea  includes using a continuous positive airway pressure (CPAP) machine to keep your airway open during sleep  A mask is placed over your nose and mouth, or just your nose  The mask is hooked to the CPAP machine that blows a gentle stream of air into the mask when you breathe  This helps keep your airway open so you can breathe more regularly  Extra oxygen may be given to you through the machine  You may be given a mouth device  It looks like a mouth guard or dental retainer and stops your tongue and mouth tissues from blocking your throat while you sleep  Surgery may be needed to remove extra tissues that block your mouth, throat, or nose  Manage sleep apnea:   Do not smoke  Nicotine and other chemicals in cigarettes and cigars can cause lung damage  Ask your healthcare provider for information if you currently smoke and need help to quit  E-cigarettes or smokeless tobacco still contain nicotine  Talk to your healthcare provider before you use these products  Do not drink alcohol or take sedative medicine before you go to sleep  Alcohol and sedatives can relax the muscles and tissues around your throat  This can block the airflow to your lungs  Maintain a healthy weight  Excess tissue around your throat may restrict your breathing  Ask your healthcare provider for information if you need to lose weight  Sleep on your side or use pillows designed to prevent sleep apnea  This prevents your tongue or other tissues from blocking your throat  You can also raise the head of your bed  Driving Safety  Refrain from driving when drowsy  Follow up with your healthcare provider as directed:  Write down your questions so you remember to ask them during your visits  Go to AAS website for more information: Sleepeducation  org       Nursing Support:  When: Monday through Friday 7A-5PM except holidays  Where: Our direct line is [phone]      If you are having a true emergency please call 911  In the event that the line is busy or it is after hours please leave a voice message and we will return your call  Please speak clearly, leaving your full name, birth date, best number to reach you and the reason for your call  Medication refills: We will need the name of the medication, the dosage, the ordering provider, whether you get a 30 or 90 day refill, and the pharmacy name and address  Medications will be ordered by the provider only  Nurses cannot call in prescriptions  Please allow 7 days for medication refills  Physician requested updates: If your provider requested that you call with an update after starting medication, please be ready to provide us the medication and dosage, what time you take your medication, the time you attempt to fall asleep, time you fall asleep, when you wake up, and what time you get out of bed  Sleep Study Results: We will contact you with sleep study results and/or next steps after the physician has reviewed your testing

## 2022-07-21 NOTE — TELEPHONE ENCOUNTER
Patient called to cancel today's appointment due to awakening with a stomach issue  Patient planning to attend next scheduled session       Hammad Louis, PT, MS, NCS  [facility] Neurologic Certified Specialist  [place] Licence #[id number]

## 2022-07-21 NOTE — PROGRESS NOTES
Consultation - Sleep Center   Kaitlin Ahuja  45 y o  female  :1970  DBV:[id number]  DOS:2022    Physician Requesting Consult: Kori Fitzpatrick MD             Reason for Consult : At your kind request I saw Kaitlin Ahuja for initial sleep evaluation today  The patient is here to evaluate for suspected Obstructive Sleep Apnea  PFSH, Problem List, Medications & Allergies were reviewed in EMR  Ila Sanchez  has a past medical history of Asthma, Broken tooth, High cholesterol, Hypertension, Morbid obesity with BMI of 45 0-49 9, adult ([address] ), and Pain in right knee  She has a current medication list which includes the following prescription(s): ibuprofen, lisinopril, metformin, metoprolol succinate, rosuvastatin, vitamin e (tocopherol), amlodipine, and aspirin  HPI:  Presently she sleeps alone but in the past has been told that she snores  She is not aware of snoring or breathing difficulties during sleep  She is satisfied with sleep and daytime function  Other Complaints: none  Restless Leg Syndrome: reports no suggestive symptoms  Parasomnia: reports teeth grinding during sleep in the past;   Sleep Routine (on average):   Typical Bedtime:  9:30 p m  Gets OOB:  5:30 a m  TIB:8 hrs  Sleep latency: upto 60 minutes while reading Sleep Interruptions:infrequent/nite because of nocturia and able to fall back asleep  Awakens: needing an alarm  Upon awakening: usually feels refreshed   She estimates getting 7 hrs sleep  Ila Sanchez denies Excessive Daytime Sleepiness  or napping   She rated herself at Total score: 3 /24 on the [place] Sleepiness Scale  Habits:  reports that she has never smoked  She has never used smokeless tobacco  ;   E-Cigarette/Vaping    E-Cigarette Use Never User     ;  reports no history of drug use ;  reports current alcohol use  ; Caffeine use:none ; Exercise routine: none         Family History: Negative for sleep disturbance  ROS - reviewed and as attached  Significant for weight has been stable  She reported no nasal, respiratory or cardiac symptoms  EXAM:  /86   Pulse 80   Ht 5' 6" (1 676 m)   Wt (!) 142 kg (312 lb 9 6 oz)   BMI 50 45 kg/m²    General: Well groomed female, well appearing, in no apparent distress  Neurological: Alert and orientated;  cooperative; Cranial nerves intact;    Psychiatric: Speech:clear and coherent;  Normal mood, affect & thought   Skin: warm and dry; Color& Hydration good; no facial rashes or lesions   HEENT:  Craniofacial anatomy: normal Sinuses: non- tender  TMJ: Normal    Eyes: EOM's intact;  conjunctiva/corneas clear   Ears: Externallyappear normal     Nasal Airway: narrow nares Septum:intact; Mucosa: normal; Turbinates: normal; Rhinorrhea: None   Mouth: Lips: normal posture; Dentition: normal   Mucosa:moist  ; Hard Palate:narrow   Oropharryx: crowded and AP narrowing Tongue: Mallampati:Class IV, Mobile and ScallopedSoft Palate:  redundant  Tonsils: absent  Neck:is thick and excess fatty tissue; Neck Circumference: 17 "; Supple; no abnormal masses; Thyroid:normal  Trachea:central     Lymph: No Cervical or Submandibular Lymhadenopathy  Heart: S1,S2 normal; RRR; no gallop; no murmurs   Lungs: Respiratory Effort:normal  Air entry good bilaterally  No wheezes  No rales  Abdomen: Obese, Soft & non-tender    Extremities: No pedal edema  No clubbing or cyanosis  Musculoskeletal:  Motor normal; Gait:normal        Recent CMP demonstrates marginally elevated CO2 at 27 millimoles per L and fasting glucose at 174 mg%; hemoglobin A1c was 8 mg%; CBC was normal    IMPRESSION: Primary/Secondary Sleep Diagnoses (to Medical or Psych conditions) & Comorbidities   1  BRAEDEN (obstructive sleep apnea)  Home Study   2  Essential hypertension     3  Morbid obesity ([address] )     4  New onset type 2 diabetes mellitus ([address] )     5   Bariatric surgery status  Ambulatory referral to Sleep Medicine PLAN:   With respect to above conditions, comprehensive counseling provided on pathophysiology; effects on symptoms and comorbidities, diagnostic strategies & limitations; treatment options; risks or no treament; risks & benefits of the various therapeutic options; costs and insurance aspects  I advised weight reduction, avoiding sleeping supine, using alcohol or sedating medications close to bed time and on safe driving practices  Nocturnal polysomnography is indicated and a diagnostic study will be scheduled  Patient is willing to try Positive airway pressure therapy and will be scheduled for a titration study if indicated  Follow-up to be scheduled after the studies to review results, further details of treatment options and to initiate/adjust therapy  Sincerely,     Authenticated electronically by Anton Santoro MD   on 67/88/37   Board Certified Specialist     Portions of the record may have been created with voice recognition software  Occasional wrong word or "sound a like" substitutions may have occurred due to the inherent limitations of voice recognition software  There may also be notations and random deletions of words or characters from malfunctioning software  Read the chart carefully and recognize, using context, where substitutions/deletions have occurred

## 2022-07-25 ENCOUNTER — APPOINTMENT (OUTPATIENT)
Dept: PHYSICAL THERAPY | Facility: CLINIC | Age: 52
End: 2022-07-25
Payer: COMMERCIAL

## 2022-07-26 ENCOUNTER — OFFICE VISIT (OUTPATIENT)
Dept: PHYSICAL THERAPY | Facility: CLINIC | Age: 52
End: 2022-07-26
Payer: COMMERCIAL

## 2022-07-26 DIAGNOSIS — S83.241A OTHER TEAR OF MEDIAL MENISCUS, CURRENT INJURY, RIGHT KNEE, INITIAL ENCOUNTER: Primary | ICD-10-CM

## 2022-07-26 PROCEDURE — 97110 THERAPEUTIC EXERCISES: CPT

## 2022-07-26 PROCEDURE — 97112 NEUROMUSCULAR REEDUCATION: CPT

## 2022-07-26 NOTE — PROGRESS NOTES
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, she has not tried this yet but images it will be tough for her  Objective: See treatment diary below      Assessment: Tolerated treatment well  Pt challenged with additional resistance added today, no noted increases in knee pain during or post session  Cont to progress quad and posterolateral hip strengthening  Patient would benefit from continued PT      Plan: Continue per plan of care  Access Code: [email or web address]  URL: [email or web address]  Date: 2022  Prepared by:  Chadwick Barcenas    Exercises  · Supine Heel Slide with Strap - 1 x daily - 7 x weekly - 10 reps - 10 hold  · Long Sitting Calf Stretch with Strap - 1 x daily - 7 x weekly - 3 reps - 20 hold  · Supine Straight Leg Raises - 1 x daily - 7 x weekly - 2 sets - 5 reps  · Prone Knee Flexion AROM - 1 x daily - 7 x weekly - 2 sets - 10 reps  · Prone Quadriceps Set - 1 x daily - 7 x weekly - 10 reps - 5 hold  · Heel rises with counter support - 1 x daily - 7 x weekly - 1 sets - 10 reps  · Toe Raises with Counter Support - 1 x daily - 7 x weekly - 1 sets - 10 reps  · Standing Marching - 1 x daily - 7 x weekly - 1 sets - 15 reps          Precautions:   Past Medical History:   Diagnosis Date    Asthma     Broken tooth     upper right-back    High cholesterol     Hypertension     Morbid obesity with BMI of 45 0-49 9, adult (HCC)     Pain in right knee     MRI on 22     POC exp 10/6/2022  DOS: 7/5/2022  SOC: 7/7/2022  FOTO: 7/7/2022  DAILY TREATMENT LOG    DATE 7/7/2022 7/11/2022 7/14/2022 7/19/2022 7/26/2022   VISIT #  2 3  4 5 FOTO    AUTH  2/12 3/12 4/12 5/12   AUTH DATE  10/7/22  10/7/2022    MANUALS                        Neuro Re-Ed  10' 10'  10'    Clamshells    3" 2x10  3" 2x10  YTB  2x10     Alt 2 cone taps  1x10 ea occas use of rail      SLS        Stand steamboats w/ TB  grn TB @ knees 2x5 ea w/ rail grn TB @ knees 2x5 ea w/ rail  grn TB @ knees 2x5 ea w/ rail  grn TB @ knees 2x5 ea w/ rail    Monster walks    GTB knees 10'x3  GTB knees 10'x3; added side stepping and reverse monster walks Fwd/bwd GTB   15'x2    Side stepping w/ TB      GTB 15'x2           Ther Ex  30' 35' 35'    bike  Full ROM 5' Full ROM 5'  Full ROM 5' Full ROM 5'    DF w/ SOS 20"x3  20"x3  20"x3     Heel slide w/ SOS 10x10" 10x10" 10x10"  10x10"  Prone quad stretch 20"x4 R   qset SLR 2x5 qset w/ Heel roll 5"x10; SLR 1x10 Qset w/ SLR 2x10 R/L  Qset w/ SLR 2x10 R/L; attempted 1 5 # weight, patient reported increased pain medially  qset w/ SLR flex/abd 2x10 ea   Prone knee flexion 1x10 1x15      Prone quad set      5"x15    Stand B/L HR & TR 1x10 ea HR off step 1x10 HR off step 2x10 HR off step 2x10    Stand alt hip flexion 1x15       bridges  3" 2x10 3" 2x10  3" 2x10 , added adduction squeeze W/ add 3" 2x10    TB hams  grn 2x10 grn 2x10  grn 2x10     LAQ w/ Add  3" 2x10 3" 2x10  3" 2x10  5" 2x10     Supine leg press   B/L 30# 2x10   Uni 10# 2x10  B/L 30# 2x10   Uni 10# 2x10  B/L 45# 2x10   Uni 20# 2x10     Gait Training                        Modalities        CP to end R knee  5'

## 2022-07-28 ENCOUNTER — OFFICE VISIT (OUTPATIENT)
Dept: PHYSICAL THERAPY | Facility: CLINIC | Age: 52
End: 2022-07-28
Payer: COMMERCIAL

## 2022-07-28 ENCOUNTER — TELEPHONE (OUTPATIENT)
Dept: BARIATRICS | Facility: CLINIC | Age: 52
End: 2022-07-28

## 2022-07-28 DIAGNOSIS — R42 VERTIGO: Primary | ICD-10-CM

## 2022-07-28 PROCEDURE — 97112 NEUROMUSCULAR REEDUCATION: CPT | Performed by: PHYSICAL THERAPIST

## 2022-07-28 PROCEDURE — 97164 PT RE-EVAL EST PLAN CARE: CPT | Performed by: PHYSICAL THERAPIST

## 2022-07-28 NOTE — TELEPHONE ENCOUNTER
Pt cancelled her appt with Dr Jarvis for her plus 1 weight check and re-scheduled for 8/3/22  Mike Sesay stated pt must be seen in the month of July  I left a vcmail for the pt to call us back

## 2022-07-28 NOTE — PROGRESS NOTES
PT Evaluation     Today's date: 2022  Patient name: Didier Francisco  : 1970  MRN: [id number]  Referring provider: Self, Referral  Dx:   Encounter Diagnosis     ICD-10-CM    1  Vertigo  R42                   Assessment  Assessment details: 2022: Didier Francisco is a 45 y o  female who presents with for Direct Access evaluation of severe dizziness w/ nystagmus w/ positional changes  She is a current pt referred by Dr Ratna Linn for her knee post menisectomy who reports sudden onset of vertigo yesterday morning  Due to these impairments, patient has difficulty performing ADL's, recreational activities, work-related activities, engaging in social activities, lifting/carrying, transfers, reaching  Patient's clinical presentation is consistent with their referring diagnosis of Vertigo  (primary encounter diagnosis)  She has very minimal symptoms w/ oculomotor testing, but presents w/ positive Ismael Halpike test of the Right side posterior canal  Pt was taken through the Epley maneuver twice, the first time positive for BPPV/nystagmus and c/o dizziness, the second time being negative  Patient has been educated in anatomy and mechanical etiology of her symptoms, avoidance of severe head movements for a few days an plan of care which is just to follow up for her next scheduled appointment for her knee on 2022  She was advised to call sooner if symptoms return or persist  Patient would benefit from skilled physical therapy services 1-3 more visits to address their aforementioned functional limitations and progress towards prior level of function and independence with home exercise program if needed  Impairments: abnormal or restricted ROM, activity intolerance, impaired physical strength, lacks appropriate home exercise program and pain with function  Understanding of Dx/Px/POC: good   Prognosis: good    Goals  Short Term Goals: Target Date 8/3/2022  1   Pt to report at least 50% reduction in symptoms of dizziness w/ positional changes  2  Pt to be indep w/ HEP for VRT as needed      Long Term Goals: Target Date 8/25/2022  1  Indep with HEP if needed for VRT  2  Pt to report 0/10 remaining dizziness w/ positional changes  3  Pt to score 0/100 on DHI  Plan  Patient would benefit from: skilled PT and PT eval  Planned therapy interventions: patient education, home exercise program, neuromuscular re-education and activity modification  Frequency: 4x week (1-2/week)  Plan of Care beginning date: 7/28/2022  Plan of Care expiration date: 8/25/2022  Treatment plan discussed with: patient        Subjective Evaluation    History of Present Illness  Mechanism of injury: Subjective: 7/28/2022 Pt has attended 5 visits for her knee post menisectomy  She had an appt today for her knee, but reports she woke w/ severe vertigo yesterday and again this morning  She first noticed a little dizziness transferring off the plinth from supine during PT 7/26/2022, but it was brief and not severe  The past 24 hours have been severe dizziness w/ positional changes, getting OOB, bending over or turning in bed  She has no pain associated with her dizziness, but does report nausea  She denies HA, vomiting or tinnitus  No recent illness  She rates her dizziness at worst at 10/10    Treatments  Current treatment: physical therapy  Patient Goals  Patient goal: abolish dizziness w/ positonal changes        Objective      Cervical/Thoracic:  MVBI - (-) Bilaterally 30 sec  Sharp Leyla - (-)  Alar Ligament - (-)    Cervical AROM limitation:  Flexion - nil michele  Extension - min michele 4/10 dizziness  R rotation - nil michele  L rotation - nil michele  R Lat flexion - min michele  L lat flexion - min michele  Retraction - min michele      Oculomotor Screen: baseline 0/10  Smooth Pursuits (vert/horiz/diag/J) - 0/10  Near Point Convergence - 0/10 5 cm (4 cm= normal)  Saccades 10x each - Horizontal - 0/10  Saccades 10x each- Vertical - 0/10  VOR screen - 2/10 dizziness/nausea/fogginess/HA  VOR cancel /VMS- 4/10 dizziness/nausea/fogginess/HA  Head Thrust (ipsilateral)- 0/10 dizziness/nausea/fogginess/HA; no corrective saccades    mCTSIB  - FTEO (firm) - 60+ sec  - FTEC (firm) - 60+ sec  - FTEO (foam) - 60+ sec  - FTEC (foam) - unable    Fukuda step test:  7/28/2022 - (-)    DHI - 46/100 (0-30 =mild; 31-60 = mod;  = severe; >60= fall risk)    Headache Frequency - none; meds NA    Mechanical Assessment:  7/28/2022 - DixHalpike (-) L; (+) R w/ upbeating to the left lasting 25 sec  Flowsheet Rows    Flowsheet Row Most Recent Value   PT/OT G-Codes    Current Score 50   Projected Score 0             Precautions: R knee menisectomy   Past Medical History:   Diagnosis Date    Asthma     Broken tooth     upper right-back    High cholesterol     Hypertension     Morbid obesity with BMI of 45 0-49 9, adult (HCC)     Pain in right knee     MRI on 6/7/22     Access Code: [email or web address]  URL: [email or web address]  Date: 07/07/2022  Prepared by:  Manny Anmol    Exercises  · Supine Heel Slide with Strap - 1 x daily - 7 x weekly - 10 reps - 10 hold  · Long Sitting Calf Stretch with Strap - 1 x daily - 7 x weekly - 3 reps - 20 hold  · Supine Straight Leg Raises - 1 x daily - 7 x weekly - 2 sets - 5 reps  · Prone Knee Flexion AROM - 1 x daily - 7 x weekly - 2 sets - 10 reps  · Prone Quadriceps Set - 1 x daily - 7 x weekly - 10 reps - 5 hold  · Heel rises with counter support - 1 x daily - 7 x weekly - 1 sets - 10 reps  · Toe Raises with Counter Support - 1 x daily - 7 x weekly - 1 sets - 10 reps  · Standing Marching - 1 x daily - 7 x weekly - 1 sets - 15 reps        POC exp 10/6/2022 knee; 8/25/2022 vertigo  DOS: 7/5/2022  SOC: 7/7/2022 R knee; 7/28/2022 vertigo  FOTO: 7/26/2022 R knee; 7/28/2022 vertigo  DAILY TREATMENT LOG    DATE 7/28/2022  FOTO vert  Eval vert  7/14/2022 7/19/2022 7/26/2022   VISIT # 6  3  4 5 FOTO    AUTH   3/12 4/12 5/12   AUTH DATE 10/7/2022    MANUALS                Evaluation Vertigo TT 25'       Neuro Re-Ed 20'  10'  10'    CRT R side Epley for post canal 2x w/ 5' break in btwn       Pt educ Krista & mech cause of vertigo; avoid excessive head mvts until next visit       Clamshells    3" 2x10  3" 2x10  YTB  2x10     Alt 2 cone taps        SLS        Stand steamboats w/ TB   grn TB @ knees 2x5 ea w/ rail  grn TB @ knees 2x5 ea w/ rail  grn TB @ knees 2x5 ea w/ rail    Monster walks    GTB knees 10'x3  GTB knees 10'x3; added side stepping and reverse monster walks Fwd/bwd GTB   15'x2    Side stepping w/ TB      GTB 15'x2           Ther Ex   35' 35'    bike   Full ROM 5'  Full ROM 5' Full ROM 5'    DF w/ SOS   20"x3  20"x3     Heel slide w/ SOS   10x10"  10x10"  Prone quad stretch 20"x4 R   qset SLR   Qset w/ SLR 2x10 R/L  Qset w/ SLR 2x10 R/L; attempted 1 5 # weight, patient reported increased pain medially  qset w/ SLR flex/abd 2x10 ea   Prone knee flexion        Prone quad set      5"x15    Stand B/L HR & TR   HR off step 2x10 HR off step 2x10    Stand alt hip flexion        bridges   3" 2x10  3" 2x10 , added adduction squeeze W/ add 3" 2x10    TB hams   grn 2x10  grn 2x10     LAQ w/ Add   3" 2x10  3" 2x10  5" 2x10     Supine leg press   B/L 30# 2x10   Uni 10# 2x10  B/L 30# 2x10   Uni 10# 2x10  B/L 45# 2x10   Uni 20# 2x10     Gait Training                        Modalities        CP to end R knee

## 2022-07-29 ENCOUNTER — OFFICE VISIT (OUTPATIENT)
Dept: BARIATRICS | Facility: CLINIC | Age: 52
End: 2022-07-29

## 2022-07-29 VITALS — HEIGHT: 66 IN | BODY MASS INDEX: 47.09 KG/M2 | WEIGHT: 293 LBS

## 2022-07-29 DIAGNOSIS — E66.01 MORBID (SEVERE) OBESITY DUE TO EXCESS CALORIES (HCC): Primary | ICD-10-CM

## 2022-07-29 PROCEDURE — RECHECK

## 2022-07-29 NOTE — PROGRESS NOTES
Bariatric Nutrition Assessment Note    Type of surgery    Preop (3+1 weight checks)--3 of 3 wt check completed, but still needs +1 with surgeon--likely will schedule in sept  Surgery Date: TBD  Surgeon: Dr Tiera Chacon on 22    Nutrition Assessment   Scottie Claudia  45 y o   female     Wt with BMI of 25: 159 3#  Pre-Op Excess Wt: 149 7#  Height 5' 6" (1 676 m), weight (!) 141 kg (311 lb 12 8 oz)  Body mass index is 50 33 kg/m²  Weight change:  -2lbs  Net weight change:  +2lbs      Weight History   Onset of Obesity: Childhood, was over weight during high school and did what she needed to do keep the weight down  Once got  and pregnant gained th weight   Family history of obesity: No  Wt Loss Attempts: Commercial Programs (C[employer], Anna Eldridge, etc )  Counseling with  MD--meds  Exercise  High Protein/Low CHO diets (Atkins, Union, etc )  Meal Replacements (Roxine Candida Fast, etc )  Nutrition Counseling with RD  Self Created Diets (Portion Control, Healthy Food Choices, etc )  [facility] 1 month    Maximum Wt Lost: 80lbs when was 26yrs old and did Phen Phen    Review of History and Medications   Past Medical History:   Diagnosis Date    Asthma     Broken tooth     upper right-back    High cholesterol     Hypertension     Morbid obesity with BMI of 45 0-49 9, adult ([address] )     Pain in right knee     MRI on 22     Past Surgical History:   Procedure Laterality Date     SECTION      EGD  2022    food in the stomach-repeat EGD on 22    LAPAROSCOPY      PA KNEE SCOPE,MED/LAT MENISECTOMY Right 2022    Procedure: KNEE ARTHROSCOPY  [address];  Surgeon: Kevin Johnson MD;  [facility];  Service: Orthopedics    TONSILLECTOMY      WISDOM TOOTH EXTRACTION       Social History     Socioeconomic History    Marital status:       Spouse name: Not on file    Number of children: Not on file    Years of education: Not on file    Highest education level: Not on file   Occupational History    Not on file   Tobacco Use    Smoking status: Never Smoker    Smokeless tobacco: Never Used   Vaping Use    Vaping Use: Never used   Substance and Sexual Activity    Alcohol use: Yes     Comment: rare    Drug use: Never    Sexual activity: Not on file   Other Topics Concern    Not on file   Social History Narrative    Not on file     Social Determinants of Health     Financial Resource Strain: Not on file   Food Insecurity: Not on file   Transportation Needs: Not on file   Physical Activity: Not on file   Stress: Not on file   Social Connections: Not on file   Intimate Partner Violence: Not on file   Housing Stability: Not on file       Current Outpatient Medications:     amLODIPine (NORVASC) 5 mg tablet, Take 1 tablet (5 mg total) by mouth daily, Disp: 90 tablet, Rfl: 0    aspirin (ECOTRIN) 325 mg EC tablet, Take 1 tablet (325 mg total) by mouth daily, Disp: 21 tablet, Rfl: 0    ibuprofen (MOTRIN) 200 mg tablet, Take 600 mg by mouth every 6 (six) hours as needed for mild pain Last dose 6/28/22, Disp: , Rfl:     lisinopril (ZESTRIL) 10 mg tablet, Take 1 tablet (10 mg total) by mouth daily (Patient taking differently: Take 10 mg by mouth every morning), Disp: 90 tablet, Rfl: 1    metFORMIN (GLUCOPHAGE) 500 mg tablet, Take 1 tablet (500 mg total) by mouth daily with breakfast, Disp: 90 tablet, Rfl: 1    metoprolol succinate (TOPROL-XL) 50 mg 24 hr tablet, Take 1 tablet (50 mg total) by mouth daily, Disp: 90 tablet, Rfl: 0    rosuvastatin (CRESTOR) 40 MG tablet, Take 1 tablet (40 mg total) by mouth daily, Disp: 90 tablet, Rfl: 1    vitamin E, tocopherol, 400 units capsule, Take 400 Units by mouth daily Last dose 6/27/22, Disp: , Rfl:     Food Intake and Lifestyle Assessment   Food Intake Assessment completed via usual diet recall  Wake: 6am (ex-teacher)    Has had a lot going on--daughter moving off campus at college in [facility] [employer], taking care of mom and many PT appts    Breakfast: 9:30am--[employer] shake  Snack: -   Lunch: not big on lunch--pt will do another protein shake or a deli meat and cheese roll-up  Snack: 3-4pm:  Cheese, fruit  Dinner: 4pm yeaterday had an early dinner--harvest mix with veggies and corn  6pm- (larger portions)-doing a lot of salmon or chicken on the grill with veggies and corn   s  Snack: cookies or chips and dip or salsa--grazes--encouraged to measure    Beverage intake: water, sugar free beverages, diet soda and alcohol  Protein supplement: Tried premier and [employer] been doing the Kastja lately  Estimated protein intake per day: 60gm  Estimated fluid intake per day: 64oz non-bibiana fluids, occasional alcohol  Meals eaten away from home: doing a lot more take out recently since lost  in 2020 and daughter in college--sushi w/noodles OR chicken parm sandwich, etc  Typical meal pattern: 2 meals per day and grazes on snacks per day  Eating Behaviors: Consumption of high calorie/ high fat foods, Large portion sizes, Frequent snacking/ grazing, Mindless eating, Emotional eating, Craves sweet foods and Craves salty foods  Food allergies or intolerances: No Known Allergies  Cultural or Latter-day considerations: none    Physical Assessment  Physical Activity  Types of exercise: No structured exercise  Restoring a old home so that is her activity  Current physical limitations: none    Psychosocial Assessment   Support systems: friend(s) relative(s) children  Socioeconomic factors: none    Nutrition Diagnosis  Diagnosis: Overweight / Obesity (NC-3 3)  Related to: Physical inactivity and Excessive energy intake  As Evidenced by: BMI >25     Nutrition Prescription: Recommend the following diet  Regular    Interventions and Teaching   Discussed pre-op and post-op nutrition guidelines  Patient educated and handouts provided    Surgical changes to stomach / GI  Capacity of post-surgery stomach  Diet progression  Adequate hydration  Expected weight loss  Weight loss plateaus/ possibility of weight regain  Exercise  Nutrition considerations after surgery  Protein supplements  Meal planning and preparation  Appropriate carbohydrate, protein, and fat intake, and food/fluid choices to maximize safe weight loss, nutrient intake, and tolerance   Dietary and lifestyle changes  Possible problems with poor eating habits  Intuitive eating  Techniques for self monitoring and keeping daily food journal  Vitamin / Mineral supplementation of Multivitamin with minerals and Vitamin D    Education provided to: patient    Barriers to learning: No barriers identified  Readiness to change: preparation    Prior research on procedure: books and internet    Comprehension: verbalizes understanding     Expected Compliance: good    Recommendations  Pt is an appropriate candidate for surgery  Yes    Evaluation / Monitoring  Dietitian to Monitor: Eating pattern as discussed Tolerance of nutrition prescription Body weight Lab values Physical activity    Pre-op wt loss:  Do not gain  Lose 5% by DOS:  -15lbs (294#)    Workflow:  [place] [facility] and/or D+A Clearance: n/a   PCP Letter: completed    Support Group: incompleted   Surgeon Appt  Dr Hampton Schilder on 4/29/22   EGD scheduled: completed on 6/3   Cardiac Risk Assessment: was going to go today, will reschedule   Sleep Studies consult was on 7/21/22, study for Dec as of now   Blood work: completed   Nicotine test n/a   Pre-Operative Program: 3 of 3 completed + 1 to be scheduled for Sept    Pt present today 2lbs weight loss in the past month, but overall a 2lb weight gain from start weight  Discussed with pt the need to be below her start weight by the time we can submit for auth to insurance  Pt verbalizes understanding  Hasn't made too many changes, now just has a protein shake for breakfast, but is often skipping lunch, grazing on snack, and consuming larger portions    She reports she has a lot going on with her daughter moving, helping take care of her mom who has Alz and going to PT appts for her knee  Discussed 2 goal--64oz non-bibiana fluids and meal schedule with 2 shakes per day, sensible dinner, and 2 portion controlled snacks (provided w/snack list)      Goals  · Food journal via [employer]  · Complete lesson plans 1-6  · Use protein shake for 1-2 meals per day  · Eliminate mindless snacking --do portion controled snack from list provided  · Work on 30/60 rule  · F/U with SW in one month  · Likely should be able to schedule +1 insurance visit with surgeons in Sept if sleep study can/was moved up from Dec      Time Spent:   30 mins

## 2022-08-02 ENCOUNTER — EVALUATION (OUTPATIENT)
Dept: PHYSICAL THERAPY | Facility: CLINIC | Age: 52
End: 2022-08-02
Payer: COMMERCIAL

## 2022-08-02 DIAGNOSIS — R42 VERTIGO: ICD-10-CM

## 2022-08-02 DIAGNOSIS — S83.241A OTHER TEAR OF MEDIAL MENISCUS, CURRENT INJURY, RIGHT KNEE, INITIAL ENCOUNTER: Primary | ICD-10-CM

## 2022-08-02 PROCEDURE — 97112 NEUROMUSCULAR REEDUCATION: CPT | Performed by: PHYSICAL THERAPIST

## 2022-08-02 PROCEDURE — 97110 THERAPEUTIC EXERCISES: CPT | Performed by: PHYSICAL THERAPIST

## 2022-08-02 NOTE — PROGRESS NOTES
PT Re-Evaluation  and PT Discharge   RE vertigo/DC knee    Today's date: 2022  Patient name: Renate Zacarias  : 1970  MRN: [id number]  Referring provider: Guillermina Zaragoza*  Dx:   Encounter Diagnosis     ICD-10-CM    1  Vertigo  R42    2  Other tear of medial meniscus, current injury, right knee, initial encounter  [id number]                   Assessment  Assessment details: 2022: Pt has attended 7 visits and states she is ready to D/C PT for her R knee  Her ROM, MMT and balance are WNL  She notes no functional limitations and has mild discomfort w/ deep squatting  Pt reports a return of mild symptoms of dizziness after swimming a few days ago  She presents w/ positive DixHalpike test today indicative of R posterior canal BPPV with shorter duration symptoms & nystagmus vs last week  After Epley maneuver, her DixHalpike test was negative  We will keep her on hold regarding her vertigo and pt was advised to call us in a week to follow up for status check  2022: Renate Zacarias is a 45 y o  female who presents with for Direct Access evaluation of severe dizziness w/ nystagmus w/ positional changes  She is a current pt referred by Dr David Blas for her knee post menisectomy who reports sudden onset of vertigo yesterday morning  Due to these impairments, patient has difficulty performing ADL's, recreational activities, work-related activities, engaging in social activities, lifting/carrying, transfers, reaching  Patient's clinical presentation is consistent with their referring diagnosis of Vertigo  (primary encounter diagnosis)  She has very minimal symptoms w/ oculomotor testing, but presents w/ positive Ismael Halpike test of the Right side posterior canal  Pt was taken through the Epley maneuver twice, the first time positive for BPPV/nystagmus and c/o dizziness, the second time being negative    Patient has been educated in anatomy and mechanical etiology of her symptoms, avoidance of severe head movements for a few days an plan of care which is just to follow up for her next scheduled appointment for her knee on 8/2/2022  She was advised to call sooner if symptoms return or persist  Patient would benefit from skilled physical therapy services 1-3 more visits to address their aforementioned functional limitations and progress towards prior level of function and independence with home exercise program if needed  7/7/2022: Josh Mederos is a 45 y o  female who presents with pain, decreased strength, decreased ROM, decreased joint mobility, joint effusion and significant OA  Due to these impairments, patient has difficulty performing ADL's, recreational activities, engaging in social activities, ambulation, stair negotiation, lifting/carrying  Patient's clinical presentation is consistent with their referring diagnosis of Other tear of medial meniscus, current injury, right knee, initial encounter  Plan: Ambulatory Referral to Physical Therapy  Patient has been educated in post-op contraindications / precautions, wound care, gait training w/ SPC, proper use of CP, weight bearing status (WBAT, but limit squatting, stairs, foot planted twisting), home exercise program and plan of care  Patient would benefit from skilled physical therapy services to address their aforementioned functional limitations and progress towards prior level of function and independence with home exercise program      Other impairment: positive DixHalpike  Understanding of Dx/Px/POC: good   Prognosis: good    Goals  Short Term Goals: Target Date 4 weeks (8/5/2022) - met all STG's  1  Initiate and advance HEP  2 Improve AROM R knee to be equal to L w/o pain to prepare for reciprocal stairs w/o c/o   3  Improve R LE strength to 5/5 to prepare for functional LTG  4  Establish indep ambulation all surfaces w/o AD  Long Term Goals: Target Date 12 weeks (10/6/2022) - met all LTG's  1   Indep with HEP  2  Pt to tolerate reciprocal stairs w/o handrail w/o pain to allow pt to move her daughter into college  3  Pt to tolerate prolonged walking on uneven terrain w/o c/o   4  Improve balance such that pt can maintain R/L SLS x 30"  5  Pt to tolerate prolonged/static squat x 15" w/o c/o needed for gardening  Goals  Short Term Goals: Target Date 8/3/2022 - met  1  Pt to report at least 50% reduction in symptoms of dizziness w/ positional changes  2  Pt to be indep w/ HEP for VRT as needed      Long Term Goals: Target Date 8/25/2022 - progressing toward  1  Indep with HEP if needed for VRT  2  Pt to report 0/10 remaining dizziness w/ positional changes  3  Pt to score 0/100 on DHI  Plan  Plan details:     Planned therapy interventions: neuromuscular re-education, home exercise program, patient education and balance/weight bearing training  Frequency: 2x week  Plan of Care beginning date: 7/7/2022  Plan of Care expiration date: 10/6/2022  Treatment plan discussed with: patient        Subjective Evaluation    History of Present Illness  Mechanism of injury: Subjective   8/2/2022: Pt reports she is electing D/C for PT for her right knee  She has resumed PLOF w/o pain, she has mild discomfort w/ very deep squatting, but can still do it  She reports her vertigo is much better, but she had a return of mild symptoms after swimming a few days ago  She will follow up w/ her PCP 8/4/2022 for a previously scheduled appt  She notices mild symptoms w/ brief duration w/ bed mobility, bending over and looking up sometimes  7/28/2022: Pt has attended 5 visits for her knee post menisectomy  She had an appt today for her knee, but reports she woke w/ severe vertigo yesterday and again this morning  She first noticed a little dizziness transferring off the plinth from supine during PT 7/26/2022, but it was brief and not severe   The past 24 hours have been severe dizziness w/ positional changes, getting OOB, bending over or turning in bed  She has no pain associated with her dizziness, but does report nausea  She denies HA, vomiting or tinnitus  No recent illness  She rates her dizziness at worst at 10/10     2022: Pt reports her right knee started bothering her in 2022, but became severely worse in May 2022 while she was putting shoes on balancing on R LE felt a painful pop  Pain -10/10  She went to the ED and was referred to ortho  She failed conservative management, and proceeded w/ surgery  Pain  Current pain ratin  At best pain ratin  At worst pain rating: 3  Exacerbated by: very deep squatting  Progression: improved    Social Support  Steps to enter house: yes  Stairs in house: yes   Lives in: multiple-level home      Diagnostic Tests  X-ray: abnormal  MRI studies: abnormal  Treatments  Current treatment: physical therapy  Patient Goals  Patient goals for therapy: increased strength, independence with ADLs/IADLs, return to sport/leisure activities, decreased pain and increased motion          Objective    AROM:   R  R  L     2022  Knee flex sup/prone 120/112 85/85  120/112  Knee ext Qset/SLR 0/0  -5/-9*  +3/+3    MMT:    R  R  L     2022  Knee flex  5/5  4-/5*  5/5  Knee exten  5/5  3+/5*  5/5  Hip flexion  5/5  4+/5  5/5  Hip ER   5/5  4-/5*  5/5  Hip IR   5/5  4/5  5/5  Hip exten  5/5  4/5  5/5  Ankle DF  5/5  5/5  5/5  Ankle PF  5/5  4/5  5/5  Hip abd  5/5  5/5  5/5      Observation:   2022 - incision fully healed; no edema  2022 - pt has removed the post op dressing and replaced it w/ 2 band-aids, no redness or drainage; 2 suture sites    Balance:   2022  SLS R w/ EO:  1 min+  NT  SLS L w/ EO:  1 min+  NT    Function:  2022: no functional limitations re: knee except pt notes mild discomfort w/ full deep squat; stairs are recip w/o rail allowing pt to carry items   Pt reports vertigo is 75% better, she had a return of mild/brief symptoms w/ bed mobility after swimming a few days ago  7/7/2022: stairs are non-recip w/ rail  Pt ambulates w/ RW (instructed for SPC today during eval)  Pt needs UE assist for sit to stand  She cannot squat  Pt notes difficulty/pain w/ walking on uneven terrain  Palpation:  8/2/2022 - WNL LE's  7/7/2022 - Homen's sign (-) B/L; patellar mobility WNL B/L    Cervical AROM limitation:   8/2/2022 7/28/2022  Flexion - nil michele    nil michele  Extension - min michele  min michele 4/10 dizziness  R rotation - nil michele  nil michele  L rotation - nil michele  nil michele  R Lat flexion - min im  min michele  L lat flexion - min michele  min michele  Retraction - min michele  min michele      Oculomotor Screen: baseline 0/10       8/2/2022 7/28/2022  Smooth Pursuits (vert/horiz/diag/J) -  0/10   0/10  Near Point Convergence -   0/10 5 cm  0/10 5 cm (4 cm= normal)  Saccades 10x each - Horizontal -  0/10   0/10  Saccades 10x each- Vertical -  0/10   0/10  VOR screen -     0/10   2/10 dizziness/nausea/fogginess/HA  VOR cancel /VMS-    0/10   4/10 dizziness/nausea/fogginess/HA  Head Thrust (ipsilateral)-   0/10   0/10 dizziness/nausea/fogginess/HA; no corrective saccades    mCTSIB 8/2/2022 7/28/2022  - FTEO (firm) - 60+ sec 60+ sec  - FTEC (firm) - 60+ sec 60+ sec  - FTEO (foam) - 60+ sec 60+ sec  - FTEC (foam) - 30 sec unable    Fukuda step test:  8/2/2022 - (-)  7/28/2022 - (-)    DHI:  8/2/2022 -14/100 7/28/2022 - 46/100 (0-30 =mild; 31-60 = mod;  = severe; >60= fall risk)    Headache Frequency - none; meds NA    Mechanical Assessment:  8/2/2022 - DixHalpike (+) R w/ upbeating to left lasting 8 sec; negative after Epley  7/28/2022 - Kris (-) L; (+) R w/ upbeating to the left lasting 25 sec; negative after Epley  Access Code: [email or web address]  URL: [email or web address]  Date: 07/07/2022  Prepared by:  Yoel Ely    Exercises  · Supine Heel Slide with Strap - 1 x daily - 7 x weekly - 10 reps - 10 hold  · Long Sitting Calf Stretch with Strap - 1 x daily - 7 x weekly - 3 reps - 20 hold  · Supine Straight Leg Raises - 1 x daily - 7 x weekly - 2 sets - 5 reps  · Prone Knee Flexion AROM - 1 x daily - 7 x weekly - 2 sets - 10 reps  · Prone Quadriceps Set - 1 x daily - 7 x weekly - 10 reps - 5 hold  · Heel rises with counter support - 1 x daily - 7 x weekly - 1 sets - 10 reps  · Toe Raises with Counter Support - 1 x daily - 7 x weekly - 1 sets - 10 reps  · Standing Marching - 1 x daily - 7 x weekly - 1 sets - 15 reps          Precautions:   Past Medical History:   Diagnosis Date    Asthma     Broken tooth     upper right-back    High cholesterol     Hypertension     Morbid obesity with BMI of 45 0-49 9, adult (HCC)     Pain in right knee     MRI on 6/7/22       DOS: 7/5/2022  SOC: 7/7/2022 R knee; 7/28/2022 vertigo  FOTO: 8/2/2022 R knee; 8/2/2022 vertigo  RE: 8/2/2022: R knee/vertigo  DAILY TREATMENT LOG    DATE 7/28/2022  FOTO vert  Eval vert 8/2/2022  RE/FOTO knee & vertigo 7/14/2022 7/19/2022 7/26/2022   VISIT # 6 7 3  4 5 FOTO    AUTH   3/12 4/12 5/12   AUTH DATE    10/7/2022    MANUALS                Evaluation Vertigo TT 25' RE vertigo & knee      Neuro Re-Ed 20' 30' CRT & neuro RE 10'  10'    CRT R side Epley for post canal 2x w/ 5' break in btwn 2x w/ 5' break in bwtwn      Pt educ Krista & mech cause of vertigo; avoid excessive head mvts until next visit       Clamshells    3" 2x10  3" 2x10  YTB  2x10     Alt 2 cone taps        SLS        Stand steamboats w/ TB   grn TB @ knees 2x5 ea w/ rail  grn TB @ knees 2x5 ea w/ rail  grn TB @ knees 2x5 ea w/ rail    Monster walks    GTB knees 10'x3  GTB knees 10'x3; added side stepping and reverse monster walks Fwd/bwd GTB   15'x2    Side stepping w/ TB      GTB 15'x2           Ther Ex  10' 35' 35'    ROM/MMT LE's  10'      bike   Full ROM 5'  Full ROM 5' Full ROM 5'    DF w/ SOS   20"x3  20"x3     Heel slide w/ SOS   10x10"  10x10"  Prone quad stretch 20"x4 R   qset SLR   Qset w/ SLR 2x10 R/L  Qset w/ SLR 2x10 R/L; attempted 1 5 # weight, patient reported increased pain medially  qset w/ SLR flex/abd 2x10 ea   Prone knee flexion        Prone quad set      5"x15    Stand B/L HR & TR   HR off step 2x10 HR off step 2x10    Stand alt hip flexion        bridges   3" 2x10  3" 2x10 , added adduction squeeze W/ add 3" 2x10    TB hams   grn 2x10  grn 2x10     LAQ w/ Add   3" 2x10  3" 2x10  5" 2x10     Supine leg press   B/L 30# 2x10   Uni 10# 2x10  B/L 30# 2x10   Uni 10# 2x10  B/L 45# 2x10   Uni 20# 2x10     Gait Training                        Modalities        CP to end R knee

## 2022-08-05 ENCOUNTER — APPOINTMENT (OUTPATIENT)
Dept: PHYSICAL THERAPY | Facility: CLINIC | Age: 52
End: 2022-08-05
Payer: COMMERCIAL

## 2022-08-07 ENCOUNTER — HOSPITAL ENCOUNTER (OUTPATIENT)
Dept: SLEEP CENTER | Facility: CLINIC | Age: 52
Discharge: HOME | End: 2022-08-07
Payer: COMMERCIAL

## 2022-08-07 DIAGNOSIS — G47.33 OSA (OBSTRUCTIVE SLEEP APNEA): ICD-10-CM

## 2022-08-07 PROCEDURE — G0399 HOME SLEEP TEST/TYPE 3 PORTA: HCPCS

## 2022-08-07 NOTE — PROGRESS NOTES
Home Sleep Study Documentation    HOME STUDY DEVICE: Noxturnal yes                                           Rosamaria G3 no      Pre-Sleep Home Study:    Set-up and instructions performed [facility] OSKAR Parker, [facility]    Technician performed demonstration for Patient: yes    Return demonstration performed by Patient: yes    Written instructions provided to Patient: yes    Patient signed consent form: yes        Post-Sleep Home Study:    Additional comments by Patient: None    Home Sleep Study Failed:no:    Failure reason: N/A    Reported or Detected: N/A    Scored by: E Lander Kussmaul

## 2022-08-09 DIAGNOSIS — G47.33 OSA (OBSTRUCTIVE SLEEP APNEA): Primary | ICD-10-CM

## 2022-08-10 ENCOUNTER — TELEPHONE (OUTPATIENT)
Dept: PHYSICAL THERAPY | Facility: CLINIC | Age: 52
End: 2022-08-10

## 2022-08-10 NOTE — TELEPHONE ENCOUNTER
Called pt to inquire about her status  She reports she has had a few mild episodes, but overall is much better  She will follow up w/ her doctor next week, we agreed to keep PT on hold for now

## 2022-08-11 ENCOUNTER — TELEPHONE (OUTPATIENT)
Dept: SLEEP CENTER | Facility: CLINIC | Age: 52
End: 2022-08-11

## 2022-08-11 NOTE — TELEPHONE ENCOUNTER
Called patient, left message that sleep study is resulted and to call nursing staff back  Home sleep study shows mild BRAEDEN and Dr Jean Carlos Cornell has placed an order for APAP  Needs DME setup and compliance follow-up appointments scheduled

## 2022-08-29 ENCOUNTER — TELEPHONE (OUTPATIENT)
Dept: OTHER | Facility: OTHER | Age: 52
End: 2022-08-29

## 2022-08-29 NOTE — TELEPHONE ENCOUNTER
Patient is calling regarding cancelling an appointment      Date/Time: 8/29/22 @ 1030    Patient was rescheduled: YES [] NO [x]    Patient requesting call back to reschedule: YES [] NO [x]

## 2022-08-29 NOTE — PROGRESS NOTES
8/29/2022: will resolve episode of care for now  Pt has not called to schedule more appts for vertigo for 3 weeks and was to call as needed

## 2022-08-30 ENCOUNTER — TELEPHONE (OUTPATIENT)
Dept: BARIATRICS | Facility: CLINIC | Age: 52
End: 2022-08-30

## 2022-08-30 NOTE — TELEPHONE ENCOUNTER
Left message to reschedule cancelled appointment for yesterday, SW direct number and  number given for patient to call back

## 2022-09-07 ENCOUNTER — CONSULT (OUTPATIENT)
Dept: CARDIOLOGY CLINIC | Facility: CLINIC | Age: 52
End: 2022-09-07
Payer: COMMERCIAL

## 2022-09-07 VITALS
BODY MASS INDEX: 47.09 KG/M2 | HEIGHT: 66 IN | SYSTOLIC BLOOD PRESSURE: 122 MMHG | TEMPERATURE: 97 F | OXYGEN SATURATION: 97 % | HEART RATE: 83 BPM | WEIGHT: 293 LBS | DIASTOLIC BLOOD PRESSURE: 80 MMHG

## 2022-09-07 DIAGNOSIS — G47.33 OSA (OBSTRUCTIVE SLEEP APNEA): ICD-10-CM

## 2022-09-07 DIAGNOSIS — R06.02 EXERTIONAL SHORTNESS OF BREATH: ICD-10-CM

## 2022-09-07 DIAGNOSIS — I10 ESSENTIAL HYPERTENSION: ICD-10-CM

## 2022-09-07 DIAGNOSIS — E11.9 NEW ONSET TYPE 2 DIABETES MELLITUS (HCC): ICD-10-CM

## 2022-09-07 DIAGNOSIS — E66.01 CLASS 3 SEVERE OBESITY DUE TO EXCESS CALORIES WITH BODY MASS INDEX (BMI) OF 50.0 TO 59.9 IN ADULT, UNSPECIFIED WHETHER SERIOUS COMORBIDITY PRESENT (HCC): ICD-10-CM

## 2022-09-07 DIAGNOSIS — Z01.810 PREOPERATIVE CARDIOVASCULAR EXAMINATION: ICD-10-CM

## 2022-09-07 DIAGNOSIS — E78.2 MIXED HYPERLIPIDEMIA: ICD-10-CM

## 2022-09-07 PROCEDURE — 99205 OFFICE O/P NEW HI 60 MIN: CPT | Performed by: INTERNAL MEDICINE

## 2022-09-07 PROCEDURE — 93000 ELECTROCARDIOGRAM COMPLETE: CPT | Performed by: INTERNAL MEDICINE

## 2022-09-07 NOTE — PROGRESS NOTES
Consultation - Cardiology Office   [facility]  Natalie Kirk 45 y o  female MRN: [id number]  : 1970  Unit/Bed#:  Encounter: [id number]      Assessment:     1  Exertional shortness of breath    2  Essential hypertension    3  New onset type 2 diabetes mellitus ([facility] )    4  Mixed hyperlipidemia    5  BRAEDEN (obstructive sleep apnea)    6  Class 3 severe obesity due to excess calories with body mass index (BMI) of 50 0 to 59 9 in adult, unspecified whether serious comorbidity present ([facility] )    7  Preoperative cardiovascular examination        Discussion summary and Plan:    1  Exertional shortness of breath  Patient's shortness of breath may be multifactorial   She had a BMI around 50 but she had multiple risk factors diabetic, family history of heart disease as well as history of dyslipidemia  She will be scheduled for echo Doppler and exercise stress test   She has think she can walk on the treadmill  2  Essential hypertension  Blood pressure is acceptable with lisinopril and metoprolol XL  Continue same medications  3  Dyslipidemia  She has severe dyslipidemia  She is on statin  She was started on Crestor 40 mg  Her cholesterol was more than 300  Need to be compliant with medications  4  Type 2 diabetes mellitus  Management as per medical team     5  Obstructive sleep apnea  She has mild sleep apnea she feels once she loses weight she went not need any machine  6  Obesity with BMI around 50  She is looking for bariatric surgery  Lifestyle modification strongly recommended      7  Preoperative clearance for bariatric surgery  Patient will need cardiac workup in the form of echo Doppler and exercise stress test before clearance  Further plan as also of these tests become available        Patient  was advised and educated to call our office  immediately if  patient has any new symptoms of chest pain/shortness of breath, near-syncope, syncope, light headedness sustained palpitations or any other cardiovascular symptoms before their scheduled follow-up appointment  Office number was provided #[phone]  Thank you for your consultation  If you have any question please call me at [phone]    Counseling :  A description of the counseling  Goals and Barriers  Patient's ability to self care: Yes  Medication side effect reviewed with patient in detail and all their questions answered to their satisfaction  Primary Care Physician Requesting Consult: Gray Come, DO    Reason for Consult / Principal Problem:  Preoperative clearance, exertional shortness of breath  HPI :     Vaughn Holt is a 45y o  year old female who was referred by primary care doctor for preoperative clearance and exertional shortness of breath  Patient who has medical history significant for newly diagnosed diabetes mellitus, dyslipidemia with cholesterol around 300 recently started on high intensity statins, hypertension, mild sleep apnea, DJD with history of knee surgery now need bariatric surgery  She had a family history of heart problem with her father having open heart surgery  She is motivated to lose weight she tried other methods now she is considering bariatric surgery  She denies any chest pain  She has been compliant with her medications her blood pressure is acceptable her cholesterol was very elevated  She will be watching her diet also  She has been struggling for her weight for long period of time      Review of Systems    Historical Information   Past Medical History:   Diagnosis Date    Asthma     Broken tooth     upper right-back    High cholesterol     Hypertension     Morbid obesity with BMI of 45 0-49 9, adult (HCC)     Pain in right knee     MRI on 22     Past Surgical History:   Procedure Laterality Date     SECTION      EGD  2022    food in the stomach-repeat EGD on 22    LAPAROSCOPY      SC KNEE SCOPE,MED/LAT MENISECTOMY Right 7/5/2022    Procedure: KNEE ARTHROSCOPY  MEDIAL MENISCECTOMY AND CHONDRYLPLASTY;  Surgeon: Benn Landau, MD;  Location: [facility];  Service: Orthopedics    TONSILLECTOMY      WISDOM TOOTH EXTRACTION       Social History     Substance and Sexual Activity   Alcohol Use Yes    Comment: rare     Social History     Substance and Sexual Activity   Drug Use Never     Social History     Tobacco Use   Smoking Status Never Smoker   Smokeless Tobacco Never Used     Family History:   Family History   Problem Relation Age of Onset    Hypertension Mother     Stroke Mother     Diabetes Father     Hypertension Father     Heart disease Father     Thyroid disease Neg Hx        Meds/Allergies     No Known Allergies    Current Outpatient Medications:     amLODIPine (NORVASC) 5 mg tablet, Take 1 tablet (5 mg total) by mouth daily, Disp: 90 tablet, Rfl: 0    aspirin (ECOTRIN) 325 mg EC tablet, Take 1 tablet (325 mg total) by mouth daily, Disp: 21 tablet, Rfl: 0    lisinopril (ZESTRIL) 10 mg tablet, Take 1 tablet (10 mg total) by mouth daily (Patient taking differently: Take 10 mg by mouth every morning), Disp: 90 tablet, Rfl: 1    metFORMIN (GLUCOPHAGE) 500 mg tablet, Take 1 tablet (500 mg total) by mouth daily with breakfast, Disp: 90 tablet, Rfl: 1    metoprolol succinate (TOPROL-XL) 50 mg 24 hr tablet, Take 1 tablet (50 mg total) by mouth daily, Disp: 90 tablet, Rfl: 0    rosuvastatin (CRESTOR) 40 MG tablet, Take 1 tablet (40 mg total) by mouth daily, Disp: 90 tablet, Rfl: 1    ibuprofen (MOTRIN) 200 mg tablet, Take 600 mg by mouth every 6 (six) hours as needed for mild pain Last dose 6/28/22 (Patient not taking: Reported on 9/7/2022), Disp: , Rfl:     vitamin E, tocopherol, 400 units capsule, Take 400 Units by mouth daily Last dose 6/27/22 (Patient not taking: Reported on 9/7/2022), Disp: , Rfl:     Vitals: Blood pressure 122/80, pulse 83, temperature (!) 97 °F (36 1 °C), height 5' 6" (1 676 m), weight (!) 142 kg (314 lb), SpO2 97 %  ?  Body mass index is 50 68 kg/m²  Wt Readings from Last 3 Encounters:   09/07/22 (!) 142 kg (314 lb)   07/29/22 (!) 141 kg (311 lb 12 8 oz)   07/21/22 (!) 142 kg (312 lb 9 6 oz)     Vitals:    09/07/22 1511   Weight: (!) 142 kg (314 lb)     BP Readings from Last 3 Encounters:   09/07/22 122/80   07/21/22 124/86   07/13/22 135/82         Physical Exam    Neurologic:  Alert & oriented x 3, no new focal deficits, Not in any acute distress,  Constitutional:  Adequate built, non-toxic appearance   Neck: Normal range of motion, no tenderness,  Neck supple   Respiratory:  Bilateral air entry, mostly clear to auscultation  Cardiovascular: S1-S2 regular with a 2/6 ejection systolic murmur and S4 is present  GI:  Soft, nondistended,  nontender, no hepatosplenomegaly appreciated  Musculoskeletal:  No edema, no tenderness, no deformities  Skin:  Well hydrated, no rash   Extremities:  No edema and distal pulses are present  Psychiatric:  Speech and behavior appropriate     Diagnostic Studies Review Cardio:      EKG:  Twelve lead EKG 09/07/2022 shows normal sinus rhythm heart rate 83 beats per minute  Home Study    Result Date: 8/9/2022  Narrative: Home Sleep Testing Report Patient Name: Stormy Ugarte Patient Number: [id number] Dates of Home Study: 8/7/22 Referring Physician: Dr Francis Leonard Interpreting Physician: Dr Ralf Long YOB: 1970 STUDY FORMAT: The patient was admitted to the sleep laboratory at the [address] [place] [address] and oriented to the home sleep study testing procedure and equipment  The home sleep testing study was performed using the [employer] Nox-T3 Recorder which is a Type III monitoring system  A return demonstration by the patient helped to assure correct usage    The following parameters were monitored: p-flow via nasal cannula, body position, oximetry, pulse rate and respiratory effort via thoracic and abdominal RIP belts  The sleep study was scored following the rules established by the American Academy of Sleep Medicine (AASM)  Hypopneas are defined as a ?30% drop in flow for ? 10 seconds that are associated with ?4% desaturations  NAKUL is the Respiratory Event Index (number of respiratory events per hour) and is a surrogate for the apnea/hypopnea index (AHI)  PATIENT HISTORY: Home sleep testing was undertaken to evaluate this patient with suggestive symptoms and /or risk factors for sleep  disordered breathing  TESTING RESULTS: The test results are from Night of 8/7/22  The total time in bed (analysis time) was 6 hours 12 minutes  The patient had a total of 59 respiratory events made up of 2 obstructive apneas, 2 central apneas, 0 mixed apneas and 55 hypopneas resulting in a respiratory event index (NAKUL) of 9 6  The lowest SpO2 recorded is 86 0% and 2 7% of the study was spent with saturations below 90%  The snore index was 29%     Impression: Mild obstructive sleep apnea - Limitations of home sleep testing compromises accuracy  Based on the results of this study and and her comorbidities, positive airway pressure is recommended  Weight reduction may remediate Mild BRAEDEN  Other treatment options include Myofunctional therapy, Neuromuscular electrical stimulation, Oral appliance therapy or upper airway surgery   Board Certified Sleep Physician      Lab Review   Lab Results   Component Value Date    WBC 7 17 06/27/2022    HGB 14 7 06/27/2022    HCT 43 4 06/27/2022    MCV 86 06/27/2022    RDW 13 3 06/27/2022     06/27/2022     BMP:  Lab Results   Component Value Date    SODIUM 138 06/27/2022    K 4 0 06/27/2022     06/27/2022    CO2 27 06/27/2022    BUN 9 06/27/2022    CREATININE 0 56 (L) 06/27/2022    GLUF 174 (H) 06/27/2022    CALCIUM 9 0 06/27/2022    EGFR 107 06/27/2022    MG 1 9 08/05/2021     Troponins:    LFT:  Lab Results   Component Value Date    AST 34 06/27/2022    ALT 48 06/27/2022    ALKPHOS 69 06/27/2022    TP 7 9 06/27/2022    ALB 3 8 06/27/2022      Lab Results   Component Value Date    JMD0HBHHKAJO 1 720 06/27/2022     No components found for: [employer] [facility]  Lab Results   Component Value Date    HGBA1C 8 0 (H) 06/27/2022     Lipid Profile:   Lab Results   Component Value Date    CHOLESTEROL 209 (H) 06/27/2022    HDL 48 (L) 06/27/2022    LDLCALC 109 (H) 06/27/2022    TRIG 258 (H) 06/27/2022     Lab Results   Component Value Date    CHOLESTEROL 209 (H) 06/27/2022    CHOLESTEROL 332 (H) 03/30/2022           Dr Katherine Gonzales MD [facility]      "This note was completed in part utilizing m-modal fluency direct voice recognition software  Grammatical errors, random word insertion, spelling mistakes, and incomplete sentences may be an occasional consequence of the system secondary to software limitations, ambient noise and hardware issues  Please read the chart carefully and recognize, using context, where substitutions have occurred    If you have any questions or concerns about the context, text or information contained within the body of this dictation, please contact myself, the provider, for further clarification "

## 2022-09-18 DIAGNOSIS — I10 ESSENTIAL HYPERTENSION: ICD-10-CM

## 2022-09-19 ENCOUNTER — TELEPHONE (OUTPATIENT)
Dept: BARIATRICS | Facility: CLINIC | Age: 52
End: 2022-09-19

## 2022-09-19 PROCEDURE — 4010F ACE/ARB THERAPY RXD/TAKEN: CPT | Performed by: PHYSICIAN ASSISTANT

## 2022-09-19 RX ORDER — LISINOPRIL 10 MG/1
TABLET ORAL
Qty: 90 TABLET | Refills: 1 | Status: SHIPPED | OUTPATIENT
Start: 2022-09-19

## 2022-09-19 NOTE — TELEPHONE ENCOUNTER
Patient emailed back to report that she does have an appointment with Elo Finch on 9/21 which will count for plus 1  She is able to meet with SW on 10/21 virtually to continue monthly check ins

## 2022-09-19 NOTE — TELEPHONE ENCOUNTER
SELINA left message for patient and followed up with email regarding canceled appointment with surgeon and enquiring if she would like to continue surgical process  Patient emailed back the following:     Tricia,   Thanks for reaching out  I believe I have completed all my requirements so far  I did complete the sleep study; the results are in my chart  I had hoped I would be done by the time I saw the surgeon again, but now I need a stress test and an ekg to get clearance  I have gone in every month and I think I have fulfilled my required weight checks  My next appt with the surgeon will hopefully be my last one, which is why I did not meet with him for a second time knowing I did not have clearance yet  The appointments are a lot and I am trying not to add extra appointments that my not be needed  SELINA response:    Wayne Kamara,    Yes you did complete 3 weight checks as required by insurance but they also require another visit with the surgeon 90 days after the initial consult with him  We do need to get that final appointment in with the surgeon to complete insurance requirements but then continue to see you monthly until you get to surgery per requirements of our program   I completely understand that coming in person is not always feasible so after seeing the surgeon in person, we can offer to rotate virtual and in person visits each month to keep up with the program   So we can schedule you with the surgeon or PA this month and then in October schedule a virtual visit with either myself or the dietitian  Would that work? We do need to schedule some type of visit in September to avoid having to redo the three weight checks again, otherwise insurance may deny coverage since there was a gap in visits    Here is availability for this month:  9/23 with PA at 9:30, 1:30 or 2pm  9/28 with surgeon at 1pm  9/29 with PA at 9, 10, 11:30 or 3pm   Please let us know what would work so we can get this appointment scheduled and keep you on track to submit to insurance once we help you get back to your start weight of 307 2 and cardiology clearance is received  Thanks! Will wait for response

## 2022-09-21 ENCOUNTER — OFFICE VISIT (OUTPATIENT)
Dept: BARIATRICS | Facility: CLINIC | Age: 52
End: 2022-09-21
Payer: COMMERCIAL

## 2022-09-21 VITALS
HEIGHT: 67 IN | WEIGHT: 293 LBS | BODY MASS INDEX: 45.99 KG/M2 | SYSTOLIC BLOOD PRESSURE: 132 MMHG | DIASTOLIC BLOOD PRESSURE: 84 MMHG | HEART RATE: 103 BPM

## 2022-09-21 DIAGNOSIS — E11.9 NEW ONSET TYPE 2 DIABETES MELLITUS (HCC): ICD-10-CM

## 2022-09-21 DIAGNOSIS — E78.2 MIXED HYPERLIPIDEMIA: ICD-10-CM

## 2022-09-21 DIAGNOSIS — G47.33 OSA (OBSTRUCTIVE SLEEP APNEA): ICD-10-CM

## 2022-09-21 DIAGNOSIS — I10 ESSENTIAL HYPERTENSION: ICD-10-CM

## 2022-09-21 DIAGNOSIS — E66.01 OBESITY, CLASS III, BMI 40-49.9 (MORBID OBESITY) (HCC): Primary | ICD-10-CM

## 2022-09-21 PROCEDURE — 3075F SYST BP GE 130 - 139MM HG: CPT | Performed by: PHYSICIAN ASSISTANT

## 2022-09-21 PROCEDURE — 3079F DIAST BP 80-89 MM HG: CPT | Performed by: PHYSICIAN ASSISTANT

## 2022-09-21 PROCEDURE — 99214 OFFICE O/P EST MOD 30 MIN: CPT | Performed by: PHYSICIAN ASSISTANT

## 2022-09-21 NOTE — ASSESSMENT & PLAN NOTE
· Interested in [address] with Dr Keane Olszewski; 3+1 weight checks completed as of today  · 5% Weight loss-294lbs DOS goal   · Screening labs-Completed  · Support Group-Completed  · Cardiac Risk Assessment-Awaiting stress test/ECHO on 09/28/22  · Upper Endoscopy-Completed twice d/t retained food on first EGD in June; H  Pylori biopsy-Negative  · Sleep Medicine Assessment-mild BRAEDEN; no CPAP needed  · Alcohol and nicotine use is not recommended following bariatric surgery  · NSAIDs should be avoided following bariatric surgery  · Advised that pregnancy should be avoided following bariatric surgery for 12-18 months and should not solely rely on OCP and consider additional/alternative sources for contraception due to potential absorption issues-Not applicable; s/p menopause    After review and reassessment of the patient and chart, I still deem it medically necessary that Barb Montez undergo metabolic and bariatric surgery  More specifically, laparoscopic stella-en-y gastric bypass/sleeve gastrectomy  Pt will be on our program mandated 800 calorie liver shrinking, very low calorie preoperative diet for 2-4 weeks prior to surgery

## 2022-09-21 NOTE — PROGRESS NOTES
Assessment/Plan:    Obesity, Class III, BMI 40-49 9 (morbid obesity) ([facility] )  · Interested in Shaila-En-Y Gastric Bypass with Dr Puneet Ireland; 3+1 weight checks completed as of today  · 5% Weight loss-294lbs DOS goal   · Screening labs-Completed  · Support Group-Completed  · Cardiac Risk Assessment-Awaiting stress test/EKG  · Upper Endoscopy-Completed twice d/t retained food on first EGD in June; H  Pylori biopsy-Negative  · Sleep Medicine Assessment-mild BRAEDEN; on APAP  · Alcohol and nicotine use is not recommended following bariatric surgery  · NSAIDs should be avoided following bariatric surgery  · Advised that pregnancy should be avoided following bariatric surgery for 12-18 months and should not solely rely on OCP and consider additional/alternative sources for contraception due to potential absorption issues-Not applicable; s/p menopause    After review and reassessment of the patient and chart, I still deem it medically necessary that [facility] undergo metabolic and bariatric surgery  More specifically, laparoscopic shaila-en-y gastric bypass/sleeve gastrectomy  Pt will be on our program mandated 800 calorie liver shrinking, very low calorie preoperative diet for 2-4 weeks prior to surgery           New onset type 2 diabetes mellitus ([facility]    Lab Results   Component Value Date    HGBA1C 8 0 (H) 06/27/2022     -On metformin  -Should improve s/p bariatric surgery  -Continue monitoring and management with prescribing provider      BRAEDEN (obstructive sleep apnea)  -Mild BRAEDEN; no CPAP prescribed  -Should improve s/p bariatric surgery    Essential hypertension  -on amlodipine, metoprolol  -May improve s/p bariatric surgery  -Continue monitoring and management with prescribing provider      Mixed hyperlipidemia  -on statin  -Avoid fried foods and trans fat, limit saturated fats and refined carbohydrates  -Increase fish/omega 3 FA consumption  -Increase physical activity  -May improve s/p bariatric surgery    Follow up in approximately 2 weeks with Surgical   25 minute visit, >50% face-to-face time spent counseling patient on diet behavior and exercise modification for weight loss  Reviewed the importance of following the lessons in the manual and the dietary, behavioral, and exercise changes for long-term success following bariatric surgery  Reviewed potential complications if not following the recommendations in the manual, such as dumping syndrome and vomiting  GOALS:  · Food log (ie ) www myfitnesspal com,sparkpeople  com,loseit com,calorieking  com, baritastPinpoint MD, etc    · No sugary beverages  At least 64oz of water daily  · Increase exercise by 10 minutes per day  Gradually increase physical activity to a goal of 5 days per week for 30 minutes of MODERATE intensity PLUS 2 days per week of FULL BODY resistance training  · Follow the 30/60 minute rule  · Follow lessons 1-6 in the bariatric manual   · No carbonated beverages  Diagnoses and all orders for this visit:    Obesity, Class III, BMI 40-49 9 (morbid obesity) ([facility] )    New onset type 2 diabetes mellitus (HCC)    BAREDEN (obstructive sleep apnea)    Essential hypertension    Mixed hyperlipidemia          Subjective:   Chief Complaint   Patient presents with    Weight Check     Pt is here today for final weight check  Patient ID: Pieter Peters  is a 45 y o  female with excess weight/obesity here to pursue weight managment  Patient is pursuing Shaila-En-Y Gastric Bypass  HPI:  Down 8lbs in last 1 5 months   She is eating less refined CHO, drinking protein shake for breakfast    The patient has been:  Following the lessons in the manual- yes  Practicing the 30/60 minute rule- no; agrees to start  Food logging- no; agrees to start  Exercise- yes, just started walking s/p knee surgery in July    The following portions of the patient's history were reviewed and updated as appropriate: allergies, current medications, past family history, past medical history, past social history, past surgical history and problem list     Review of Systems   Constitutional: Negative for chills and fever  Unexpected weight change: planned weight loss  HENT: Negative for trouble swallowing  Respiratory: Negative for cough and shortness of breath  Cardiovascular: Negative for chest pain and palpitations  Gastrointestinal: Negative for abdominal pain, constipation, diarrhea, nausea and vomiting  Musculoskeletal: Positive for arthralgias  Neurological: Negative for dizziness  Psychiatric/Behavioral:        Denies anxiety and depression       Objective:    /84 (BP Location: Right arm, Patient Position: Sitting, Cuff Size: Large)   Pulse 103   Ht 5' 7" (1 702 m)   Wt (!) 138 kg (303 lb 6 4 oz)   BMI 47 52 kg/m²      Physical Exam  Vitals reviewed  Constitutional:       General: She is not in acute distress  Appearance: She is well-developed  She is obese  Comments: obese   HENT:      Head: Normocephalic and atraumatic  Eyes:      General: No scleral icterus  Cardiovascular:      Rate and Rhythm: Normal rate and regular rhythm  Pulmonary:      Effort: No respiratory distress  Abdominal:      Palpations: Abdomen is soft  Tenderness: There is no abdominal tenderness  Neurological:      Mental Status: She is alert and oriented to person, place, and time     Psychiatric:         Mood and Affect: Mood normal          Behavior: Behavior normal

## 2022-09-21 NOTE — ASSESSMENT & PLAN NOTE
-on amlodipine, metoprolol  -May improve s/p bariatric surgery  -Continue monitoring and management with prescribing provider

## 2022-09-21 NOTE — ASSESSMENT & PLAN NOTE
-on statin  -Avoid fried foods and trans fat, limit saturated fats and refined carbohydrates  -Increase fish/omega 3 FA consumption  -Increase physical activity  -May improve s/p bariatric surgery

## 2022-09-21 NOTE — ASSESSMENT & PLAN NOTE
Lab Results   Component Value Date    HGBA1C 8 0 (H) 06/27/2022     -On metformin  -Should improve s/p bariatric surgery  -Continue monitoring and management with prescribing provider

## 2022-09-28 ENCOUNTER — HOSPITAL ENCOUNTER (OUTPATIENT)
Dept: NON INVASIVE DIAGNOSTICS | Facility: HOSPITAL | Age: 52
Discharge: HOME | End: 2022-09-28
Attending: INTERNAL MEDICINE
Payer: COMMERCIAL

## 2022-09-28 VITALS
DIASTOLIC BLOOD PRESSURE: 86 MMHG | WEIGHT: 293 LBS | SYSTOLIC BLOOD PRESSURE: 140 MMHG | BODY MASS INDEX: 45.99 KG/M2 | HEIGHT: 67 IN | OXYGEN SATURATION: 100 % | HEART RATE: 85 BPM

## 2022-09-28 VITALS
HEART RATE: 73 BPM | DIASTOLIC BLOOD PRESSURE: 96 MMHG | SYSTOLIC BLOOD PRESSURE: 140 MMHG | HEIGHT: 66 IN | BODY MASS INDEX: 47.09 KG/M2 | WEIGHT: 293 LBS

## 2022-09-28 DIAGNOSIS — R06.02 EXERTIONAL SHORTNESS OF BREATH: ICD-10-CM

## 2022-09-28 DIAGNOSIS — G47.33 OSA (OBSTRUCTIVE SLEEP APNEA): ICD-10-CM

## 2022-09-28 DIAGNOSIS — E11.9 NEW ONSET TYPE 2 DIABETES MELLITUS (HCC): ICD-10-CM

## 2022-09-28 DIAGNOSIS — I10 ESSENTIAL HYPERTENSION: ICD-10-CM

## 2022-09-28 DIAGNOSIS — Z01.810 PREOPERATIVE CARDIOVASCULAR EXAMINATION: ICD-10-CM

## 2022-09-28 DIAGNOSIS — E78.2 MIXED HYPERLIPIDEMIA: ICD-10-CM

## 2022-09-28 DIAGNOSIS — E66.01 CLASS 3 SEVERE OBESITY DUE TO EXCESS CALORIES WITH BODY MASS INDEX (BMI) OF 50.0 TO 59.9 IN ADULT, UNSPECIFIED WHETHER SERIOUS COMORBIDITY PRESENT (HCC): ICD-10-CM

## 2022-09-28 LAB
CHEST PAIN STATEMENT: NORMAL
MAX DIASTOLIC BP: 84 MMHG
MAX HEART RATE: 151 BPM
MAX PREDICTED HEART RATE: 168 BPM
MAX. SYSTOLIC BP: 176 MMHG
PROTOCOL NAME: NORMAL
TARGET HR FORMULA: NORMAL
TEST INDICATION: NORMAL
TIME IN EXERCISE PHASE: NORMAL

## 2022-09-28 PROCEDURE — 93306 TTE W/DOPPLER COMPLETE: CPT

## 2022-09-28 PROCEDURE — 93017 CV STRESS TEST TRACING ONLY: CPT

## 2022-09-28 PROCEDURE — 93306 TTE W/DOPPLER COMPLETE: CPT | Performed by: INTERNAL MEDICINE

## 2022-09-29 ENCOUNTER — TELEPHONE (OUTPATIENT)
Dept: CARDIOLOGY CLINIC | Facility: CLINIC | Age: 52
End: 2022-09-29

## 2022-09-29 LAB
AORTIC ROOT: 3.4 CM
APICAL FOUR CHAMBER EJECTION FRACTION: 54 %
AV LVOT PEAK GRADIENT: 7 MMHG
AV PEAK GRADIENT: 9 MMHG
BASELINE ST DEPRESSION: 0 MM
DOP CALC LVOT AREA: 3.14 CM2
DOP CALC LVOT DIAMETER: 2 CM
E WAVE DECELERATION TIME: 179 MS
FRACTIONAL SHORTENING: 29 % (ref 28–44)
INTERVENTRICULAR SEPTUM IN DIASTOLE (PARASTERNAL SHORT AXIS VIEW): 1 CM
INTERVENTRICULAR SEPTUM: 1 CM (ref 0.6–1.1)
LAAS-AP2: 16 CM2
LAAS-AP4: 22.2 CM2
LEFT ATRIUM AREA SYSTOLE SINGLE PLANE A4C: 19.1 CM2
LEFT ATRIUM SIZE: 3.9 CM
LEFT INTERNAL DIMENSION IN SYSTOLE: 3.5 CM (ref 2.1–4)
LEFT VENTRICULAR INTERNAL DIMENSION IN DIASTOLE: 4.9 CM (ref 3.5–6)
LEFT VENTRICULAR POSTERIOR WALL IN END DIASTOLE: 1 CM
LEFT VENTRICULAR STROKE VOLUME: 63 ML
LVSV (TEICH): 63 ML
MAX HR PERCENT: 89 %
MAX HR: 151 BPM
MV E'TISSUE VEL-LAT: 10 CM/S
MV E'TISSUE VEL-SEP: 9 CM/S
MV PEAK A VEL: 1 M/S
MV PEAK E VEL: 96 CM/S
MV STENOSIS PRESSURE HALF TIME: 52 MS
MV VALVE AREA P 1/2 METHOD: 4.23 CM2
PV PEAK GRADIENT: 5 MMHG
RATE PRESSURE PRODUCT: NORMAL
RIGHT ATRIUM AREA SYSTOLE A4C: 18.3 CM2
RIGHT VENTRICLE ID DIMENSION: 3.8 CM
SL CV LEFT ATRIUM LENGTH A2C: 4.5 CM
SL CV LV EF: 55
SL CV PED ECHO LEFT VENTRICLE DIASTOLIC VOLUME (MOD BIPLANE) 2D: 113 ML
SL CV PED ECHO LEFT VENTRICLE SYSTOLIC VOLUME (MOD BIPLANE) 2D: 50 ML
SL CV STRESS RECOVERY BP: NORMAL MMHG
SL CV STRESS RECOVERY HR: 83 BPM
SL CV STRESS RECOVERY O2 SAT: 83 %
STRESS ANGINA INDEX: 0
STRESS BASELINE BP: NORMAL MMHG
STRESS BASELINE HR: 85 BPM
STRESS DUKE TREADMILL SCORE: 5
STRESS O2 SAT REST: 100 %
STRESS PEAK HR: 141 BPM
STRESS POST ESTIMATED WORKLOAD: 7 METS
STRESS POST EXERCISE DUR MIN: 5 MIN
STRESS POST EXERCISE DUR SEC: 10 SEC
STRESS POST O2 SAT PEAK: 97 %
STRESS POST PEAK BP: 176 MMHG
STRESS ST DEPRESSION: 0 MM
TR MAX PG: 25 MMHG
TR PEAK VELOCITY: 2.5 M/S
TRICUSPID VALVE PEAK REGURGITATION VELOCITY: 2.48 M/S

## 2022-09-29 PROCEDURE — 93018 CV STRESS TEST I&R ONLY: CPT | Performed by: INTERNAL MEDICINE

## 2022-09-29 PROCEDURE — 93016 CV STRESS TEST SUPVJ ONLY: CPT | Performed by: INTERNAL MEDICINE

## 2022-09-29 NOTE — TELEPHONE ENCOUNTER
----- Message from Katherine Gonzales MD sent at 9/29/2022  9:54 AM EDT -----  Patient's echo shows normal LV systolic function  No significant, to mild valvular disease  Patient can keep an appointment  Patient can be cleared for surgery    Please call patient about echo report  Pt's Patient's stress test is normal    Patient can keep regular appointment  Please call patient with the result

## 2022-10-11 ENCOUNTER — PREP FOR PROCEDURE (OUTPATIENT)
Dept: BARIATRICS | Facility: CLINIC | Age: 52
End: 2022-10-11

## 2022-10-11 DIAGNOSIS — E66.01 MORBID OBESITY (HCC): Primary | ICD-10-CM

## 2022-10-11 DIAGNOSIS — I10 ESSENTIAL HYPERTENSION, MALIGNANT: ICD-10-CM

## 2022-10-11 DIAGNOSIS — E11.9 DIABETES MELLITUS (HCC): ICD-10-CM

## 2022-10-17 DIAGNOSIS — I10 ESSENTIAL HYPERTENSION: ICD-10-CM

## 2022-10-17 RX ORDER — AMLODIPINE BESYLATE 5 MG/1
TABLET ORAL
Qty: 90 TABLET | Refills: 0 | Status: SHIPPED | OUTPATIENT
Start: 2022-10-17

## 2022-10-17 RX ORDER — METOPROLOL SUCCINATE 50 MG/1
TABLET, EXTENDED RELEASE ORAL
Qty: 90 TABLET | Refills: 0 | Status: SHIPPED | OUTPATIENT
Start: 2022-10-17

## 2022-10-26 ENCOUNTER — OFFICE VISIT (OUTPATIENT)
Dept: BARIATRICS | Facility: CLINIC | Age: 52
End: 2022-10-26
Payer: COMMERCIAL

## 2022-10-26 VITALS
BODY MASS INDEX: 45.99 KG/M2 | SYSTOLIC BLOOD PRESSURE: 134 MMHG | DIASTOLIC BLOOD PRESSURE: 86 MMHG | HEART RATE: 80 BPM | WEIGHT: 293 LBS | HEIGHT: 67 IN

## 2022-10-26 DIAGNOSIS — Z98.84 BARIATRIC SURGERY STATUS: ICD-10-CM

## 2022-10-26 DIAGNOSIS — E66.01 OBESITY, CLASS III, BMI 40-49.9 (MORBID OBESITY) (HCC): Primary | ICD-10-CM

## 2022-10-26 DIAGNOSIS — E66.01 MORBID OBESITY (HCC): Primary | ICD-10-CM

## 2022-10-26 DIAGNOSIS — Z12.11 SCREENING FOR COLON CANCER: ICD-10-CM

## 2022-10-26 PROCEDURE — 99213 OFFICE O/P EST LOW 20 MIN: CPT | Performed by: SURGERY

## 2022-10-26 RX ORDER — SCOLOPAMINE TRANSDERMAL SYSTEM 1 MG/1
1 PATCH, EXTENDED RELEASE TRANSDERMAL ONCE
OUTPATIENT
Start: 2022-10-26 | End: 2022-10-26

## 2022-10-26 RX ORDER — CELECOXIB 200 MG/1
200 CAPSULE ORAL ONCE
OUTPATIENT
Start: 2022-10-26 | End: 2022-10-26

## 2022-10-26 RX ORDER — HEPARIN SODIUM 5000 [USP'U]/ML
5000 INJECTION, SOLUTION INTRAVENOUS; SUBCUTANEOUS
OUTPATIENT
Start: 2022-10-27 | End: 2022-10-28

## 2022-10-26 RX ORDER — ACETAMINOPHEN 325 MG/1
975 TABLET ORAL ONCE
OUTPATIENT
Start: 2022-10-26 | End: 2022-10-26

## 2022-10-26 RX ORDER — OXYCODONE HYDROCHLORIDE 5 MG/1
5 TABLET ORAL EVERY 4 HOURS PRN
Qty: 10 TABLET | Refills: 0 | Status: SHIPPED | OUTPATIENT
Start: 2022-10-26

## 2022-10-26 RX ORDER — PANTOPRAZOLE SODIUM 40 MG/1
40 TABLET, DELAYED RELEASE ORAL DAILY
Qty: 90 TABLET | Refills: 1 | Status: SHIPPED | OUTPATIENT
Start: 2022-10-26

## 2022-10-26 RX ORDER — GABAPENTIN 100 MG/1
600 CAPSULE ORAL ONCE
OUTPATIENT
Start: 2022-10-26 | End: 2022-10-26

## 2022-10-26 NOTE — H&P
H&P Exam - Bariatric Surgery   Snehal Wallace 47 y o  female MRN: [id number]   Encounter: [id number]      HPI:    47 y o  yo morbidly obese female found to be a good candidate to undergo a weight loss operation upon being enrolled here at the [facility]   She has been pre certified to undergo a Laparoscopic stella-en-y gastric bypass possible sleeve gastrectomy  ++Suffers from BRAEDEN , HTN, HLD  S/p  (Pfannenstiel)    Here today to review her pre op test results  Has been medically cleared for the procedure  I have explained our Enhanced Recovery After Bariatric Surgery (ERABS) protocol and benefits including preoperative, intraoperative and postoperative elements  I have discussed with her at length the risks and benefits of the operation and reiterated the components of our multidisciplinary program and the importance of compliance and follow up in the post operative period  Although there is a great statistical chance of improvement or even resolution of most of her associated comorbidities, the results vary from patient to patient and they largely depend on his commitment  The patient was also instructed with regards to the importance of behavior modification, nutritional counseling, support meeting attendance and lifestyle changes that are important to ensure success  She was given the opportunity to ask questions and I have answered all of them  I have addressed with the patient the level of CODE STATUS for this hospital stay and after explaining the different options currently she wishes to be a Level I  She understands and wishes to proceed  She has lost all the weight required prior to surgery  Review of Systems   Constitutional: Negative  Respiratory: Negative  Cardiovascular: Negative  Gastrointestinal: Negative  Musculoskeletal: Negative  Neurological: Negative  All other systems reviewed and are negative        Historical Information   Past Medical History:   Diagnosis Date   • Asthma    • Broken tooth     upper right-back   • High cholesterol    • Hypertension    • Morbid obesity with BMI of 45 0-49 9, adult (HCC)    • Pain in right knee     MRI on 22     Past Surgical History:   Procedure Laterality Date   •  SECTION     • EGD  2022    food in the stomach-repeat EGD on 22   • LAPAROSCOPY     • [place] KNEE SCOPE,MED/LAT MENISECTOMY Right 2022    Procedure: KNEE ARTHROSCOPY  MEDIAL MENISCECTOMY AND CHONDRYLPLASTY;  Surgeon: Stephy Desouza MD;  Location: [facility] OR;  Service: Orthopedics   • TONSILLECTOMY     • WISDOM TOOTH EXTRACTION       Social History   Social History     Substance and Sexual Activity   Alcohol Use Yes    Comment: rare     Social History     Substance and Sexual Activity   Drug Use Never     Social History     Tobacco Use   Smoking Status Never Smoker   Smokeless Tobacco Never Used     Family History: non-contributory    Meds/Allergies   all medications and allergies reviewed  No Known Allergies    Objective     Current Vitals:   Blood Pressure: 134/86 (10/26/22 1400)  Pulse: 80 (10/26/22 1400)  Height: 5' 7" (170 2 cm) (10/26/22 1400)  Weight - Scale: (!) 138 kg (303 lb 12 8 oz) (10/26/22 1400)        Physical Exam  Vitals reviewed  Constitutional:       Appearance: She is well-developed  HENT:      Head: Normocephalic  Eyes:      Extraocular Movements: Extraocular movements intact  Cardiovascular:      Rate and Rhythm: Normal rate  Heart sounds: Normal heart sounds  Pulmonary:      Effort: Pulmonary effort is normal       Breath sounds: Normal breath sounds  Abdominal:      General: There is no distension  Musculoskeletal:         General: Normal range of motion  Cervical back: Normal range of motion  Skin:     General: Skin is warm and dry  Neurological:      Mental Status: She is alert and oriented to person, place, and time     Psychiatric: Mood and Affect: Mood normal          Behavior: Behavior normal          Thought Content: Thought content normal          Judgment: Judgment normal          Lab Results: I have personally reviewed pertinent lab results  Imaging: I have personally reviewed pertinent reports  EKG, Pathology, and Other Studies: I have personally reviewed pertinent reports  Code Status: Level 1    Assessment:  47 y o  yo morbidly obese female found to be a good candidate to undergo a weight loss operation upon being enrolled here at the [address] [place] [address]  She has completed all of the preoperative process for bariatric surgery  Plan:  - Plan for laparoscopic possible open RYGB poss SG with intraoperative EGD  - I discussed the risk of rescheduling/canceling the case if goal weight not met    - consent signed  - preop orders placed

## 2022-10-26 NOTE — PROGRESS NOTES
H&P Exam - Bariatric Surgery   Jose Palm 47 y o  female MRN: [id number]   Encounter: [id number]      HPI:    47 y o  yo morbidly obese female found to be a good candidate to undergo a weight loss operation upon being enrolled here at the [facility]   She has been pre certified to undergo a Laparoscopic stella-en-y gastric bypass possible sleeve gastrectomy  ++Suffers from BRAEDEN , HTN, HLD  S/p  (Pfannenstiel)    Here today to review her pre op test results  Has been medically cleared for the procedure  I have explained our Enhanced Recovery After Bariatric Surgery (ERABS) protocol and benefits including preoperative, intraoperative and postoperative elements  I have discussed with her at length the risks and benefits of the operation and reiterated the components of our multidisciplinary program and the importance of compliance and follow up in the post operative period  Although there is a great statistical chance of improvement or even resolution of most of her associated comorbidities, the results vary from patient to patient and they largely depend on his commitment  The patient was also instructed with regards to the importance of behavior modification, nutritional counseling, support meeting attendance and lifestyle changes that are important to ensure success  She was given the opportunity to ask questions and I have answered all of them  I have addressed with the patient the level of CODE STATUS for this hospital stay and after explaining the different options currently she wishes to be a Level I  She understands and wishes to proceed  She has lost all the weight required prior to surgery  Review of Systems   Constitutional: Negative  Respiratory: Negative  Cardiovascular: Negative  Gastrointestinal: Negative  Musculoskeletal: Negative  Neurological: Negative  All other systems reviewed and are negative        Historical Information   Past Medical History:   Diagnosis Date   • Asthma    • Broken tooth     upper right-back   • High cholesterol    • Hypertension    • Morbid obesity with BMI of 45 0-49 9, adult (HCC)    • Pain in right knee     MRI on 22     Past Surgical History:   Procedure Laterality Date   •  SECTION     • EGD  2022    food in the stomach-repeat EGD on 22   • LAPAROSCOPY     • [place] KNEE SCOPE,MED/LAT MENISECTOMY Right 2022    Procedure: KNEE ARTHROSCOPY  MEDIAL MENISCECTOMY AND CHONDRYLPLASTY;  Surgeon: Jada Persaud MD;  Location: [facility] OR;  Service: Orthopedics   • TONSILLECTOMY     • WISDOM TOOTH EXTRACTION       Social History   Social History     Substance and Sexual Activity   Alcohol Use Yes    Comment: rare     Social History     Substance and Sexual Activity   Drug Use Never     Social History     Tobacco Use   Smoking Status Never Smoker   Smokeless Tobacco Never Used     Family History: non-contributory    Meds/Allergies   all medications and allergies reviewed  No Known Allergies    Objective     Current Vitals:   Blood Pressure: 134/86 (10/26/22 1400)  Pulse: 80 (10/26/22 1400)  Height: 5' 7" (170 2 cm) (10/26/22 1400)  Weight - Scale: (!) 138 kg (303 lb 12 8 oz) (10/26/22 1400)        Physical Exam  Vitals reviewed  Constitutional:       Appearance: She is well-developed  HENT:      Head: Normocephalic  Eyes:      Extraocular Movements: Extraocular movements intact  Cardiovascular:      Rate and Rhythm: Normal rate  Heart sounds: Normal heart sounds  Pulmonary:      Effort: Pulmonary effort is normal       Breath sounds: Normal breath sounds  Abdominal:      General: There is no distension  Musculoskeletal:         General: Normal range of motion  Cervical back: Normal range of motion  Skin:     General: Skin is warm and dry  Neurological:      Mental Status: She is alert and oriented to person, place, and time     Psychiatric: Mood and Affect: Mood normal          Behavior: Behavior normal          Thought Content: Thought content normal          Judgment: Judgment normal          Lab Results: I have personally reviewed pertinent lab results  Imaging: I have personally reviewed pertinent reports  EKG, Pathology, and Other Studies: I have personally reviewed pertinent reports  Code Status: Level 1    Assessment:  47 y o  yo morbidly obese female found to be a good candidate to undergo a weight loss operation upon being enrolled here at the [address] [place] [address]  She has completed all of the preoperative process for bariatric surgery  Plan:  - Plan for laparoscopic possible open RYGB poss SG with intraoperative EGD  - I discussed the risk of rescheduling/canceling the case if goal weight not met    - consent signed  - preop orders placed

## 2022-11-13 ENCOUNTER — HOSPITAL ENCOUNTER (EMERGENCY)
Facility: HOSPITAL | Age: 52
Discharge: HOME | End: 2022-11-13
Attending: EMERGENCY MEDICINE

## 2022-11-13 VITALS
HEIGHT: 67 IN | TEMPERATURE: 99.6 F | OXYGEN SATURATION: 97 % | DIASTOLIC BLOOD PRESSURE: 94 MMHG | SYSTOLIC BLOOD PRESSURE: 160 MMHG | RESPIRATION RATE: 20 BRPM | WEIGHT: 293 LBS | HEART RATE: 103 BPM | BODY MASS INDEX: 45.99 KG/M2

## 2022-11-13 DIAGNOSIS — J32.9 SINUSITIS: Primary | ICD-10-CM

## 2022-11-13 DIAGNOSIS — R68.89 FLU-LIKE SYMPTOMS: ICD-10-CM

## 2022-11-13 LAB
FLUAV RNA RESP QL NAA+PROBE: NEGATIVE
FLUBV RNA RESP QL NAA+PROBE: NEGATIVE
RSV RNA RESP QL NAA+PROBE: NEGATIVE
SARS-COV-2 RNA RESP QL NAA+PROBE: NEGATIVE

## 2022-11-13 RX ORDER — AMOXICILLIN AND CLAVULANATE POTASSIUM 875; 125 MG/1; MG/1
1 TABLET, FILM COATED ORAL EVERY 12 HOURS SCHEDULED
Qty: 20 TABLET | Refills: 0 | Status: SHIPPED | OUTPATIENT
Start: 2022-11-13 | End: 2022-11-23

## 2022-11-13 RX ORDER — PREDNISONE 20 MG/1
40 TABLET ORAL DAILY
Qty: 10 TABLET | Refills: 0 | Status: SHIPPED | OUTPATIENT
Start: 2022-11-13 | End: 2022-11-18

## 2022-11-13 RX ORDER — ALBUTEROL SULFATE 90 UG/1
2 AEROSOL, METERED RESPIRATORY (INHALATION) EVERY 6 HOURS PRN
Qty: 18 G | Refills: 0 | Status: SHIPPED | OUTPATIENT
Start: 2022-11-13 | End: 2022-12-13

## 2022-11-13 NOTE — ED PROVIDER NOTES
History  Chief Complaint   Patient presents with   • Flu Symptoms     Pt c/o flu like symptoms and chest tightness     65-year-old female presenting today with a week of flu-like symptoms accompanied with cough, congestion, generalized body aches and fatigue  States that she was feeling better however then yesterday worsened and now has a fever of 101 at home with worsening nasal congestion facial pain and pressure  And headache  Has a long history of asthma and feels as though it that may be worsening with some chest tightness  Denies nausea vomiting diarrhea, shortness of breath, calf pain or swelling  Prior to Admission Medications   Prescriptions Last Dose Informant Patient Reported? Taking?    amLODIPine (NORVASC) 5 mg tablet   No No   Sig: take 1 tablet by mouth once daily   aspirin (ECOTRIN) 325 mg EC tablet  Self No No   Sig: Take 1 tablet (325 mg total) by mouth daily   ibuprofen (MOTRIN) 200 mg tablet   Yes No   Sig: Take 600 mg by mouth every 6 (six) hours as needed for mild pain Last dose 6/28/22   Patient not taking: No sig reported   lisinopril (ZESTRIL) 10 mg tablet   No No   Sig: take 1 tablet by mouth once daily   metFORMIN (GLUCOPHAGE) 500 mg tablet  Self No No   Sig: Take 1 tablet (500 mg total) by mouth daily with breakfast   metoprolol succinate (TOPROL-XL) 50 mg 24 hr tablet   No No   Sig: take 1 tablet by mouth once daily   oxyCODONE (Roxicodone) 5 immediate release tablet   No No   Sig: Take 1 tablet (5 mg total) by mouth every 4 (four) hours as needed for moderate pain Max Daily Amount: 30 mg   pantoprazole (PROTONIX) 40 mg tablet   No No   Sig: Take 1 tablet (40 mg total) by mouth daily   rosuvastatin (CRESTOR) 40 MG tablet  Self No No   Sig: Take 1 tablet (40 mg total) by mouth daily   vitamin E, tocopherol, 400 units capsule   Yes No   Sig: Take 400 Units by mouth daily Last dose 6/27/22   Patient not taking: No sig reported      Facility-Administered Medications: None Past Medical History:   Diagnosis Date   • Asthma    • Broken tooth     upper right-back   • High cholesterol    • Hypertension    • Morbid obesity with BMI of 45 0-49 9, adult (HCC)    • Pain in right knee     MRI on 22       Past Surgical History:   Procedure Laterality Date   •  SECTION     • EGD  2022    food in the stomach-repeat EGD on 22   • LAPAROSCOPY     • [place] KNEE SCOPE,MED/LAT MENISECTOMY Right 2022    Procedure: KNEE ARTHROSCOPY  MEDIAL MENISCECTOMY AND CHONDRYLPLASTY;  Surgeon: Natan Easton MD;  Location: [facility];  Service: Orthopedics   • TONSILLECTOMY     • WISDOM TOOTH EXTRACTION         Family History   Problem Relation Age of Onset   • Hypertension Mother    • Stroke Mother    • Diabetes Father    • Hypertension Father    • Heart disease Father    • Thyroid disease Neg Hx      I have reviewed and agree with the history as documented  E-Cigarette/Vaping   • E-Cigarette Use Never User      E-Cigarette/Vaping Substances   • Nicotine No    • THC No    • CBD No    • Flavoring No    • Other No    • Unknown No      Social History     Tobacco Use   • Smoking status: Never Smoker   • Smokeless tobacco: Never Used   Vaping Use   • Vaping Use: Never used   Substance Use Topics   • Alcohol use: Yes     Comment: rare   • Drug use: Never       Review of Systems   Constitutional: Positive for fatigue and fever  Negative for activity change, appetite change, chills, diaphoresis and unexpected weight change  HENT: Positive for congestion, sinus pressure and sinus pain  Negative for dental problem, drooling, ear discharge, ear pain, facial swelling, hearing loss, mouth sores, nosebleeds, postnasal drip, rhinorrhea, sneezing, sore throat, tinnitus, trouble swallowing and voice change  Eyes: Negative  Respiratory: Positive for cough and chest tightness  Negative for apnea, choking, shortness of breath, wheezing and stridor  Cardiovascular: Negative  Gastrointestinal: Negative  Endocrine: Negative  Genitourinary: Negative  Musculoskeletal: Negative  Skin: Negative  Allergic/Immunologic: Negative  Neurological: Positive for headaches  Negative for dizziness, tremors, seizures, syncope, facial asymmetry, speech difficulty, weakness, light-headedness and numbness  Hematological: Negative  Psychiatric/Behavioral: Negative  All other systems reviewed and are negative  Physical Exam  Physical Exam  Vitals and nursing note reviewed  Constitutional:       General: She is not in acute distress  Appearance: She is well-developed  She is not diaphoretic  HENT:      Head: Normocephalic and atraumatic  Right Ear: Tympanic membrane, ear canal and external ear normal       Left Ear: Tympanic membrane, ear canal and external ear normal       Nose: Nose normal       Mouth/Throat:      Mouth: Mucous membranes are moist       Pharynx: Oropharynx is clear  No oropharyngeal exudate  Eyes:      General: No scleral icterus  Right eye: No discharge  Left eye: No discharge  Conjunctiva/sclera: Conjunctivae normal       Pupils: Pupils are equal, round, and reactive to light  Cardiovascular:      Rate and Rhythm: Normal rate and regular rhythm  Pulses: Normal pulses  Heart sounds: Normal heart sounds  No murmur heard  No friction rub  No gallop  Pulmonary:      Effort: Pulmonary effort is normal  No respiratory distress  Breath sounds: Normal breath sounds  No stridor  No wheezing, rhonchi or rales  Comments: S PO2 is 97% indicating adequate oxygenation  Chest:      Chest wall: No tenderness  Abdominal:      General: Abdomen is flat  Bowel sounds are normal  There is no distension  Palpations: Abdomen is soft  There is no mass  Tenderness: There is no abdominal tenderness  There is no guarding or rebound  Hernia: No hernia is present     Musculoskeletal:      Cervical back: Normal range of motion and neck supple  Lymphadenopathy:      Cervical: No cervical adenopathy  Skin:     General: Skin is warm and dry  Capillary Refill: Capillary refill takes less than 2 seconds  Coloration: Skin is not pale  Findings: No erythema or rash  Neurological:      General: No focal deficit present  Mental Status: She is alert and oriented to person, place, and time  Mental status is at baseline  Vital Signs  ED Triage Vitals [11/13/22 1021]   Temperature Pulse Respirations Blood Pressure SpO2   99 6 °F (37 6 °C) 103 20 160/94 97 %      Temp Source Heart Rate Source Patient Position - Orthostatic VS BP Location FiO2 (%)   Tympanic Monitor Sitting Left arm --      Pain Score       No Pain           Vitals:    11/13/22 1021   BP: 160/94   Pulse: 103   Patient Position - Orthostatic VS: Sitting         Visual Acuity      ED Medications  Medications - No data to display    Diagnostic Studies  Results Reviewed     Procedure Component Value Units Date/Time    FLU/RSV/COVID - if FLU/RSV clinically relevant [[id number]]  (Normal) Collected: 11/13/22 1115    Lab Status: Final result Specimen: Nares from Nose Updated: 11/13/22 1233     SARS-CoV-2 Negative     INFLUENZA A PCR Negative     INFLUENZA B PCR Negative     RSV PCR Negative    Narrative:      FOR PEDIATRIC PATIENTS - copy/paste COVID Guidelines URL to browser: [email or web address]  [employer]    SARS-CoV-2 assay is a Nucleic Acid Amplification assay intended for the  qualitative detection of nucleic acid from SARS-CoV-2 in nasopharyngeal  swabs  Results are for the presumptive identification of SARS-CoV-2 RNA  Positive results are indicative of infection with SARS-CoV-2, the virus  causing COVID-19, but do not rule out bacterial infection or co-infection  with other viruses   Laboratories within the [place] and its  territories are required to report all positive results to the appropriate  public health authorities  Negative results do not preclude SARS-CoV-2  infection and should not be used as the sole basis for treatment or other  patient management decisions  Negative results must be combined with  clinical observations, patient history, and epidemiological information  This test has not been FDA cleared or approved  This test has been authorized by FDA under an Emergency Use Authorization  (EUA)  This test is only authorized for the duration of time the  declaration that circumstances exist justifying the authorization of the  emergency use of an in vitro diagnostic tests for detection of SARS-CoV-2  virus and/or diagnosis of COVID-19 infection under section 564(b)(1) of  the Act, 21 U  S C  679SGB-6(N)(9), unless the authorization is terminated  or revoked sooner  The test has been validated but independent review by FDA  and CLIA is pending  Test performed using [employer] GeneXpert: This RT-PCR assay targets N2,  a region unique to SARS-CoV-2  A conserved region in the E-gene was chosen  for pan-Sarbecovirus detection which includes SARS-CoV-2  According to CMS-2020-01-R, this platform meets the definition of high-throughput technology  No orders to display              Procedures  Procedures         ED Course                               SBIRT 20yo+    Flowsheet Row Most Recent Value   SBIRT (25 yo +)    In order to provide better care to our patients, we are screening all of our patients for alcohol and drug use  Would it be okay to ask you these screening questions? No Filed at: 11/13/2022 1122                    MDM  Number of Diagnoses or Management Options  Flu-like symptoms  Sinusitis  Diagnosis management comments: Suspect sinusitis given time frame and now fevers with worsening facial pain and pressure and headaches       Patient is informed to return to the emergency department for worsening of symptoms and was given proper education regarding their diagnosis and symptoms  Otherwise the patient is informed to follow up with their primary care doctor for re-evaluation  The patient verbalizes understanding and agrees with above assessment and plan  All questions were answered  Please Note: Flu[employer] Direct voice recognition software may have been used in the creation of this document  Wrong words or sound a like substitutions may have occurred due to the inherent limitations of the voice software  Amount and/or Complexity of Data Reviewed  Review and summarize past medical records: yes  Independent visualization of images, tracings, or specimens: yes        Disposition  Final diagnoses:   Sinusitis   Flu-like symptoms     Time reflects when diagnosis was documented in both MDM as applicable and the Disposition within this note     Time User Action Codes Description Comment    11/13/2022 12:07 PM Townsend Schlatter Add [J32 9] Sinusitis     11/13/2022 12:07 PM Townsend Schlatter Add [R68 89] Flu-like symptoms       ED Disposition     ED Disposition   Discharge    Condition   Stable    Date/Time   Sun Nov 13, 2022 12:07 PM    Comment   Malik Pompa discharge to home/self care                 Follow-up Information     Follow up With Specialties Details Why Contact Info Additional [address] Emergency Department Emergency Medicine Go to  If symptoms worsen, otherwise please follow up with your family doctor [address] [place] [address] [address] [address] [address] [address] Emergency Department, [facility], [id number]          Discharge Medication List as of 11/13/2022 12:08 PM      START taking these medications    Details   albuterol (PROVENTIL HFA,VENTOLIN HFA) 90 mcg/act inhaler Inhale 2 puffs every 6 (six) hours as needed for wheezing or shortness of breath, Starting Sun 11/13/2022, Until Tue 12/13/2022 at 2359, Normal      amoxicillin-clavulanate (AUGMENTIN) 875-125 mg per tablet Take 1 tablet by mouth every 12 (twelve) hours for 10 days, Starting Sun 11/13/2022, Until Wed 11/23/2022, Normal      predniSONE 20 mg tablet Take 2 tablets (40 mg total) by mouth daily for 5 days, Starting Sun 11/13/2022, Until Fri 11/18/2022, Normal         CONTINUE these medications which have NOT CHANGED    Details   amLODIPine (NORVASC) 5 mg tablet take 1 tablet by mouth once daily, Normal      aspirin (ECOTRIN) 325 mg EC tablet Take 1 tablet (325 mg total) by mouth daily, Starting Tue 7/5/2022, Normal      ibuprofen (MOTRIN) 200 mg tablet Take 600 mg by mouth every 6 (six) hours as needed for mild pain Last dose 6/28/22, Historical Med      lisinopril (ZESTRIL) 10 mg tablet take 1 tablet by mouth once daily, Normal      metFORMIN (GLUCOPHAGE) 500 mg tablet Take 1 tablet (500 mg total) by mouth daily with breakfast, Starting Tue 6/28/2022, Normal      metoprolol succinate (TOPROL-XL) 50 mg 24 hr tablet take 1 tablet by mouth once daily, Normal      oxyCODONE (Roxicodone) 5 immediate release tablet Take 1 tablet (5 mg total) by mouth every 4 (four) hours as needed for moderate pain Max Daily Amount: 30 mg, Starting Wed 10/26/2022, Normal      pantoprazole (PROTONIX) 40 mg tablet Take 1 tablet (40 mg total) by mouth daily, Starting Wed 10/26/2022, Normal      rosuvastatin (CRESTOR) 40 MG tablet Take 1 tablet (40 mg total) by mouth daily, Starting Tue 6/28/2022, Normal      vitamin E, tocopherol, 400 units capsule Take 400 Units by mouth daily Last dose 6/27/22, Historical Med             No discharge procedures on file      PDMP Review       Value Time User    PDMP Reviewed  Yes 10/26/2022  4:04 PM Bev Nava MD          ED Provider  Electronically Signed by           Zana Monet PA-C  11/13/22 1412

## 2022-12-12 ENCOUNTER — OFFICE VISIT (OUTPATIENT)
Dept: BARIATRICS | Facility: CLINIC | Age: 52
End: 2022-12-12

## 2022-12-12 DIAGNOSIS — E66.01 OBESITY, CLASS III, BMI 40-49.9 (MORBID OBESITY) (HCC): Primary | ICD-10-CM

## 2022-12-12 NOTE — PROGRESS NOTES
Patient's name and  were verified during visit  Provider explained that [employer] is a HIPPA compliant platform and to further protect their confidentiality the provider was alone and the office door was closed  Patient consented to the virtual video visit  This visit is free  Patient presents for pre-op check in  Eating behaviors/food choices: Continues structured eating, having protein shake for breakfast and two healthy meals a day  Not doing any mindless or emotional snacking, she is being conscientious of portions  Encouraged continued mindfulness along with positive relationship with foods, being aware of intake and why she is choosing the foods she is  Activity/Exercise:  Patient hasn't been very active due to respiratory infection a little bit ago, she was walking but with the colder weather and asthma she hasn't been  She did find workout videos on her smart TV however that she did try and will continue  Encouraged to incorporate strength training at some time  Sleep/Rest:  Patient denies any issues with rest, not eating in response to fatigue  Mental Health/Wellness:  Patient is managing holiday stress effectively, she does notices in the past she would have larger portions at meals in response to stress but she is practicing mindfulness  She is excited to have surgery, she was disappointed when it needed to be rescheduled but she is managing this well  Her family is visiting for the holiday so she is aware of the potential for more food consumption but she has worked through a plan to manage this      Goals:    - continue healthy eating habits, be mindful of portions especially when stressed  - consider adding in strength training     Next Appointment:  With PA on 1/3

## 2022-12-21 DIAGNOSIS — E11.9 NEW ONSET TYPE 2 DIABETES MELLITUS (HCC): ICD-10-CM

## 2022-12-21 DIAGNOSIS — E78.2 MIXED HYPERLIPIDEMIA: ICD-10-CM

## 2022-12-21 RX ORDER — ROSUVASTATIN CALCIUM 40 MG/1
TABLET, COATED ORAL
Qty: 90 TABLET | Refills: 1 | Status: SHIPPED | OUTPATIENT
Start: 2022-12-21

## 2023-01-04 DIAGNOSIS — I10 ESSENTIAL HYPERTENSION: ICD-10-CM

## 2023-01-04 DIAGNOSIS — Z12.11 SCREENING FOR COLON CANCER: Primary | ICD-10-CM

## 2023-01-09 NOTE — H&P (VIEW-ONLY)
H&P Exam - Bariatric Surgery   Ramon Raymundo 47 y o  female MRN: [id number]  Unit/Bed#:  Encounter: [id number]      HPI:    47 y o  yo female with morbid obesity found to be a good candidate to undergo a weight loss operation upon being enrolled here at the [facility]   She has been pre certified to undergo a Laparoscopic Shaila-en-Y Gastric Bypass  Here today to review her pre op test results  Has been medically cleared for the procedure  ++Suffers from BRAEDEN (mild - no CPAP), HTN - amlodipine and metoprolol, HLD  S/p  (Pfannenstiel)    I have discussed with her at length the risks and benefits of the operation and reiterated the components of our multidisciplinary program and the importance of compliance and follow up in the post operative period  Although there is a great statistical chance of improvement or even resolution of most of her associated comorbidities, the results vary from patient to patient and they largely depend on his commitment  The patient was also instructed with regards to the importance of behavior modification, nutritional counseling, support meeting attendance and lifestyle changes that are important to ensure success  She was given the opportunity to ask questions and I have answered all of them  I have addressed with the patient the level of CODE STATUS for this hospital stay and after explaining the different options currently she wishes to be a Level I  She understands and wishes to proceed  She has lost all the weight required prior to surgery  Review of Systems   Constitutional: Negative for chills and fever  Unexpected weight change: planned weight loss  HENT: Negative for trouble swallowing  Respiratory: Negative for cough and shortness of breath  Cardiovascular: Negative for chest pain and palpitations  Gastrointestinal: Negative for abdominal pain, constipation, diarrhea, nausea and vomiting     Neurological: Negative for dizziness  Psychiatric/Behavioral:        Denies anxiety and depression       Historical Information   Past Medical History:   Diagnosis Date   • Asthma    • Broken tooth     upper right-back   • High cholesterol    • Hypertension    • Morbid obesity with BMI of 45 0-49 9, adult (HCC)    • Pain in right knee     MRI on 22     Past Surgical History:   Procedure Laterality Date   •  SECTION     • EGD  2022    food in the stomach-repeat EGD on 22   • LAPAROSCOPY     • SD ARTHRS KNE SURG W/MENISCECTOMY MED/LAT W/SHVG Right 2022    Procedure: KNEE ARTHROSCOPY  MEDIAL MENISCECTOMY AND CHONDRYLPLASTY;  Surgeon: Julieta Melendez MD;  Location: [address] [place] [address];  Service: Orthopedics   • TONSILLECTOMY     • WISDOM TOOTH EXTRACTION       Social History   Social History     Substance and Sexual Activity   Alcohol Use Yes    Comment: rare     Social History     Substance and Sexual Activity   Drug Use Never     Social History     Tobacco Use   Smoking Status Never   Smokeless Tobacco Never     Family History: non-contributory    Meds/Allergies   all medications and allergies reviewed  No Known Allergies    Objective     Current Vitals:   Blood Pressure: 124/78 (23 1107)  Pulse: 95 (23 1107)  Height: 5' 7" (170 2 cm) (23 1107)  Weight - Scale: 131 kg (288 lb 12 8 oz) (23 1107)      Invasive Devices     None                 Physical Exam  Vitals reviewed  Constitutional:       General: She is not in acute distress  Appearance: She is well-developed  She is obese  HENT:      Head: Normocephalic and atraumatic  Eyes:      General: No scleral icterus  Cardiovascular:      Rate and Rhythm: Normal rate and regular rhythm  Heart sounds: Normal heart sounds  Pulmonary:      Effort: Pulmonary effort is normal  No respiratory distress  Breath sounds: Normal breath sounds  Abdominal:      General: Bowel sounds are normal  There is no distension  Palpations: Abdomen is soft  Tenderness: There is no abdominal tenderness  Skin:     General: Skin is warm and dry  Neurological:      Mental Status: She is alert and oriented to person, place, and time  Psychiatric:         Mood and Affect: Mood normal          Behavior: Behavior normal          Lab Results: I have personally reviewed pertinent lab results  Imaging: I have personally reviewed pertinent reports  EKG, Pathology, and Other Studies: I have personally reviewed pertinent reports  Code Status: Level 1    Assessment:  47 y o  yo female with morbid obesity found to be a good candidate to undergo a weight loss operation upon being enrolled here at the [facility]  She has completed all of the preoperative process for bariatric surgery      Plan:  - Plan for laparoscopic possible open Shaila-en-Y Gastric Bypass with intraoperative EGD  - consent signed  - preop orders placed

## 2023-01-09 NOTE — PROGRESS NOTES
H&P Exam - Bariatric Surgery   Emilie Velazquez 47 y o  female MRN: [id number]  Unit/Bed#:  Encounter: [id number]      HPI:    47 y o  yo female with morbid obesity found to be a good candidate to undergo a weight loss operation upon being enrolled here at the [facility]   She has been pre certified to undergo a Laparoscopic Shaila-en-Y Gastric Bypass  Here today to review her pre op test results  Has been medically cleared for the procedure  ++Suffers from BRAEDEN (mild - no CPAP), HTN - amlodipine and metoprolol, HLD  S/p  (Pfannenstiel)    I have discussed with her at length the risks and benefits of the operation and reiterated the components of our multidisciplinary program and the importance of compliance and follow up in the post operative period  Although there is a great statistical chance of improvement or even resolution of most of her associated comorbidities, the results vary from patient to patient and they largely depend on his commitment  The patient was also instructed with regards to the importance of behavior modification, nutritional counseling, support meeting attendance and lifestyle changes that are important to ensure success  She was given the opportunity to ask questions and I have answered all of them  I have addressed with the patient the level of CODE STATUS for this hospital stay and after explaining the different options currently she wishes to be a Level I  She understands and wishes to proceed  She has lost all the weight required prior to surgery  Review of Systems   Constitutional: Negative for chills and fever  Unexpected weight change: planned weight loss  HENT: Negative for trouble swallowing  Respiratory: Negative for cough and shortness of breath  Cardiovascular: Negative for chest pain and palpitations  Gastrointestinal: Negative for abdominal pain, constipation, diarrhea, nausea and vomiting     Neurological: Negative for dizziness  Psychiatric/Behavioral:        Denies anxiety and depression       Historical Information   Past Medical History:   Diagnosis Date   • Asthma    • Broken tooth     upper right-back   • High cholesterol    • Hypertension    • Morbid obesity with BMI of 45 0-49 9, adult (HCC)    • Pain in right knee     MRI on 22     Past Surgical History:   Procedure Laterality Date   •  SECTION     • EGD  2022    food in the stomach-repeat EGD on 22   • LAPAROSCOPY     • MA ARTHRS KNE SURG W/MENISCECTOMY MED/LAT W/SHVG Right 2022    Procedure: KNEE ARTHROSCOPY  MEDIAL MENISCECTOMY AND CHONDRYLPLASTY;  Surgeon: Lona Chappell MD;  Location: [address] [place] [address];  Service: Orthopedics   • TONSILLECTOMY     • WISDOM TOOTH EXTRACTION       Social History   Social History     Substance and Sexual Activity   Alcohol Use Yes    Comment: rare     Social History     Substance and Sexual Activity   Drug Use Never     Social History     Tobacco Use   Smoking Status Never   Smokeless Tobacco Never     Family History: non-contributory    Meds/Allergies   all medications and allergies reviewed  No Known Allergies    Objective     Current Vitals:   Blood Pressure: 124/78 (23 1107)  Pulse: 95 (23 1107)  Height: 5' 7" (170 2 cm) (23 1107)  Weight - Scale: 131 kg (288 lb 12 8 oz) (23 110)      Invasive Devices     None                 Physical Exam  Vitals reviewed  Constitutional:       General: She is not in acute distress  Appearance: She is well-developed  She is obese  HENT:      Head: Normocephalic and atraumatic  Eyes:      General: No scleral icterus  Cardiovascular:      Rate and Rhythm: Normal rate and regular rhythm  Heart sounds: Normal heart sounds  Pulmonary:      Effort: Pulmonary effort is normal  No respiratory distress  Breath sounds: Normal breath sounds  Abdominal:      General: Bowel sounds are normal  There is no distension  Palpations: Abdomen is soft  Tenderness: There is no abdominal tenderness  Skin:     General: Skin is warm and dry  Neurological:      Mental Status: She is alert and oriented to person, place, and time  Psychiatric:         Mood and Affect: Mood normal          Behavior: Behavior normal          Lab Results: I have personally reviewed pertinent lab results  Imaging: I have personally reviewed pertinent reports  EKG, Pathology, and Other Studies: I have personally reviewed pertinent reports  Code Status: Level 1    Assessment:  47 y o  yo female with morbid obesity found to be a good candidate to undergo a weight loss operation upon being enrolled here at the [facility]  She has completed all of the preoperative process for bariatric surgery      Plan:  - Plan for laparoscopic possible open Shaila-en-Y Gastric Bypass with intraoperative EGD  - consent signed  - preop orders placed

## 2023-01-12 ENCOUNTER — TELEPHONE (OUTPATIENT)
Dept: BARIATRICS | Facility: CLINIC | Age: 53
End: 2023-01-12

## 2023-01-12 DIAGNOSIS — Z12.11 SCREENING FOR COLON CANCER: Primary | ICD-10-CM

## 2023-01-13 ENCOUNTER — OFFICE VISIT (OUTPATIENT)
Dept: BARIATRICS | Facility: CLINIC | Age: 53
End: 2023-01-13

## 2023-01-13 VITALS
WEIGHT: 288.8 LBS | BODY MASS INDEX: 45.33 KG/M2 | DIASTOLIC BLOOD PRESSURE: 78 MMHG | HEART RATE: 95 BPM | HEIGHT: 67 IN | SYSTOLIC BLOOD PRESSURE: 124 MMHG

## 2023-01-13 DIAGNOSIS — I10 ESSENTIAL HYPERTENSION: ICD-10-CM

## 2023-01-13 DIAGNOSIS — G47.33 OSA (OBSTRUCTIVE SLEEP APNEA): ICD-10-CM

## 2023-01-13 DIAGNOSIS — E11.9 NEW ONSET TYPE 2 DIABETES MELLITUS (HCC): ICD-10-CM

## 2023-01-13 DIAGNOSIS — E66.01 OBESITY, CLASS III, BMI 40-49.9 (MORBID OBESITY) (HCC): Primary | ICD-10-CM

## 2023-01-13 DIAGNOSIS — E78.2 MIXED HYPERLIPIDEMIA: ICD-10-CM

## 2023-01-13 RX ORDER — CELECOXIB 100 MG/1
200 CAPSULE ORAL ONCE
Status: CANCELLED | OUTPATIENT
Start: 2023-01-17 | End: 2023-01-13

## 2023-01-13 RX ORDER — SCOLOPAMINE TRANSDERMAL SYSTEM 1 MG/1
1 PATCH, EXTENDED RELEASE TRANSDERMAL ONCE
Status: CANCELLED | OUTPATIENT
Start: 2023-01-17 | End: 2023-01-13

## 2023-01-13 RX ORDER — HEPARIN SODIUM 5000 [USP'U]/ML
5000 INJECTION, SOLUTION INTRAVENOUS; SUBCUTANEOUS
Status: CANCELLED | OUTPATIENT
Start: 2023-01-17 | End: 2023-01-18

## 2023-01-13 RX ORDER — GABAPENTIN 300 MG/1
300 CAPSULE ORAL ONCE
Status: CANCELLED | OUTPATIENT
Start: 2023-01-17 | End: 2023-01-13

## 2023-01-13 RX ORDER — ACETAMINOPHEN 325 MG/1
975 TABLET ORAL ONCE
Status: CANCELLED | OUTPATIENT
Start: 2023-01-17 | End: 2023-01-13

## 2023-01-13 RX ORDER — METRONIDAZOLE 500 MG/100ML
500 INJECTION, SOLUTION INTRAVENOUS ONCE
Status: CANCELLED | OUTPATIENT
Start: 2023-01-17 | End: 2023-01-13

## 2023-01-13 NOTE — PRE-PROCEDURE INSTRUCTIONS
My Surgical Experience    The following information was developed to assist you to prepare for your operation  What do I need to do before coming to the hospital?  • Arrange for a responsible person to drive you to and from the hospital   • Arrange care for your children at home  Children are not allowed in the recovery areas of the hospital  • Plan to wear clothing that is easy to put on and take off  If you are having shoulder surgery, wear a shirt that buttons or zippers in the front  Bathing  o Shower the evening before and the morning of your surgery with an antibacterial soap  Please refer to the Pre Op Showering Instructions for Surgery Patients Sheet   o Remove nail polish and all body piercing jewelry  o Do not shave any body part for at least 24 hours before surgery-this includes face, arms, legs and upper body  Food  o Nothing to eat or drink after midnight the night before your surgery  This includes candy and chewing gum  o Exception: If your surgery is after 12:00pm (noon), you may have clear liquids such as 7-Up®, ginger ale, apple or cranberry juice, Jell-O®, water, or clear broth until 8:00 am  o Do not drink milk or juice with pulp on the morning before surgery  o Do not drink alcohol 24 hours before surgery  Medicine  o Follow instructions you received from your surgeon about which medicines you may take on the day of surgery  o If instructed to take medicine on the morning of surgery, take pills with just a small sip of water  Call your prescribing doctor for specific infroamtion on what to do if you take insulin    What should I bring to the hospital?    Bring:  • Crutches or a walker, if you have them, for foot or knee surgery  • A list of the daily medicines, vitamins, minerals, herbals and nutritional supplements you take   Include the dosages of medicines and the time you take them each day  • Glasses, dentures or hearing aids  • Minimal clothing; you will be wearing hospital sleepwear  • Photo ID; required to verify your identity  • If you have a Living Will or Power of , bring a copy of the documents  • If you have an ostomy, bring an extra pouch and any supplies you use    Do not bring  • Medicines or inhalers  • Money, valuables or jewelry    What other information should I know about the day of surgery? • Notify your surgeons if you develop a cold, sore throat, cough, fever, rash or any other illness  • Report to the Ambulatory Surgical/Same Day Surgery Unit  • You will be instructed to stop at Registration only if you have not been pre-registered  • Inform your  fi they do not stay that they will be asked by the staff to leave a phone number where they can be reached  • Be available to be reached before surgery  In the event the operating room schedule changes, you may be asked to come in earlier or later than expected    *It is important to tell your doctor and others involved in your health care if you are taking or have been taking any non-prescription drugs, vitamins, minerals, herbals or other nutritional supplements  Any of these may interact with some food or medicines and cause a reaction      Pre-Surgery Instructions:   Medication Instructions   • amLODIPine (NORVASC) 5 mg tablet Take day of surgery  • lisinopril (ZESTRIL) 10 mg tablet Hold day of surgery  • Melatonin 5 MG SUBL Take night before surgery   • metoprolol tartrate (LOPRESSOR) 25 mg tablet Take day of surgery  • oxyCODONE (Roxicodone) 5 immediate release tablet Hold day of surgery  • pantoprazole (PROTONIX) 40 mg tablet Hold day of surgery  • Probiotic Product (PROBIOTIC-10 PO) Hold day of surgery  • rosuvastatin (CRESTOR) 40 MG tablet Hold day of surgery      Follow fasting protocol

## 2023-01-16 ENCOUNTER — ANESTHESIA EVENT (OUTPATIENT)
Dept: PERIOP | Facility: HOSPITAL | Age: 53
End: 2023-01-16

## 2023-01-16 DIAGNOSIS — I10 ESSENTIAL HYPERTENSION: ICD-10-CM

## 2023-01-16 RX ORDER — AMLODIPINE BESYLATE 5 MG/1
TABLET ORAL
Qty: 90 TABLET | Refills: 0 | Status: SHIPPED | OUTPATIENT
Start: 2023-01-16 | End: 2023-01-18

## 2023-01-17 ENCOUNTER — HOSPITAL ENCOUNTER (INPATIENT)
Facility: HOSPITAL | Age: 53
LOS: 1 days | Discharge: HOME | End: 2023-01-18
Attending: SURGERY | Admitting: SURGERY

## 2023-01-17 ENCOUNTER — ANESTHESIA (OUTPATIENT)
Dept: PERIOP | Facility: HOSPITAL | Age: 53
End: 2023-01-17

## 2023-01-17 DIAGNOSIS — L76.82 INCISIONAL PAIN: ICD-10-CM

## 2023-01-17 DIAGNOSIS — E11.9 NEW ONSET TYPE 2 DIABETES MELLITUS (HCC): ICD-10-CM

## 2023-01-17 DIAGNOSIS — S83.241A OTHER TEAR OF MEDIAL MENISCUS, CURRENT INJURY, RIGHT KNEE, INITIAL ENCOUNTER: ICD-10-CM

## 2023-01-17 DIAGNOSIS — I10 ESSENTIAL HYPERTENSION: ICD-10-CM

## 2023-01-17 DIAGNOSIS — G47.33 OSA (OBSTRUCTIVE SLEEP APNEA): Primary | ICD-10-CM

## 2023-01-17 DIAGNOSIS — Z12.11 SCREENING FOR COLON CANCER: Primary | ICD-10-CM

## 2023-01-17 DIAGNOSIS — R11.0 NAUSEA: ICD-10-CM

## 2023-01-17 LAB
GLUCOSE SERPL-MCNC: 156 MG/DL (ref 65–140)
GLUCOSE SERPL-MCNC: 188 MG/DL (ref 65–140)

## 2023-01-17 PROCEDURE — 0D164ZA BYPASS STOMACH TO JEJUNUM, PERCUTANEOUS ENDOSCOPIC APPROACH: ICD-10-PCS | Performed by: SURGERY

## 2023-01-17 PROCEDURE — 0DJ08ZZ INSPECTION OF UPPER INTESTINAL TRACT, VIA NATURAL OR ARTIFICIAL OPENING ENDOSCOPIC: ICD-10-PCS | Performed by: SURGERY

## 2023-01-17 RX ORDER — ONDANSETRON 2 MG/ML
4 INJECTION INTRAMUSCULAR; INTRAVENOUS ONCE AS NEEDED
Status: DISCONTINUED | OUTPATIENT
Start: 2023-01-17 | End: 2023-01-17 | Stop reason: HOSPADM

## 2023-01-17 RX ORDER — KETOROLAC TROMETHAMINE 30 MG/ML
15 INJECTION, SOLUTION INTRAMUSCULAR; INTRAVENOUS EVERY 6 HOURS SCHEDULED
Status: DISCONTINUED | OUTPATIENT
Start: 2023-01-17 | End: 2023-01-18 | Stop reason: HOSPADM

## 2023-01-17 RX ORDER — LISINOPRIL 10 MG/1
10 TABLET ORAL DAILY
Status: DISCONTINUED | OUTPATIENT
Start: 2023-01-17 | End: 2023-01-18

## 2023-01-17 RX ORDER — METRONIDAZOLE 500 MG/100ML
500 INJECTION, SOLUTION INTRAVENOUS EVERY 8 HOURS SCHEDULED
Status: COMPLETED | OUTPATIENT
Start: 2023-01-17 | End: 2023-01-18

## 2023-01-17 RX ORDER — GABAPENTIN 300 MG/1
600 CAPSULE ORAL ONCE
Status: DISCONTINUED | OUTPATIENT
Start: 2023-01-17 | End: 2023-01-17 | Stop reason: SDUPTHER

## 2023-01-17 RX ORDER — CELECOXIB 100 MG/1
200 CAPSULE ORAL ONCE
Status: DISCONTINUED | OUTPATIENT
Start: 2023-01-17 | End: 2023-01-17 | Stop reason: SDUPTHER

## 2023-01-17 RX ORDER — FAMOTIDINE 10 MG/ML
20 INJECTION, SOLUTION INTRAVENOUS EVERY 12 HOURS SCHEDULED
Status: DISCONTINUED | OUTPATIENT
Start: 2023-01-17 | End: 2023-01-18 | Stop reason: HOSPADM

## 2023-01-17 RX ORDER — OXYCODONE HCL 5 MG/5 ML
10 SOLUTION, ORAL ORAL EVERY 4 HOURS PRN
Status: DISCONTINUED | OUTPATIENT
Start: 2023-01-17 | End: 2023-01-18 | Stop reason: HOSPADM

## 2023-01-17 RX ORDER — SODIUM CHLORIDE, SODIUM LACTATE, POTASSIUM CHLORIDE, CALCIUM CHLORIDE 600; 310; 30; 20 MG/100ML; MG/100ML; MG/100ML; MG/100ML
100 INJECTION, SOLUTION INTRAVENOUS CONTINUOUS
Status: DISCONTINUED | OUTPATIENT
Start: 2023-01-17 | End: 2023-01-18 | Stop reason: HOSPADM

## 2023-01-17 RX ORDER — ROCURONIUM BROMIDE 10 MG/ML
INJECTION, SOLUTION INTRAVENOUS AS NEEDED
Status: DISCONTINUED | OUTPATIENT
Start: 2023-01-17 | End: 2023-01-17

## 2023-01-17 RX ORDER — DEXAMETHASONE SODIUM PHOSPHATE 4 MG/ML
INJECTION, SOLUTION INTRA-ARTICULAR; INTRALESIONAL; INTRAMUSCULAR; INTRAVENOUS; SOFT TISSUE AS NEEDED
Status: DISCONTINUED | OUTPATIENT
Start: 2023-01-17 | End: 2023-01-17

## 2023-01-17 RX ORDER — SODIUM CHLORIDE, SODIUM LACTATE, POTASSIUM CHLORIDE, CALCIUM CHLORIDE 600; 310; 30; 20 MG/100ML; MG/100ML; MG/100ML; MG/100ML
100 INJECTION, SOLUTION INTRAVENOUS CONTINUOUS
Status: DISCONTINUED | OUTPATIENT
Start: 2023-01-17 | End: 2023-01-18

## 2023-01-17 RX ORDER — SIMETHICONE 80 MG
80 TABLET,CHEWABLE ORAL EVERY 12 HOURS SCHEDULED
Status: DISCONTINUED | OUTPATIENT
Start: 2023-01-17 | End: 2023-01-18 | Stop reason: HOSPADM

## 2023-01-17 RX ORDER — ACETAMINOPHEN 325 MG/1
975 TABLET ORAL ONCE
Status: COMPLETED | OUTPATIENT
Start: 2023-01-17 | End: 2023-01-17

## 2023-01-17 RX ORDER — SCOLOPAMINE TRANSDERMAL SYSTEM 1 MG/1
1 PATCH, EXTENDED RELEASE TRANSDERMAL ONCE
Status: DISCONTINUED | OUTPATIENT
Start: 2023-01-17 | End: 2023-01-17 | Stop reason: SDUPTHER

## 2023-01-17 RX ORDER — METOCLOPRAMIDE HYDROCHLORIDE 5 MG/ML
10 INJECTION INTRAMUSCULAR; INTRAVENOUS EVERY 6 HOURS PRN
Status: DISCONTINUED | OUTPATIENT
Start: 2023-01-17 | End: 2023-01-18 | Stop reason: HOSPADM

## 2023-01-17 RX ORDER — NEOSTIGMINE METHYLSULFATE 1 MG/ML
INJECTION INTRAVENOUS AS NEEDED
Status: DISCONTINUED | OUTPATIENT
Start: 2023-01-17 | End: 2023-01-17

## 2023-01-17 RX ORDER — PROPOFOL 10 MG/ML
INJECTION, EMULSION INTRAVENOUS CONTINUOUS PRN
Status: DISCONTINUED | OUTPATIENT
Start: 2023-01-17 | End: 2023-01-17

## 2023-01-17 RX ORDER — SODIUM CHLORIDE 9 MG/ML
INJECTION, SOLUTION INTRAVENOUS CONTINUOUS PRN
Status: DISCONTINUED | OUTPATIENT
Start: 2023-01-17 | End: 2023-01-17

## 2023-01-17 RX ORDER — PROMETHAZINE HYDROCHLORIDE 25 MG/ML
25 INJECTION, SOLUTION INTRAMUSCULAR; INTRAVENOUS EVERY 6 HOURS PRN
Status: DISCONTINUED | OUTPATIENT
Start: 2023-01-17 | End: 2023-01-18 | Stop reason: HOSPADM

## 2023-01-17 RX ORDER — CEFAZOLIN SODIUM 2 G/50ML
2000 SOLUTION INTRAVENOUS EVERY 8 HOURS
Status: COMPLETED | OUTPATIENT
Start: 2023-01-17 | End: 2023-01-18

## 2023-01-17 RX ORDER — CELECOXIB 100 MG/1
200 CAPSULE ORAL ONCE
Status: COMPLETED | OUTPATIENT
Start: 2023-01-17 | End: 2023-01-17

## 2023-01-17 RX ORDER — ONDANSETRON 2 MG/ML
INJECTION INTRAMUSCULAR; INTRAVENOUS AS NEEDED
Status: DISCONTINUED | OUTPATIENT
Start: 2023-01-17 | End: 2023-01-17

## 2023-01-17 RX ORDER — ACETAMINOPHEN 325 MG/1
975 TABLET ORAL EVERY 6 HOURS SCHEDULED
Status: DISCONTINUED | OUTPATIENT
Start: 2023-01-17 | End: 2023-01-18 | Stop reason: HOSPADM

## 2023-01-17 RX ORDER — GLYCOPYRROLATE 0.2 MG/ML
INJECTION INTRAMUSCULAR; INTRAVENOUS AS NEEDED
Status: DISCONTINUED | OUTPATIENT
Start: 2023-01-17 | End: 2023-01-17

## 2023-01-17 RX ORDER — METRONIDAZOLE 500 MG/100ML
500 INJECTION, SOLUTION INTRAVENOUS ONCE
Status: DISCONTINUED | OUTPATIENT
Start: 2023-01-17 | End: 2023-01-17 | Stop reason: HOSPADM

## 2023-01-17 RX ORDER — DIPHENHYDRAMINE HCL 25 MG
25 TABLET ORAL
Status: DISCONTINUED | OUTPATIENT
Start: 2023-01-17 | End: 2023-01-18 | Stop reason: HOSPADM

## 2023-01-17 RX ORDER — MORPHINE SULFATE 4 MG/ML
4 INJECTION, SOLUTION INTRAMUSCULAR; INTRAVENOUS EVERY 2 HOUR PRN
Status: DISCONTINUED | OUTPATIENT
Start: 2023-01-17 | End: 2023-01-18 | Stop reason: HOSPADM

## 2023-01-17 RX ORDER — LORAZEPAM 2 MG/ML
0.5 INJECTION INTRAMUSCULAR EVERY 6 HOURS PRN
Status: DISCONTINUED | OUTPATIENT
Start: 2023-01-17 | End: 2023-01-18 | Stop reason: HOSPADM

## 2023-01-17 RX ORDER — SODIUM CHLORIDE, SODIUM LACTATE, POTASSIUM CHLORIDE, CALCIUM CHLORIDE 600; 310; 30; 20 MG/100ML; MG/100ML; MG/100ML; MG/100ML
125 INJECTION, SOLUTION INTRAVENOUS CONTINUOUS
Status: DISCONTINUED | OUTPATIENT
Start: 2023-01-17 | End: 2023-01-18

## 2023-01-17 RX ORDER — PANTOPRAZOLE SODIUM 40 MG/10ML
40 INJECTION, POWDER, LYOPHILIZED, FOR SOLUTION INTRAVENOUS ONCE
Status: COMPLETED | OUTPATIENT
Start: 2023-01-17 | End: 2023-01-17

## 2023-01-17 RX ORDER — HEPARIN SODIUM 5000 [USP'U]/ML
5000 INJECTION, SOLUTION INTRAVENOUS; SUBCUTANEOUS
Status: COMPLETED | OUTPATIENT
Start: 2023-01-17 | End: 2023-01-17

## 2023-01-17 RX ORDER — SCOLOPAMINE TRANSDERMAL SYSTEM 1 MG/1
1 PATCH, EXTENDED RELEASE TRANSDERMAL ONCE
Status: DISCONTINUED | OUTPATIENT
Start: 2023-01-17 | End: 2023-01-18 | Stop reason: HOSPADM

## 2023-01-17 RX ORDER — HEPARIN SODIUM 5000 [USP'U]/ML
5000 INJECTION, SOLUTION INTRAVENOUS; SUBCUTANEOUS
Status: DISCONTINUED | OUTPATIENT
Start: 2023-01-17 | End: 2023-01-17 | Stop reason: SDUPTHER

## 2023-01-17 RX ORDER — AMLODIPINE BESYLATE 5 MG/1
5 TABLET ORAL DAILY
Status: DISCONTINUED | OUTPATIENT
Start: 2023-01-17 | End: 2023-01-18 | Stop reason: HOSPADM

## 2023-01-17 RX ORDER — OXYCODONE HCL 5 MG/5 ML
5 SOLUTION, ORAL ORAL EVERY 4 HOURS PRN
Status: DISCONTINUED | OUTPATIENT
Start: 2023-01-17 | End: 2023-01-18 | Stop reason: HOSPADM

## 2023-01-17 RX ORDER — ONDANSETRON 2 MG/ML
4 INJECTION INTRAMUSCULAR; INTRAVENOUS EVERY 6 HOURS PRN
Status: DISCONTINUED | OUTPATIENT
Start: 2023-01-17 | End: 2023-01-18 | Stop reason: HOSPADM

## 2023-01-17 RX ORDER — BUPIVACAINE HYDROCHLORIDE 5 MG/ML
INJECTION, SOLUTION PERINEURAL AS NEEDED
Status: DISCONTINUED | OUTPATIENT
Start: 2023-01-17 | End: 2023-01-17 | Stop reason: HOSPADM

## 2023-01-17 RX ORDER — PROPOFOL 10 MG/ML
INJECTION, EMULSION INTRAVENOUS AS NEEDED
Status: DISCONTINUED | OUTPATIENT
Start: 2023-01-17 | End: 2023-01-17

## 2023-01-17 RX ORDER — HYDROMORPHONE HCL/PF 1 MG/ML
SYRINGE (ML) INJECTION AS NEEDED
Status: DISCONTINUED | OUTPATIENT
Start: 2023-01-17 | End: 2023-01-17

## 2023-01-17 RX ORDER — GABAPENTIN 300 MG/1
300 CAPSULE ORAL ONCE
Status: COMPLETED | OUTPATIENT
Start: 2023-01-17 | End: 2023-01-17

## 2023-01-17 RX ORDER — FENTANYL CITRATE/PF 50 MCG/ML
50 SYRINGE (ML) INJECTION
Status: DISCONTINUED | OUTPATIENT
Start: 2023-01-17 | End: 2023-01-17 | Stop reason: HOSPADM

## 2023-01-17 RX ORDER — ACETAMINOPHEN 325 MG/1
975 TABLET ORAL ONCE
Status: DISCONTINUED | OUTPATIENT
Start: 2023-01-17 | End: 2023-01-17 | Stop reason: SDUPTHER

## 2023-01-17 RX ORDER — MIDAZOLAM HYDROCHLORIDE 2 MG/2ML
INJECTION, SOLUTION INTRAMUSCULAR; INTRAVENOUS AS NEEDED
Status: DISCONTINUED | OUTPATIENT
Start: 2023-01-17 | End: 2023-01-17

## 2023-01-17 RX ORDER — INSULIN LISPRO 100 [IU]/ML
2-12 INJECTION, SOLUTION INTRAVENOUS; SUBCUTANEOUS EVERY 6 HOURS SCHEDULED
Status: DISCONTINUED | OUTPATIENT
Start: 2023-01-17 | End: 2023-01-18

## 2023-01-17 RX ORDER — METRONIDAZOLE 500 MG/100ML
500 INJECTION, SOLUTION INTRAVENOUS ONCE
Status: DISCONTINUED | OUTPATIENT
Start: 2023-01-17 | End: 2023-01-17 | Stop reason: SDUPTHER

## 2023-01-17 RX ORDER — FENTANYL CITRATE 50 UG/ML
INJECTION, SOLUTION INTRAMUSCULAR; INTRAVENOUS AS NEEDED
Status: DISCONTINUED | OUTPATIENT
Start: 2023-01-17 | End: 2023-01-17

## 2023-01-17 RX ORDER — LIDOCAINE HYDROCHLORIDE 20 MG/ML
INJECTION, SOLUTION EPIDURAL; INFILTRATION; INTRACAUDAL; PERINEURAL AS NEEDED
Status: DISCONTINUED | OUTPATIENT
Start: 2023-01-17 | End: 2023-01-17

## 2023-01-17 RX ADMIN — MORPHINE SULFATE 4 MG: 4 INJECTION INTRAVENOUS at 16:20

## 2023-01-17 RX ADMIN — FENTANYL CITRATE 100 MCG: 50 INJECTION, SOLUTION INTRAMUSCULAR; INTRAVENOUS at 10:38

## 2023-01-17 RX ADMIN — INSULIN LISPRO 2 UNITS: 100 INJECTION, SOLUTION INTRAVENOUS; SUBCUTANEOUS at 23:30

## 2023-01-17 RX ADMIN — SODIUM CHLORIDE, SODIUM LACTATE, POTASSIUM CHLORIDE, AND CALCIUM CHLORIDE 100 ML/HR: .6; .31; .03; .02 INJECTION, SOLUTION INTRAVENOUS at 15:55

## 2023-01-17 RX ADMIN — METRONIDAZOLE 500 MG: 500 INJECTION, SOLUTION INTRAVENOUS at 16:20

## 2023-01-17 RX ADMIN — DEXAMETHASONE SODIUM PHOSPHATE 8 MG: 4 INJECTION, SOLUTION INTRA-ARTICULAR; INTRALESIONAL; INTRAMUSCULAR; INTRAVENOUS; SOFT TISSUE at 10:58

## 2023-01-17 RX ADMIN — HYDROMORPHONE HYDROCHLORIDE 0.5 MG: 1 INJECTION, SOLUTION INTRAMUSCULAR; INTRAVENOUS; SUBCUTANEOUS at 10:56

## 2023-01-17 RX ADMIN — FENTANYL CITRATE 50 MCG: 50 INJECTION INTRAMUSCULAR; INTRAVENOUS at 13:42

## 2023-01-17 RX ADMIN — INSULIN LISPRO 2 UNITS: 100 INJECTION, SOLUTION INTRAVENOUS; SUBCUTANEOUS at 17:29

## 2023-01-17 RX ADMIN — SCOPALAMINE 1 PATCH: 1 PATCH, EXTENDED RELEASE TRANSDERMAL at 09:09

## 2023-01-17 RX ADMIN — KETOROLAC TROMETHAMINE 15 MG: 30 INJECTION, SOLUTION INTRAMUSCULAR; INTRAVENOUS at 20:36

## 2023-01-17 RX ADMIN — SODIUM CHLORIDE, SODIUM LACTATE, POTASSIUM CHLORIDE, AND CALCIUM CHLORIDE 100 ML/HR: .6; .31; .03; .02 INJECTION, SOLUTION INTRAVENOUS at 16:16

## 2023-01-17 RX ADMIN — PROPOFOL 100 MCG/KG/MIN: 10 INJECTION, EMULSION INTRAVENOUS at 10:43

## 2023-01-17 RX ADMIN — SUGAMMADEX 200 MG: 100 INJECTION, SOLUTION INTRAVENOUS at 12:52

## 2023-01-17 RX ADMIN — SODIUM CHLORIDE, SODIUM LACTATE, POTASSIUM CHLORIDE, AND CALCIUM CHLORIDE 125 ML/HR: .6; .31; .03; .02 INJECTION, SOLUTION INTRAVENOUS at 09:09

## 2023-01-17 RX ADMIN — FENTANYL CITRATE 50 MCG: 50 INJECTION INTRAMUSCULAR; INTRAVENOUS at 14:52

## 2023-01-17 RX ADMIN — ROCURONIUM BROMIDE 50 MG: 10 INJECTION, SOLUTION INTRAVENOUS at 10:39

## 2023-01-17 RX ADMIN — CELECOXIB 200 MG: 100 CAPSULE ORAL at 09:08

## 2023-01-17 RX ADMIN — MIDAZOLAM 2 MG: 1 INJECTION INTRAMUSCULAR; INTRAVENOUS at 10:31

## 2023-01-17 RX ADMIN — NEOSTIGMINE METHYLSULFATE 5 MG: 1 INJECTION INTRAVENOUS at 12:45

## 2023-01-17 RX ADMIN — ACETAMINOPHEN 975 MG: 325 TABLET, FILM COATED ORAL at 09:08

## 2023-01-17 RX ADMIN — FAMOTIDINE 20 MG: 10 INJECTION, SOLUTION INTRAVENOUS at 18:07

## 2023-01-17 RX ADMIN — CEFAZOLIN SODIUM 2000 MG: 2 SOLUTION INTRAVENOUS at 18:07

## 2023-01-17 RX ADMIN — SODIUM CHLORIDE: 0.9 INJECTION, SOLUTION INTRAVENOUS at 10:47

## 2023-01-17 RX ADMIN — HYDROMORPHONE HYDROCHLORIDE 0.5 MG: 1 INJECTION, SOLUTION INTRAMUSCULAR; INTRAVENOUS; SUBCUTANEOUS at 11:08

## 2023-01-17 RX ADMIN — GLYCOPYRROLATE 8 MG: 0.2 INJECTION INTRAMUSCULAR; INTRAVENOUS at 12:45

## 2023-01-17 RX ADMIN — GABAPENTIN 300 MG: 300 CAPSULE ORAL at 09:09

## 2023-01-17 RX ADMIN — SIMETHICONE 80 MG: 80 TABLET, CHEWABLE ORAL at 20:36

## 2023-01-17 RX ADMIN — PANTOPRAZOLE SODIUM 40 MG: 40 INJECTION, POWDER, FOR SOLUTION INTRAVENOUS at 13:36

## 2023-01-17 RX ADMIN — LIDOCAINE HYDROCHLORIDE 100 MG: 20 INJECTION, SOLUTION EPIDURAL; INFILTRATION; INTRACAUDAL; PERINEURAL at 10:38

## 2023-01-17 RX ADMIN — HEPARIN SODIUM 5000 UNITS: 5000 INJECTION INTRAVENOUS; SUBCUTANEOUS at 09:09

## 2023-01-17 RX ADMIN — ONDANSETRON 4 MG: 2 INJECTION INTRAMUSCULAR; INTRAVENOUS at 10:56

## 2023-01-17 RX ADMIN — DEXMEDETOMIDINE 0.1 MCG/KG/HR: 100 INJECTION, SOLUTION, CONCENTRATE INTRAVENOUS at 10:43

## 2023-01-17 RX ADMIN — PROPOFOL 200 MG: 10 INJECTION, EMULSION INTRAVENOUS at 10:38

## 2023-01-17 RX ADMIN — ACETAMINOPHEN 975 MG: 325 TABLET, FILM COATED ORAL at 17:32

## 2023-01-17 RX ADMIN — METRONIDAZOLE 500 MG: 500 INJECTION, SOLUTION INTRAVENOUS at 23:28

## 2023-01-17 RX ADMIN — ACETAMINOPHEN 975 MG: 325 TABLET, FILM COATED ORAL at 23:29

## 2023-01-17 RX ADMIN — CEFAZOLIN 3000 MG: 1 INJECTION, POWDER, FOR SOLUTION INTRAVENOUS at 10:39

## 2023-01-17 NOTE — ANESTHESIA PREPROCEDURE EVALUATION
Procedure:  LAPAROSCOPIC ALEAH-EN-Y GASTRIC BYPASS AND INTRAOPERATIVE EGD (Abdomen)    Relevant Problems   ANESTHESIA (within normal limits)      CARDIO   (+) Essential hypertension   (+) Mixed hyperlipidemia      ENDO   (+) New onset type 2 diabetes mellitus (HCC)      PULMONARY   (+) BRAEDEN (obstructive sleep apnea)        Physical Exam    Airway    Mallampati score: III  TM Distance: >3 FB  Neck ROM: full     Dental   No notable dental hx     Cardiovascular  Rhythm: regular, Rate: normal,     Pulmonary  Breath sounds clear to auscultation,     Other Findings        Anesthesia Plan  ASA Score- 3     Anesthesia Type- general with ASA Monitors  Additional Monitors:   Airway Plan: ETT  Plan Factors-Exercise tolerance (METS): >4 METS  Chart reviewed  Existing labs reviewed  Patient summary reviewed  Patient is not a current smoker  Induction- intravenous  Postoperative Plan- Plan for postoperative opioid use  Planned trial extubation    Informed Consent- Anesthetic plan and risks discussed with patient  I personally reviewed this patient with the CRNA  Discussed and agreed on the Anesthesia Plan with the CRNA  Diane Christine

## 2023-01-17 NOTE — ANESTHESIA POSTPROCEDURE EVALUATION
Post-Op Assessment Note    CV Status:  Stable  Pain Score: 0    Pain management: adequate     Mental Status:  Sleepy and arousable   Hydration Status:  Euvolemic and stable   PONV Controlled:  Controlled   Airway Patency:  Patent and adequate      Post Op Vitals Reviewed: Yes      Staff: CRNA         No notable events documented      /91 (01/17/23 1315)    Temp 97 5 °F (36 4 °C) (01/17/23 1315)    Pulse 75 (01/17/23 1315)   Resp 16 (01/17/23 1315)    SpO2 98 % (01/17/23 1315)

## 2023-01-17 NOTE — OP NOTE
OPERATIVE REPORT  PATIENT NAME: Мария Camarillo    :  1970  MRN: [id number]  Pt Location: [place] OR ROOM 02    SURGERY DATE: 2023    Surgeon(s) and Role:     * Howard Stout MD - Primary     * Jorge Horne PA-C - Assisting    Preop Diagnosis:  Morbid obesity ([facility] [address] ) [E66 01]  Diabetes mellitus ([facility] [address] ) [E11 9]  Essential hypertension, malignant [I10]    Post-Op Diagnosis Codes:     * Morbid obesity ([address] ) [E66 01]     * Diabetes mellitus ([facility] [address] ) [E11 9]     * Essential hypertension, malignant [I10]    Procedure(s) (LRB):  LAPAROSCOPIC ALEAH-EN-Y GASTRIC BYPASS AND INTRAOPERATIVE EGD (N/A)  LYSIS ADHESIONS (N/A)    Specimen(s):  * No specimens in log *    Estimated Blood Loss:   20cc    Drains:  * No LDAs found *    Anesthesia Type:   General    Operative Indications: Morbid obesity ([facility] [address] ) [E66 01]  Diabetes mellitus ([facility] [address] ) [E11 9]  Essential hypertension, malignant [I10]      Operative Findings:  Pelvic omental adhesions     Complications:   None    Procedure and Technique:  INDICATION:    Мария Camarillo is a 47 y o  female with a Body mass index is 45 2 kg/m²  and a long standing history of morbid obesity and inability to lose a significant amount of weight on its own  This patient was found to be a good candidate to undergo a bariatric procedure upon being enrolled here at the [facility] in [place]  OPERATIVE TECHNIQUE    The patient was taken to the operating room and placed in a supine position  A dose of IV antibiotic prophylaxis that consisted of Ancef 3g and Metronidazole 500mg was given  Also, 5000 units of subcutaneous unfractionated heparin to prevent DVT were administered  Sequential compression devices were placed on both lower extremities  After satisfactory general anesthesia induction and endotracheal intubation was achieved, the extremities were secured to prevent neurovascular and musculoskeletal injuries as best as possible   Subsequently, the abdominal wall was prepped and draped in the standard sterile fashion  After a timeout was done and the patient was properly identified and the type of procedure was confirmed a LUQ transverse skin incision was made, and the subcutaneous tissues dissected  A veress needle technique was used for access to the peritoneal cavity and pneumoperitoneum was created to 15mmHg  Access to the peritoneal cavity was gained with an 12mm optiview trocar  With this device, we were able to visualize the layers of the abdominal wall, and enter the peritoneal cavity under direct visualization  A 12-mm port was placed in the suprumbilical position to the patient's left of the midline for the camera  There were pelvic omental adhesions that required lysis  This was done with harmonic scalpel with good hemostasis  A four quadrant transversus abdominis plane block was performed under direct laparoscopic vision  Under direct laparoscopic visualization, three additional trocars were placed: a 12 mm in the right upper quadrant subcostal position in the anterior axillary line, a 12-mm port was placed in the right flank midclavicular line, a 12-mm port was placed in the left flank position in the midclavicular line   The omentum of the transverse colon was identified and elevated, this allowed for the ligament of Treitz to be visualized  The small bowel was run about 60 cm to a point distal from the ligament of Treitz and was divided with a stapler and a 60 mm cartridge  The Shaila limb was then measured at 160 cm, and the 150 cm sheba was brought in side-to-side opposition to the biliopancreatic limb  A side-to-side jejunojejunostomy was then created  This was accomplished by first making an antimesenteric enterotomy with cautery energy device  We then positioned the laparoscopic stapler with a 60-mm cartridge within the lumen of the bowel to create a stapled side-to-side anastomosis   The enterotomy was then approximated with a 2-0 surgidac suture, subsequently elevated and closed transversely utilizing an additional 60-mm cartridge  The resulting mesenteric defect was then closed with a running nonabsorbable suture  A Brolin stitch was placed to prevent kinking  We proceeded to divide the omentum all the way to the transverse colon  At this point we repositioned the patient into a reverse Trendelenburg and the T-Melo liver retractor was placed in the subxiphoid position through the use of a 5-mm trocar incision  We then turned our attention to the gastroesophageal junction  The left alyssa was skeletonized dissecting at the angle of His  The lesser sac was entered in the perigastric fashion at the level just inferior to the take off of the left gastric artery  The left gastric artery and hepatic vagal branches were preserved  We then created a 30 cc gastric pouch  To accomplish this, serial firings of a laparoscopic stapler 60-mm cartridge were utilized  This was accomplished by a  transverse firing of the stapler along the inferior edge of the pouch and then vertical serial firings of the stapler to the angle of His  This completely  the pouch from the gastric remnant  The Shaila limb was then passed in a antecolic and antegastric position to the pouch  This was accomplished without tension and without twist     A 2-0 absorbable suture was used to create the posterior strength layer of the GJ from the shaila limb and posterior gastric pouch in a running fashion  Adjacent gastrotomy and enterotomy were then created using harmonic scalpel  A 45mm linear purple staple load was then introduced into the gastrotomy and enterotomy up to about the 2-2 5cm sheba  The stapler was fired to make the posterior layer  The anterior layer was then closed in the running fashion using a 2-0 absorbable suture beginning at the medial and lateral corners and meeting in the middle     The EGD scope was passed across the anastomosis for the final suture placements and tied  The second layer anteriorly was then created running from the lateral corner to the medial corner  The distal Shaila limb was occluded and an EGD as well as an air insufflation test were performed  No intraoperative bleeding nor leaks were detected  The Nolen's defect between the Shaila limb mesentery and the mesocolon and colon was closed in a running fashion with a 2-0 surgidac suture  The sponge, needle and instrument count was reported complete  The periumbilical 50-SI camera trocar site was then closed with the use of a suture closure device and a 0 absorbable suture  The liver retractor was removed under direct laparoscopic visualization, and no bleeding was noted  The remaining ports were then also removed under laparoscopic visualization  The skin incisions were all closed with 4-0 absorbable subcuticular suture  The patient tolerated the procedure well, was extubated uneventfully and was transferred to the recovery room in stable condition  I was present for the entire length of the procedure as the attending of record  No qualified resident was available to assist   The presence of an assistant was necessary for camera holding, traction and counter traction and for help with suturing and stapling in addition to performing the intraop-EGD     I was present for the entire procedure and I was present for all critical portions of the procedure    Patient Disposition:  PACU  and extubated and stable    This procedure was not performed to treat colon cancer through resection      SIGNATURE: Tianna Wagoner MD  DATE: January 17, 2023  TIME: 12:48 PM

## 2023-01-17 NOTE — PLAN OF CARE
Problem: PAIN - ADULT  Goal: Verbalizes/displays adequate comfort level or baseline comfort level  Description: Interventions:  - Encourage patient to monitor pain and request assistance  - Assess pain using appropriate pain scale  - Administer analgesics based on type and severity of pain and evaluate response  - Implement non-pharmacological measures as appropriate and evaluate response  - Consider cultural and social influences on pain and pain management  - Notify physician/advanced practitioner if interventions unsuccessful or patient reports new pain  Outcome: Progressing     Problem: INFECTION - ADULT  Goal: Absence or prevention of progression during hospitalization  Description: INTERVENTIONS:  - Assess and monitor for signs and symptoms of infection  - Monitor lab/diagnostic results  - Monitor all insertion sites, i e  indwelling lines, tubes, and drains  - Monitor endotracheal if appropriate and nasal secretions for changes in amount and color  - [place] appropriate cooling/warming therapies per order  - Administer medications as ordered  - Instruct and encourage patient and family to use good hand hygiene technique  - Identify and instruct in appropriate isolation precautions for identified infection/condition  Outcome: Progressing  Goal: Absence of fever/infection during neutropenic period  Description: INTERVENTIONS:  - Monitor WBC    Outcome: Progressing     Problem: SAFETY ADULT  Goal: Patient will remain free of falls  Description: INTERVENTIONS:  - Educate patient/family on patient safety including physical limitations  - Instruct patient to call for assistance with activity   - Consult OT/PT to assist with strengthening/mobility   - Keep Call bell within reach  - Keep bed low and locked with side rails adjusted as appropriate  - Keep care items and personal belongings within reach  - Initiate and maintain comfort rounds  - Make Fall Risk Sign visible to staff  - Offer Toileting every  Hours, in advance of need  - Initiate/Maintain alarm  - Obtain necessary fall risk management equipment:   - Apply yellow socks and bracelet for high fall risk patients  - Consider moving patient to room near nurses station  Outcome: Progressing  Goal: Maintain or return to baseline ADL function  Description: INTERVENTIONS:  -  Assess patient's ability to carry out ADLs; assess patient's baseline for ADL function and identify physical deficits which impact ability to perform ADLs (bathing, care of mouth/teeth, toileting, grooming, dressing, etc )  - Assess/evaluate cause of self-care deficits   - Assess range of motion  - Assess patient's mobility; develop plan if impaired  - Assess patient's need for assistive devices and provide as appropriate  - Encourage maximum independence but intervene and supervise when necessary  - Involve family in performance of ADLs  - Assess for home care needs following discharge   - Consider OT consult to assist with ADL evaluation and planning for discharge  - Provide patient education as appropriate  Outcome: Progressing  Goal: Maintains/Returns to pre admission functional level  Description: INTERVENTIONS:  - Perform BMAT or MOVE assessment daily    - Set and communicate daily mobility goal to care team and patient/family/caregiver  - Collaborate with rehabilitation services on mobility goals if consulted  - Perform Range of Motion  times a day  - Reposition patient every  hours    - Dangle patient  times a day  - Stand patient  times a day  - Ambulate patient  times a day  - Out of bed to chair times a day   - Out of bed for meals  times a day  - Out of bed for toileting  - Record patient progress and toleration of activity level   Outcome: Progressing     Problem: DISCHARGE PLANNING  Goal: Discharge to home or other facility with appropriate resources  Description: INTERVENTIONS:  - Identify barriers to discharge w/patient and caregiver  - Arrange for needed discharge resources and transportation as appropriate  - Identify discharge learning needs (meds, wound care, etc )  - Arrange for interpretive services to assist at discharge as needed  - Refer to Case Management Department for coordinating discharge planning if the patient needs post-hospital services based on physician/advanced practitioner order or complex needs related to functional status, cognitive ability, or social support system  Outcome: Progressing     Problem: Knowledge Deficit  Goal: Patient/family/caregiver demonstrates understanding of disease process, treatment plan, medications, and discharge instructions  Description: Complete learning assessment and assess knowledge base    Interventions:  - Provide teaching at level of understanding  - Provide teaching via preferred learning methods  Outcome: Progressing

## 2023-01-18 ENCOUNTER — RA CDI HCC (OUTPATIENT)
Dept: OTHER | Facility: HOSPITAL | Age: 53
End: 2023-01-18

## 2023-01-18 ENCOUNTER — TRANSITIONAL CARE MANAGEMENT (OUTPATIENT)
Dept: FAMILY MEDICINE CLINIC | Facility: CLINIC | Age: 53
End: 2023-01-18

## 2023-01-18 VITALS
TEMPERATURE: 98.1 F | SYSTOLIC BLOOD PRESSURE: 111 MMHG | RESPIRATION RATE: 16 BRPM | WEIGHT: 288.6 LBS | OXYGEN SATURATION: 95 % | HEIGHT: 67 IN | DIASTOLIC BLOOD PRESSURE: 63 MMHG | BODY MASS INDEX: 45.3 KG/M2 | HEART RATE: 66 BPM

## 2023-01-18 LAB
ANION GAP SERPL CALCULATED.3IONS-SCNC: 9 MMOL/L (ref 4–13)
BUN SERPL-MCNC: 9 MG/DL (ref 5–25)
CALCIUM SERPL-MCNC: 8.6 MG/DL (ref 8.3–10.1)
CHLORIDE SERPL-SCNC: 102 MMOL/L (ref 96–108)
CO2 SERPL-SCNC: 25 MMOL/L (ref 21–32)
CREAT SERPL-MCNC: 0.8 MG/DL (ref 0.6–1.3)
ERYTHROCYTE [DISTWIDTH] IN BLOOD BY AUTOMATED COUNT: 13.4 % (ref 11.6–15.1)
EST. AVERAGE GLUCOSE BLD GHB EST-MCNC: 166 MG/DL
GFR SERPL CREATININE-BSD FRML MDRD: 84 ML/MIN/1.73SQ M
GLUCOSE SERPL-MCNC: 130 MG/DL (ref 65–140)
GLUCOSE SERPL-MCNC: 146 MG/DL (ref 65–140)
HBA1C MFR BLD: 7.4 %
HCT VFR BLD AUTO: 40 % (ref 34.8–46.1)
HGB BLD-MCNC: 13.1 G/DL (ref 11.5–15.4)
MCH RBC QN AUTO: 28.5 PG (ref 26.8–34.3)
MCHC RBC AUTO-ENTMCNC: 32.8 G/DL (ref 31.4–37.4)
MCV RBC AUTO: 87 FL (ref 82–98)
PLATELET # BLD AUTO: 194 THOUSANDS/UL (ref 149–390)
PMV BLD AUTO: 11.9 FL (ref 8.9–12.7)
POTASSIUM SERPL-SCNC: 3.9 MMOL/L (ref 3.5–5.3)
RBC # BLD AUTO: 4.6 MILLION/UL (ref 3.81–5.12)
SODIUM SERPL-SCNC: 136 MMOL/L (ref 135–147)
WBC # BLD AUTO: 11.96 THOUSAND/UL (ref 4.31–10.16)

## 2023-01-18 RX ORDER — LISINOPRIL 10 MG/1
10 TABLET ORAL DAILY
Status: DISCONTINUED | OUTPATIENT
Start: 2023-01-18 | End: 2023-01-18 | Stop reason: HOSPADM

## 2023-01-18 RX ORDER — ONDANSETRON 4 MG/1
4 TABLET, FILM COATED ORAL EVERY 8 HOURS PRN
Qty: 20 TABLET | Refills: 0 | Status: SHIPPED | OUTPATIENT
Start: 2023-01-18

## 2023-01-18 RX ORDER — LISINOPRIL 10 MG/1
5 TABLET ORAL DAILY
Qty: 90 TABLET | Refills: 0
Start: 2023-01-18 | End: 2023-01-25

## 2023-01-18 RX ORDER — INSULIN LISPRO 100 [IU]/ML
1-5 INJECTION, SOLUTION INTRAVENOUS; SUBCUTANEOUS
Status: DISCONTINUED | OUTPATIENT
Start: 2023-01-18 | End: 2023-01-18 | Stop reason: HOSPADM

## 2023-01-18 RX ORDER — ACETAMINOPHEN 325 MG/1
975 TABLET ORAL EVERY 8 HOURS SCHEDULED
Qty: 63 TABLET | Refills: 0
Start: 2023-01-18 | End: 2023-01-25

## 2023-01-18 RX ORDER — ASPIRIN 325 MG
325 TABLET, DELAYED RELEASE (ENTERIC COATED) ORAL DAILY
Qty: 21 TABLET | Refills: 0
Start: 2023-01-18 | End: 2023-01-25

## 2023-01-18 RX ADMIN — AMLODIPINE BESYLATE 5 MG: 5 TABLET ORAL at 09:32

## 2023-01-18 RX ADMIN — FAMOTIDINE 20 MG: 10 INJECTION, SOLUTION INTRAVENOUS at 09:34

## 2023-01-18 RX ADMIN — SODIUM CHLORIDE, SODIUM LACTATE, POTASSIUM CHLORIDE, AND CALCIUM CHLORIDE 100 ML/HR: .6; .31; .03; .02 INJECTION, SOLUTION INTRAVENOUS at 03:10

## 2023-01-18 RX ADMIN — KETOROLAC TROMETHAMINE 15 MG: 30 INJECTION, SOLUTION INTRAMUSCULAR; INTRAVENOUS at 05:20

## 2023-01-18 RX ADMIN — CEFAZOLIN SODIUM 2000 MG: 2 SOLUTION INTRAVENOUS at 03:08

## 2023-01-18 RX ADMIN — METOPROLOL TARTRATE 25 MG: 25 TABLET, FILM COATED ORAL at 09:32

## 2023-01-18 RX ADMIN — OXYCODONE HYDROCHLORIDE 10 MG: 5 SOLUTION ORAL at 03:10

## 2023-01-18 RX ADMIN — LISINOPRIL 10 MG: 10 TABLET ORAL at 09:32

## 2023-01-18 RX ADMIN — ACETAMINOPHEN 975 MG: 325 TABLET, FILM COATED ORAL at 05:21

## 2023-01-18 NOTE — UTILIZATION REVIEW
Initial Clinical Review    Elective inpatient surgical procedure  Age/Sex: 47 y o  female  Surgery Date: 1/17/23  Procedure:   LAPAROSCOPIC ALEAH-EN-Y GASTRIC BYPASS AND INTRAOPERATIVE EGD   LYSIS ADHESIONS  Anesthesia: general  Operative Findings:   Pelvic omental adhesions     POD#1 Progress Note:   D/c home today, 1/18/23    Admission Orders: Date/Time/Statement:   Admission Orders (From admission, onward)     Ordered        01/17/23 1305  Inpatient Admission  Once                      Orders Placed This Encounter   Procedures   • Inpatient Admission     Standing Status:   Standing     Number of Occurrences:   1     Order Specific Question:   Level of Care     Answer:   Med Surg [16]     Order Specific Question:   Bed Type     Answer:   Bariatric [1]     Order Specific Question:   Estimated length of stay     Answer:   Inpatient Only Surgery     Vital Signs: /63   Pulse 66   Temp 98 1 °F (36 7 °C)   Resp 16   Ht 5' 7" (1 702 m)   Wt 131 kg (288 lb 9 6 oz)   SpO2 95%   BMI 45 20 kg/m²     Pertinent Labs/Diagnostic Test Results:     Results from last 7 days   Lab Units 01/18/23  0639   WBC Thousand/uL 11 96*   HEMOGLOBIN g/dL 13 1   HEMATOCRIT % 40 0   PLATELETS Thousands/uL 194       Results from last 7 days   Lab Units 01/18/23  0639   SODIUM mmol/L 136   POTASSIUM mmol/L 3 9   CHLORIDE mmol/L 102   CO2 mmol/L 25   ANION GAP mmol/L 9   BUN mg/dL 9   CREATININE mg/dL 0 80   EGFR ml/min/1 73sq m 84   CALCIUM mg/dL 8 6       Results from last 7 days   Lab Units 01/18/23  0711 01/17/23  2315 01/17/23  1727   POC GLUCOSE mg/dl 130 188* 156*     Results from last 7 days   Lab Units 01/18/23  0639   GLUCOSE RANDOM mg/dL 146*       Diet: bariatric clear liquids  Mobility: ambulate  DVT Prophylaxis: scd    Medications/Pain Control:   Scheduled Medications:  acetaminophen, 975 mg, Oral, Q6H GERRY  amLODIPine, 5 mg, Oral, Daily  famotidine, 20 mg, Intravenous, Q12H GERRY  insulin lispro, 1-5 Units, Subcutaneous, TID AC  insulin lispro, 1-5 Units, Subcutaneous, HS  ketorolac, 15 mg, Intravenous, Q6H GERRY  lisinopril, 10 mg, Oral, Daily  metoprolol tartrate, 25 mg, Oral, Q12H [facility]  scopolamine, 1 patch, Transdermal, Once  simethicone, 80 mg, Oral, Q12H [facility]    Continuous IV Infusions:  lactated ringers, 100 mL/hr, Intravenous, Continuous    PRN Meds:  diphenhydrAMINE, 25 mg, Oral, HS PRN  lactated ringers, 1,000 mL, Intravenous, Once PRN   And  lactated ringers, 1,000 mL, Intravenous, Once PRN  LORazepam, 0 5 mg, Intravenous, Q6H PRN  metoclopramide, 10 mg, Intravenous, Q6H PRN  morphine injection, 2 mg, Intravenous, Q2H PRN  morphine injection, 4 mg, Intravenous, Q2H PRN  ondansetron, 4 mg, Intravenous, Q6H PRN  oxyCODONE, 10 mg, Oral, Q4H PRN  oxyCODONE, 5 mg, Oral, Q4H PRN  phenol, 1 spray, Mouth/Throat, Q2H PRN  promethazine, 25 mg, Intramuscular, Q6H PRN  sodium chloride, 1,000 mL, Intravenous, Once PRN   And  sodium chloride, 1,000 mL, Intravenous, Once PRN    Network Utilization Review Department  ATTENTION: Please call with any questions or concerns to [phone] and carefully listen to the prompts so that you are directed to the right person  All voicemails are confidential   Tish Jones all requests for admission clinical reviews, approved or denied determinations and any other requests to dedicated fax number below belonging to the campus where the patient is receiving treatment   List of dedicated fax numbers for the Facilities:  [address] [facility] [address] DENIALS (Administrative/Medical Necessity) [phone]   [address] [place] [address] (Maternity/NICU/Pediatrics) [phone]   [address]  [phone]   [address] [facility] [phone]

## 2023-01-18 NOTE — ASSESSMENT & PLAN NOTE
· Status post elective laparoscopic Shaila-EN-Y gastric bypass, POD 1  · Patient is tolerating bariatric clear liquid diet

## 2023-01-18 NOTE — PROGRESS NOTES
Progress Note - Bariatric Surgery   Faiza Jones 47 y o  female MRN: [id number]  Unit/Bed#: 2 [place] [address] Encounter: [id number]      Subjective/Objective     Subjective:   Tylenol pieces causing nausea  Tolerating liquid diet without nausea or vomiting, pain adequately controlled on oral pain medication, ambulating without assistance, voiding but feels it is slow, using incentive spirometer  Denies fevers, chills, sweats, SOB, CP, calf pain  Objective:    /63   Pulse 66   Temp 98 1 °F (36 7 °C)   Resp 16   Ht 5' 7" (1 702 m)   Wt 131 kg (288 lb 9 6 oz)   SpO2 95%   BMI 45 20 kg/m²       Intake/Output Summary (Last 24 hours) at 1/18/2023 0809  Last data filed at 1/18/2023 0501  Gross per 24 hour   Intake 910 ml   Output 650 ml   Net 260 ml       Invasive Devices     Peripheral Intravenous Line  Duration           Peripheral IV 01/17/23 Dorsal (posterior); Left Hand <1 day    Peripheral IV 01/17/23 Dorsal (posterior); Right Hand <1 day                ROS: 10-point system completed  All negative except see HPI  Physical Exam    General Appearance:    Alert, cooperative, no distress, appears stated age   Head:    Normocephalic, without obvious abnormality, atraumatic   Lungs:     respirations unlabored   Heart:    Regular rate and rhythm   Abdomen:     Soft, appropriate tenderness, non distended, incisions clean, dry, and intact, small ecchymotic area around camera trocar site   Extremities:   Extremities normal, atraumatic, no cyanosis or edema                   Lab, Imaging and other studies:  I have personally reviewed pertinent lab results    , CBC:   Lab Results   Component Value Date    WBC 11 96 (H) 01/18/2023    HGB 13 1 01/18/2023    HCT 40 0 01/18/2023    MCV 87 01/18/2023     01/18/2023    MCH 28 5 01/18/2023    MCHC 32 8 01/18/2023    RDW 13 4 01/18/2023    MPV 11 9 01/18/2023   , CMP:   Lab Results   Component Value Date    SODIUM 136 01/18/2023    K 3 9 01/18/2023     01/18/2023    CO2 25 01/18/2023    BUN 9 01/18/2023    CREATININE 0 80 01/18/2023    CALCIUM 8 6 01/18/2023    EGFR 84 01/18/2023        VTE Mechanical Prophylaxis: sequential compression device    Assessment/Plan  46 yo F s/p lap RYGB POD1 with stable post op course  Encourage PO fluids, ambulation, and incentive spirometry  Will plan for D/C home today    DM - blood sugars well controlled; will hold all DM meds, test blood sugars at home and f/u with PCP in 1 week    HTN - BP has been soft this morning, will continue metoprolol, D/C norvasc, cut amlodipine in half per Dr Unice Merlin and carefully monitor at home and if feeling dizzy/lightheaded hold meds and f/u with PCP in 1 week  Appreciate SLIM recommendations    Plan of care was discussed with patient and patient's nurse  Care plan discussed with Dr Unice Merlin  Dispo: Continue bariatric clear liquid diet, ambulation, incentive spirometry         Anila Bernstein PA-C  1/18/2023  8:10 AM

## 2023-01-18 NOTE — PLAN OF CARE
Problem: PAIN - ADULT  Goal: Verbalizes/displays adequate comfort level or baseline comfort level  Description: Interventions:  - Encourage patient to monitor pain and request assistance  - Assess pain using appropriate pain scale  - Administer analgesics based on type and severity of pain and evaluate response  - Implement non-pharmacological measures as appropriate and evaluate response  - Consider cultural and social influences on pain and pain management  - Notify physician/advanced practitioner if interventions unsuccessful or patient reports new pain  Outcome: Progressing     Problem: INFECTION - ADULT  Goal: Absence or prevention of progression during hospitalization  Description: INTERVENTIONS:  - Assess and monitor for signs and symptoms of infection  - Monitor lab/diagnostic results  - Monitor all insertion sites, i e  indwelling lines, tubes, and drains  - Monitor endotracheal if appropriate and nasal secretions for changes in amount and color  - [place] appropriate cooling/warming therapies per order  - Administer medications as ordered  - Instruct and encourage patient and family to use good hand hygiene technique  - Identify and instruct in appropriate isolation precautions for identified infection/condition  Outcome: Progressing  Goal: Absence of fever/infection during neutropenic period  Description: INTERVENTIONS:  - Monitor WBC    Outcome: Progressing     Problem: SAFETY ADULT  Goal: Patient will remain free of falls  Description: INTERVENTIONS:  - Educate patient/family on patient safety including physical limitations  - Instruct patient to call for assistance with activity   - Consult OT/PT to assist with strengthening/mobility   - Keep Call bell within reach  - Keep bed low and locked with side rails adjusted as appropriate  - Keep care items and personal belongings within reach  - Initiate and maintain comfort rounds  - Make Fall Risk Sign visible to staff  - Apply yellow socks and bracelet for high fall risk patients  - Consider moving patient to room near nurses station  Outcome: Progressing  Goal: Maintain or return to baseline ADL function  Description: INTERVENTIONS:  -  Assess patient's ability to carry out ADLs; assess patient's baseline for ADL function and identify physical deficits which impact ability to perform ADLs (bathing, care of mouth/teeth, toileting, grooming, dressing, etc )  - Assess/evaluate cause of self-care deficits   - Assess range of motion  - Assess patient's mobility; develop plan if impaired  - Assess patient's need for assistive devices and provide as appropriate  - Encourage maximum independence but intervene and supervise when necessary  - Involve family in performance of ADLs  - Assess for home care needs following discharge   - Consider OT consult to assist with ADL evaluation and planning for discharge  - Provide patient education as appropriate  Outcome: Progressing  Goal: Maintains/Returns to pre admission functional level  Description: INTERVENTIONS:  - Perform BMAT or MOVE assessment daily    - Set and communicate daily mobility goal to care team and patient/family/caregiver     - Collaborate with rehabilitation services on mobility goals if consulted  - Out of bed for toileting  - Record patient progress and toleration of activity level   Outcome: Progressing     Problem: DISCHARGE PLANNING  Goal: Discharge to home or other facility with appropriate resources  Description: INTERVENTIONS:  - Identify barriers to discharge w/patient and caregiver  - Arrange for needed discharge resources and transportation as appropriate  - Identify discharge learning needs (meds, wound care, etc )  - Arrange for interpretive services to assist at discharge as needed  - Refer to Case Management Department for coordinating discharge planning if the patient needs post-hospital services based on physician/advanced practitioner order or complex needs related to functional status, cognitive ability, or social support system  Outcome: Progressing     Problem: Knowledge Deficit  Goal: Patient/family/caregiver demonstrates understanding of disease process, treatment plan, medications, and discharge instructions  Description: Complete learning assessment and assess knowledge base    Interventions:  - Provide teaching at level of understanding  - Provide teaching via preferred learning methods  Outcome: Progressing

## 2023-01-18 NOTE — ASSESSMENT & PLAN NOTE
Lab Results   Component Value Date    HGBA1C 7 4 (H) 01/18/2023       Recent Labs     01/17/23  1727 01/17/23  2315 01/18/23  0711   POCGLU 156* 188* 130       Blood Sugar Average: Last 72 hrs:  (P) 158   · Recently started on metformin  · Discussed with patient to resume metformin once she advances to solid diabetic diet    · Advised to monitor blood sugars before each meal and at bedtime

## 2023-01-18 NOTE — ASSESSMENT & PLAN NOTE
· On Norvasc, metoprolol, lisinopril at home  · Continue home medications with holding parameter    · Blood pressure has been on the soft side  · Cussed with patient to monitor blood pressure and restart metoprolol, lisinopril and Norvasc and the daughter

## 2023-01-18 NOTE — DISCHARGE INSTR - AVS FIRST PAGE
Bariatric/Weight Loss Surgery  Hospital Discharge Instructions  ACTIVITY:  Progress as feels comfortable - a good rule is:  if you are doing something and it begins to hurt, stop doing the activity  Walk every hour while at home  You may walk stairs if you do so slowly  You may shower 48 hours after surgery  Use your incentive spirometer 10 times per hour while awake for 1 week  Do NOT drive for 48 hours after surgery  No driving 24 hours after taking certain prescription pain medications   Examples of such medication are Percocet, Darvocet, Oxycodone, Tylenol #3, and Tylenol with Codeine  Follow your pharmacist’s orders  DIET  Stay on a liquid diet for 7 days after your surgery date, sipping slowly  Refer to your manual for examples of choices  Remember to keep your liquids sugar free or low calorie  You may have protein drinks  Make sure to drink 48 to 64 ounces per day of fluids  You may advance to a pureed diet one week after surgery as instructed by your diet progression pamphlet  Once you get approval from your surgeon at your first post operative visit you may advance to the soft diet  MEDICATIONS:  The abdominal nerve block will wear off during the first 1-2 days that you are home, and you may become sore  Continue to take your Tylenol and your pain medication as instructed  Start vitamins and minerals per Inge's instructions   Anti-acid Medication as per prescription  Other medications as indicated on the Physician Patient Discharge Instructions form given to you at the time of discharge  Make sure that you are splitting your pill or tablet medications in halves or fourths or even crushing them before you take them  Capsules should be opened and mixed with water or jello  You need to do this for at least 4 weeks after surgery  Eventually you will be able to take your medications the regular way as they were prescribed     You will need to consult with your Family Doctor in regards to all your prescribed medication, particularly those for blood pressure and diabetes  As you lose weight, medical conditions may change, requiring an alteration or elimination of the drug dose  DO NOT TAKE BIRTH CONTROL(BC) MEDICATIONS, INSERT BC VAGINAL RINGS, OR PLACE IUD OR ANY OTHER BC METHODS UNTIL 31 DAYS FROM DAY OF DISCHARGE FROM HOSPITAL  THIS PLACES YOU AT HIGH RISK FOR A POTENTIALLY LIFE THREATENING BLOOD CLOT  Remember to always use barrier methods for birth control and speak to your GYN about using two forms of birth control to start 31 days after surgery  It is very important to avoid pregnancy until at least 18-24 months after surgery  INCISION CARE  You may shower and get incisions wet 2 days after surgery  No soaking tub baths or swimming for 30 days after surgery  Keep abdominal area and incisions clean  Use soap and water to create a good lather and rinse off  Do not scrub incisions  If you have a drain, empty the drain as the nurses instructed  FOLLOW-UP APPOINTMENT should be made for one week after discharge  Call surgeon’s office at [phone] to schedule an appointment  CALL YOUR DOCTOR FOR:  pain not controlled by pain medications, a temperature greater than 101 5° F, any increase or change in drainage or redness from any incision, any vomiting or inability to keep liquids down, shortness of breath, shoulder pain, or bleeding        Letter and Information for Patient's Primary Health Care Provider      Dear Manoj Dowling Provider,       Your patient had bariatric surgery on this admission to [address] [place] [address]  Due to the restrictive and/or malabsorptive nature of their procedure our surgeons recommend routine lab studies  Your patients’ surgeon will provide orders initially and ask that they continue to follow-up with us for life even if they see you  As time moves on some patients prefer to follow-up only with their family doctor   We ask that you discuss this regular work-up with them when they make an appointment to see you  *The lab studies recommended to best identify deficiencies are: CBC, CMP, Lipid Profile, Fe, TIBC, %Sat, Vitamin D, Folate, B12, Whole Blood Thiamine, Vitamin A, PTH, Zinc and Ferritin  We recommend HgbA1C for diabetics  *These studies should be done minimally at 6 months and a year post-operatively and then yearly thereafter  *Recommended Daily Supplements: Please see the attached Vitamin Sheet    If your patient has any dietary or psycho-social concerns, they can follow-up with our Team Dietitian and Licensed Clinical   They can reach these team members by calling the [facility]  We also encourage them to follow up at our regularly scheduled support groups or join our [employer] [facility] Bariatric Patient Forum  For your convenience we have also included a list of medicines that should be avoided after weight loss surgery and a list of the vitamin and minerals they should be taking for the rest of their lives  The patients are provided this information as well  The [facility] Bariatric Team would like to thank you for the opportunity to assist in the care of your patient  Feel free to contact us with any questions by calling the [facility] Weight Management office at [phone]    Sincerely,         [facility] Team    Additional Information for Providers and Patients                      Vitamins After Shaila en Y Gastric Bypass or Sleeve Gastrectomy Surgery    Due to the decreased absorption of nutrients and the decreased amount of food eaten it is difficult to obtain all the nutrients needed consuming food  We recommend a bariatric formulated vitamin for the rest of your life  If you wish to use an over the counter vitamins please understand you may not get all the recommended daily requirements  Use the following guidelines for over the counter vitamins  Multivitamins   We recommend 2 chewable multivitamins with iron (do not take gummy chewable as they do not contain thiamine)    You can continue with the chewable or take any well formulated, high potency multivitamin containing 22 nutrients including zinc and copper  If you decide to take a bariatric vitamin the number of vitamins that you need to take will vary  Refer to the chart provided at team meeting    Calcium - Calcium is absorbed in the part of the small bowel that is bypassed in gastric bypass patients  In addition, as you lose weight, you are more at risk for loss of bone density leading to osteoporosis  The best form of calcium is Calcium Citrate  This form of calcium is better absorbed after your surgery  Recommended daily dose is 1500 mg  Take 500 mg in (3) divided doses  You can only absorb about 500 mg at a time  We recommend 2000 IU of vitamin D3 per day, in addition to what is in your calcium supplement  If you were instructed to take a higher dose based on a deficiency, then continue to take the higher vitamin D dose  Iron - Iron is absorbed in the part of the small bowel that is bypassed  You will need to take extra iron in addition to what is already in the multivitamin if you are a menstruating woman (25-45 mg of additional elemental iron) or have been diagnosed with an iron deficiency  We recommend iron in the form of Ferrous Fumarate with Vitamin C  Follow the instructions on the package or bottle unless your physician has given other dosage amounts  B12 (Cyanocobalamin) may also be decreased  Additional Vitamin B12 is recommended if you are not taking a bariatric vitamin  Take 350 to 500 micrograms (mcg) per day of B12 (Cyanocobalamin) in a sublingual form (for under the tongue)     Note:  Calcium interferes with the absorption of iron, so it is recommended that you take the calcium at least 2 hours apart from iron  The tannins in tea also interfere with the absorption of iron  Note: Anti-ulcer medications interfere with the absorption of calcium iron and B12  Space your anti-ulcer medication 2 hours apart from your vitamins  * Based on the recommendations of the ASMBS and the National Osteoporosis foundation    Non-steroidal anti-inflammatory drugs or medications containing them  You should take caution or avoid these medications as they could harm your pouch or sleeve  **This is a sample list and is not all inclusive  Please read labels carefully  **      Non Steroidal anti-inflammatory drugs  Advil (ibuprofen)  Aleve (naproxen)  Anaprox (naproxen)  Ansaid (flurbiprofen)  Azolid (phenylbutazone)  Bextra (valdecoxib)  Butazolidin (phenylbutazone)  Celebrex (celecoxib)  Clinoril (sulindac)  Dolobid (diflunisal)  Excedrin IB (ibuprofen)  Feldene (piroxicam)  Ibuprin (ibuprofen)  Indocin (indomethacin)  Lodine (etodolac)  Meclomen (meclofenamate)  Midol IB (ibuprofen)  Motrin IB (ibuprofen)  Nalfon (fenoprofen)  Naprosyn (naproxen)  Nuprin (ibuprofen)  Orudis (ketoprofen)  Oruvail (ketoprofen)  Pamprin - IB (ibuprofen)  Ponstel (mefenamic acid)  Rexolate (sodium thiosalicylate)  Tandearil (oxyphenbutazone)  Tolectin (tolmetin)  Voltaren (diclofenac)      Barbiturate  Fiorinal (butalbital/aspirin/caffeine)    Salicylates  Amigesic (salsalate)  Anacin (aspirin)  Arthropan (choline salicylate)  Ascriptin (buffered aspirin)  Aspirin (aspirin)  Aspirtab (aspirin)  Bufferin (buffered aspirin)  Disalcid (salsalate)  Ecotrin (aspirin)  Uracel (sodium salicylate)    Analgesics  Equagesic (meprobamate/aspirin)  Micrainin (meprobamate/aspirin)  Percodan (oxycodone/aspirin)    OTC  Pepto-Bismol®  [place]®  Excedrin®    For Gastric Bypass Patients  Extended Release Medications  Sustained Release Medications  Time Released Medications

## 2023-01-18 NOTE — PROGRESS NOTES
Josefina 44  Progress Note - Luz Maria Castillo 1970, 47 y o  female MRN: [id number]  Unit/Bed#: [address] [place] [address] Encounter: [id number]  Primary Care Provider: Lenny Mariscal DO   Date and time admitted to hospital: 1/17/2023  8:03 AM    * New onset type 2 diabetes mellitus [facility])  Assessment & Plan  Lab Results   Component Value Date    HGBA1C 7 4 (H) 01/18/2023       Recent Labs     01/17/23  1727 01/17/23  2315 01/18/23  0711   POCGLU 156* 188* 130       Blood Sugar Average: Last 72 hrs:  (P) 158   · Recently started on metformin  · Discussed with patient to resume metformin once she advances to solid diabetic diet  · Advised to monitor blood sugars before each meal and at bedtime    Obesity, Class III, BMI 40-49 9 (morbid obesity) ([facility] [address] )  Assessment & Plan  · Status post elective laparoscopic Shaila-EN-Y gastric bypass, POD 1  · Patient is tolerating bariatric clear liquid diet    Essential hypertension  Assessment & Plan  · On Norvasc, metoprolol, lisinopril at home  · Continue home medications with holding parameter  · Blood pressure has been on the soft side  · Cussed with patient to monitor blood pressure and restart metoprolol, lisinopril and Norvasc and the daughter    BRAEDEN (obstructive sleep apnea)  Assessment & Plan  · Does not use CPAP at home    Mixed hyperlipidemia  Assessment & Plan  · Resume statin when cleared by primary team        VTE Pharmacologic Prophylaxis: VTE Score: 5 High Risk (Score >/= 5) - Pharmacological DVT Prophylaxis Ordered: enoxaparin (Lovenox)  Sequential Compression Devices Ordered  Patient Centered Rounds: I performed bedside rounds with nursing staff today  Time Spent for Care: 30 minutes  More than 50% of total time spent on counseling and coordination of care as described above      Current Length of Stay: 1 day(s)  Current Patient Status: Inpatient       Code Status: No Order    Subjective:   Patient is tolerating bariatric clear liquid diet well   Denies any abdominal pain, nausea or vomiting  Objective:     Vitals:   Temp (24hrs), Av °F (36 7 °C), Min:97 7 °F (36 5 °C), Max:98 2 °F (36 8 °C)    Temp:  [97 7 °F (36 5 °C)-98 2 °F (36 8 °C)] 98 1 °F (36 7 °C)  HR:  [66-89] 66  Resp:  [16-18] 16  BP: (102-140)/(53-73) 111/63  SpO2:  [91 %-97 %] 95 %  Body mass index is 45 2 kg/m²  Input and Output Summary (last 24 hours): Intake/Output Summary (Last 24 hours) at 2023 1806  Last data filed at 2023 0501  Gross per 24 hour   Intake 360 ml   Output 650 ml   Net -290 ml       Physical Exam:   Physical Exam  Constitutional:       Appearance: Normal appearance  HENT:      Head: Normocephalic and atraumatic  Nose: Nose normal       Mouth/Throat:      Mouth: Mucous membranes are moist       Pharynx: Oropharynx is clear  Eyes:      Extraocular Movements: Extraocular movements intact  Pupils: Pupils are equal, round, and reactive to light  Cardiovascular:      Rate and Rhythm: Normal rate and regular rhythm  Pulmonary:      Effort: Pulmonary effort is normal       Breath sounds: Normal breath sounds  Abdominal:      General: Bowel sounds are normal  There is no distension  Palpations: Abdomen is soft  Tenderness: There is no abdominal tenderness  Musculoskeletal:         General: No swelling  Cervical back: Normal range of motion and neck supple  Skin:     General: Skin is warm and dry  Neurological:      General: No focal deficit present  Mental Status: She is alert           Additional Data:     Labs:  Results from last 7 days   Lab Units 23  0639   WBC Thousand/uL 11 96*   HEMOGLOBIN g/dL 13 1   HEMATOCRIT % 40 0   PLATELETS Thousands/uL 194     Results from last 7 days   Lab Units 23  0639   SODIUM mmol/L 136   POTASSIUM mmol/L 3 9   CHLORIDE mmol/L 102   CO2 mmol/L 25   BUN mg/dL 9   CREATININE mg/dL 0 80   ANION GAP mmol/L 9   CALCIUM mg/dL 8 6   GLUCOSE RANDOM mg/dL 146* Results from last 7 days   Lab Units 01/18/23  0711 01/17/23  2315 01/17/23  1727   POC GLUCOSE mg/dl 130 188* 156*     Results from last 7 days   Lab Units 01/18/23  0639   HEMOGLOBIN A1C % 7 4*           Lines/Drains:  Invasive Devices     None                       Imaging:     Recent Cultures (last 7 days):         Last 24 Hours Medication List:        Today, Patient Was Seen By: Carrillo Hadley MD    **Please Note: This note may have been constructed using a voice recognition system  **

## 2023-01-18 NOTE — PROGRESS NOTES
[facility]  coding opportunities       Chart reviewed, no opportunity found: CHART REVIEWED, NO OPPORTUNITY FOUND        Patients Insurance        Commercial Insurance: Rik Lisa

## 2023-01-18 NOTE — UTILIZATION REVIEW
NOTIFICATION OF ADMISSION DISCHARGE   This is a Notification of Discharge from [facility]  Please be advised that this patient has been discharge from our facility  Below you will find the admission and discharge date and time including the patient’s disposition  UTILIZATION REVIEW CONTACT:  Aakash Holt  Utilization   Network Utilization Review Department  Phone: [phone] x carefully listen to the prompts  All voicemails are confidential   Email: [email or web address]     ADMISSION INFORMATION  PRESENTATION DATE: 1/17/2023  8:03 AM  OBERVATION ADMISSION DATE:   INPATIENT ADMISSION DATE: 1/17/23  1:05 PM   DISCHARGE DATE: 1/18/2023 11:12 AM   DISPOSITION:Home/Self Care    IMPORTANT INFORMATION:  Send all requests for admission clinical reviews, approved or denied determinations and any other requests to dedicated fax number below belonging to the campus where the patient is receiving treatment   List of dedicated fax numbers:  [address] DENIALS (Administrative/Medical Necessity) [phone]   [address] (Maternity/NICU/Pediatrics) [phone]   [facility] [phone]   [place] [address] [phone]   [address] [phone]   [facility] [phone]   [facility] [phone]   [address] [place] [address] 119 [phone]   [facility]  [phone]   [facility] [phone]   [facility] [address] [facility] [phone]

## 2023-01-18 NOTE — NURSING NOTE
Discharge instructions gone over with pt  Prescriptions e-scribed to [facility] aid  No questions  Inge went over bariatric discharge information and provided hand outs to patient  IV removed  Teo tayler    Wheeled to lobby to meet her father who was her

## 2023-01-18 NOTE — ASSESSMENT & PLAN NOTE
Lab Results   Component Value Date    HGBA1C 8 0 (H) 06/27/2022       Recent Labs     01/17/23  1727 01/17/23  2315   POCGLU 156* 188*       Blood Sugar Average: Last 72 hrs:  (P) 172   · Recently started on metformin    · Update A1c  · SSI

## 2023-01-18 NOTE — PLAN OF CARE
Problem: PAIN - ADULT  Goal: Verbalizes/displays adequate comfort level or baseline comfort level  Description: Interventions:  - Encourage patient to monitor pain and request assistance  - Assess pain using appropriate pain scale  - Administer analgesics based on type and severity of pain and evaluate response  - Implement non-pharmacological measures as appropriate and evaluate response  - Consider cultural and social influences on pain and pain management  - Notify physician/advanced practitioner if interventions unsuccessful or patient reports new pain  1/18/2023 0938 by Rayna Rendon RN  Outcome: Completed  1/18/2023 0938 by Rayna Rendon RN  Outcome: Progressing     Problem: INFECTION - ADULT  Goal: Absence or prevention of progression during hospitalization  Description: INTERVENTIONS:  - Assess and monitor for signs and symptoms of infection  - Monitor lab/diagnostic results  - Monitor all insertion sites, i e  indwelling lines, tubes, and drains  - Monitor endotracheal if appropriate and nasal secretions for changes in amount and color  - Currie appropriate cooling/warming therapies per order  - Administer medications as ordered  - Instruct and encourage patient and family to use good hand hygiene technique  - Identify and instruct in appropriate isolation precautions for identified infection/condition  1/18/2023 0938 by Rayna Rendon RN  Outcome: Completed  1/18/2023 0938 by Rayna Rendon RN  Outcome: Progressing  Goal: Absence of fever/infection during neutropenic period  Description: INTERVENTIONS:  - Monitor WBC    1/18/2023 0938 by Rayna Rendon RN  Outcome: Completed  1/18/2023 0938 by Rayna Rendon RN  Outcome: Progressing     Problem: SAFETY ADULT  Goal: Patient will remain free of falls  Description: INTERVENTIONS:  - Educate patient/family on patient safety including physical limitations  - Instruct patient to call for assistance with activity   - Consult OT/PT to assist with strengthening/mobility   - Keep Call bell within reach  - Keep bed low and locked with side rails adjusted as appropriate  - Keep care items and personal belongings within reach  - Initiate and maintain comfort rounds  - Make Fall Risk Sign visible to staff  - Apply yellow socks and bracelet for high fall risk patients  - Consider moving patient to room near nurses station  1/18/2023 0938 by Willis Jack RN  Outcome: Completed  1/18/2023 0938 by Willis Jack RN  Outcome: Progressing  Goal: Maintain or return to baseline ADL function  Description: INTERVENTIONS:  -  Assess patient's ability to carry out ADLs; assess patient's baseline for ADL function and identify physical deficits which impact ability to perform ADLs (bathing, care of mouth/teeth, toileting, grooming, dressing, etc )  - Assess/evaluate cause of self-care deficits   - Assess range of motion  - Assess patient's mobility; develop plan if impaired  - Assess patient's need for assistive devices and provide as appropriate  - Encourage maximum independence but intervene and supervise when necessary  - Involve family in performance of ADLs  - Assess for home care needs following discharge   - Consider OT consult to assist with ADL evaluation and planning for discharge  - Provide patient education as appropriate  1/18/2023 0938 by Willis Jack RN  Outcome: Completed  1/18/2023 0938 by Willis Jack RN  Outcome: Progressing  Goal: Maintains/Returns to pre admission functional level  Description: INTERVENTIONS:  - Perform BMAT or MOVE assessment daily    - Set and communicate daily mobility goal to care team and patient/family/caregiver     - Collaborate with rehabilitation services on mobility goals if consulted  - Out of bed for toileting  - Record patient progress and toleration of activity level   1/18/2023 0938 by Willis Jack RN  Outcome: Completed  1/18/2023 0938 by Willis Jack RN  Outcome: Progressing     Problem: DISCHARGE PLANNING  Goal: Discharge to home or other facility with appropriate resources  Description: INTERVENTIONS:  - Identify barriers to discharge w/patient and caregiver  - Arrange for needed discharge resources and transportation as appropriate  - Identify discharge learning needs (meds, wound care, etc )  - Arrange for interpretive services to assist at discharge as needed  - Refer to Case Management Department for coordinating discharge planning if the patient needs post-hospital services based on physician/advanced practitioner order or complex needs related to functional status, cognitive ability, or social support system  1/18/2023 0938 by Megan Avila RN  Outcome: Completed  1/18/2023 0938 by Megan Avila RN  Outcome: Progressing     Problem: Knowledge Deficit  Goal: Patient/family/caregiver demonstrates understanding of disease process, treatment plan, medications, and discharge instructions  Description: Complete learning assessment and assess knowledge base    Interventions:  - Provide teaching at level of understanding  - Provide teaching via preferred learning methods  1/18/2023 0938 by Megan Avila RN  Outcome: Completed  1/18/2023 0938 by Megan Avila RN  Outcome: Progressing

## 2023-01-18 NOTE — ASSESSMENT & PLAN NOTE
· On Norvasc, metoprolol, lisinopril at home  · Continue home medications with holding parameter    · BP acceptable

## 2023-01-18 NOTE — DISCHARGE SUMMARY
Discharge Summary - Divya Montague 47 y o  female MRN: [id number]    Unit/Bed#: [address] [place] [address] Encounter: [id number]      Pre-Operative Diagnosis: Pre-Op Diagnosis Codes:     * Morbid obesity ([facility] ) [E66 01]     * Diabetes mellitus ([facility] ) [E11 9]     * Essential hypertension, malignant [I10]    Post-Operative Diagnosis: Post-Op Diagnosis Codes:     * Morbid obesity ([facility] ) [E66 01]     * Diabetes mellitus ([facility] ) [E11 9]     * Essential hypertension, malignant [I10]    Procedures Performed:  Procedure(s):  LAPAROSCOPIC ALEAH-EN-Y GASTRIC BYPASS AND INTRAOPERATIVE EGD  LYSIS ADHESIONS    Surgeon: Rigo Kwok MD    See H & P for full details of admission and Operative Note for full details of operations performed  Hospital Course:  Patient was admitted for a Laparoscopic Aleah-En-Y Gastric Bypass  Post operatively pain was controlled with oral analgesics and the patient is ambulating/micturating without difficulty  Vital signs and lab work were stable  The patient is tolerating clear liquid diet without nausea or vomiting  The patient is cleared for D/C by the surgeon on POD1  Patient was seen and examined prior to discharge  Provisions for Follow-Up Care:  See After Visit Summary for information related to follow-up care and home orders  Disposition: Home, in stable condition  Patient should refer to "Discharge Instructions" for further information  Planned Readmission: No    Discharge Medications:  See After Visit Summary for reconciled discharge medications provided to patient and family  Post Operative instructions: Reviewed with patient and/or family  This text is generated with voice recognition software  There may be translation, syntax,  or grammatical errors  If you have any questions, please contact the dictating provider       Signature:   Hilary Morrison PA-C  Date: 1/18/2023 Time: 8:35 AM

## 2023-01-18 NOTE — CONSULTS
[address] [place] [address] Dr White 1970, 47 y o  female MRN: [id number]  Unit/Bed#: [address] [address] Encounter: [id number]  Primary Care Provider: Hayde Barros DO   Date and time admitted to hospital: 1/17/2023  8:03 AM    Inpatient consult to Internal Medicine  Consult performed by: JEEVAN Velazquez  Consult ordered by: Shelley Dailey PA-C          * New onset type 2 diabetes mellitus [facility])  Assessment & Plan  Lab Results   Component Value Date    HGBA1C 8 0 (H) 06/27/2022       Recent Labs     01/17/23  1727 01/17/23  2315   POCGLU 156* 188*       Blood Sugar Average: Last 72 hrs:  (P) 172   · Recently started on metformin  · Update A1c  · SSI    Essential hypertension  Assessment & Plan  · On Norvasc, metoprolol, lisinopril at home  · Continue home medications with holding parameter  · BP acceptable    Obesity, Class III, BMI 40-49 9 (morbid obesity) ([facility] )  Assessment & Plan  · Status post elective laparoscopic Shaila-EN-Y gastric bypass, POD 0   · Diet, IV fluids, pain management, DVT prophylaxis per primary    BRAEDEN (obstructive sleep apnea)  Assessment & Plan  · Does not use CPAP at home    Mixed hyperlipidemia  Assessment & Plan  · Holding statin per primary      VTE Prophylaxis: Sequential compression device (Venodyne)   / sequential compression device     Recommendations for Discharge:  · Follow-up surgery, PCP    Counseling / Coordination of Care Time: 30 minutes  Greater than 50% of total time spent on patient counseling and coordination of care  Collaboration of Care: Were Recommendations Directly Discussed with Primary Treatment Team? - No     History of Present Illness:    Guero Turner is a 47 y o  female with PMH of type 2 diabetes, hypertension, hyperlipidemia, morbid obesity, sleep apnea who is originally admitted to the bariatric surgery service on 1/17/2023 due to  elective gastric bypass  We are consulted for management of type 2 diabetes, hypertension  Patient denies nausea vomiting SOB headache dizziness fever chills  Reports mild gas pain in abdomen  Reports having difficulty urinating after anesthesia which she has history of  Patient offered no other complaints  Review of Systems:    Review of Systems   Gastrointestinal:        Gas discomfort in abdomen   Genitourinary: Positive for difficulty urinating  All other systems reviewed and are negative  Past Medical and Surgical History:     Past Medical History:   Diagnosis Date   • Asthma    • Broken tooth     upper right-back   • High cholesterol    • Hypertension    • Morbid obesity with BMI of 45 0-49 9, adult (HCC)    • Pain in right knee     MRI on 22   • Sleep apnea     no c pap needed       Past Surgical History:   Procedure Laterality Date   •  SECTION     • EGD  2022    food in the stomach-repeat EGD on 22   • LAPAROSCOPY     • NE ARTHRS KNE SURG W/MENISCECTOMY MED/LAT W/SHVG Right 2022    Procedure: KNEE ARTHROSCOPY  MEDIAL MENISCECTOMY AND CHONDRYLPLASTY;  Surgeon: Merrily Nissen, MD;  Location: [facility];  Service: Orthopedics   • TONSILLECTOMY     • WISDOM TOOTH EXTRACTION         Meds/Allergies:    all medications and allergies reviewed    Allergies: No Known Allergies    Social History:     Marital Status:      Substance Use History:   Social History     Substance and Sexual Activity   Alcohol Use Yes    Comment: rare     Social History     Tobacco Use   Smoking Status Never   Smokeless Tobacco Never     Social History     Substance and Sexual Activity   Drug Use Never       Family History:    non-contributory    Physical Exam:     Vitals:   Blood Pressure: 140/73 (23)  Pulse: 89 (23)  Temperature: 98 °F (36 7 °C) (23)  Temp Source: Oral (23)  Respirations: 18 (23)  Height: 5' 7" (170 2 cm) (23)  Weight - Scale: 131 kg (288 lb 9 6 oz) (2318)  SpO2: 91 % (23 6558)    Physical Exam  Vitals and nursing note reviewed  Constitutional:       Appearance: She is well-developed  She is obese  HENT:      Head: Normocephalic and atraumatic  Neck:      Thyroid: No thyromegaly  Vascular: No JVD  Trachea: No tracheal deviation  Cardiovascular:      Rate and Rhythm: Normal rate and regular rhythm  Heart sounds: Normal heart sounds  Pulmonary:      Effort: Pulmonary effort is normal  No respiratory distress  Breath sounds: Normal breath sounds  No wheezing or rales  Abdominal:      General: Bowel sounds are normal  There is no distension  Palpations: Abdomen is soft  Tenderness: There is no abdominal tenderness  There is no guarding  Comments: Dermabond dressing intact  Musculoskeletal:         General: No swelling or deformity  Cervical back: Neck supple  Right lower leg: No edema  Left lower leg: No edema  Skin:     General: Skin is warm and dry  Neurological:      General: No focal deficit present  Mental Status: She is alert and oriented to person, place, and time  Psychiatric:         Mood and Affect: Mood normal          Judgment: Judgment normal            Additional Data:     Lab Results: I Have Reviewed All Lab Data Below: Invalid input(s): LABALBU        * Additional Pertinent Lab Tests Reviewed: [address] [facility] Admission Reviewed    Imaging: I have personally reviewed pertinent reports  No results found        ** Please Note: Dragon 360 Dictation speech to text software may have been used in the creation of this document **

## 2023-01-18 NOTE — ASSESSMENT & PLAN NOTE
· Status post elective laparoscopic Shaila-EN-Y gastric bypass, POD 0   · Diet, IV fluids, pain management, DVT prophylaxis per primary

## 2023-01-19 ENCOUNTER — TELEPHONE (OUTPATIENT)
Dept: BARIATRICS | Facility: CLINIC | Age: 53
End: 2023-01-19

## 2023-01-23 PROBLEM — K91.2 POSTSURGICAL MALABSORPTION: Status: ACTIVE | Noted: 2023-01-23

## 2023-01-23 PROBLEM — Z48.815 ENCOUNTER FOR SURGICAL AFTERCARE FOLLOWING SURGERY OF DIGESTIVE SYSTEM: Status: ACTIVE | Noted: 2023-01-23

## 2023-01-23 NOTE — PROGRESS NOTES
FIRST POST-OPERATIVE VISIT - BARIATRIC SURGERY  Goldy Mederos 47 y o  female MRN: [id number]  Unit/Bed#:  Encounter: [id number]      HPI:  Goldy Mederos is a 47 y o  female who presents for the 1st postoperative visit following a laparoscopic Aleah-en-Y gastric bypass with Dr Rita White on 23  Tolerating adequate PO intake, ambulating regularly, using IS, voiding, +F/BM  Denies n/v/cp/sob/dizziness  Throat feels irritated with swallowing some crushed pills  Review of Systems   Constitutional: Negative for chills and fever  Unexpected weight change: planned weight loss  HENT: Positive for sore throat  Negative for trouble swallowing  Respiratory: Negative for cough and shortness of breath  Cardiovascular: Negative for chest pain and palpitations  Gastrointestinal: Negative for abdominal pain, constipation, diarrhea, nausea and vomiting  Skin: Positive for wound  Neurological: Negative for dizziness     Psychiatric/Behavioral:        Denies anxiety and depression       Historical Information   Past Medical History:   Diagnosis Date   • Asthma    • Broken tooth     upper right-back   • High cholesterol    • Hypertension    • Morbid obesity with BMI of 45 0-49 9, adult (HCC)    • Pain in right knee     MRI on 22   • Sleep apnea     no c pap needed     Past Surgical History:   Procedure Laterality Date   • ABDOMINAL ADHESION SURGERY N/A 2023    Procedure: LYSIS ADHESIONS;  Surgeon: Soledad Silverio MD;  Location: [address] [place] [address];  Service: Bariatrics   •  SECTION     • EGD  2022    food in the stomach-repeat EGD on 22   • LAPAROSCOPY     • [place] ARTHRS KNE SURG W/MENISCECTOMY MED/LAT W/SHVG Right 2022    Procedure: KNEE ARTHROSCOPY  [address];  Surgeon: Lyla Corbett MD;  Location: [address] [place] [address];  Service: Orthopedics   • [place] LAPS GSTR RSTCV [address] W/BYP ALEAH-EN-Y LIMB <150 CM N/A 2023    Procedure: LAPAROSCOPIC ALEAH-EN-Y GASTRIC BYPASS AND INTRAOPERATIVE EGD;  Surgeon: Myrna Marcelino MD;  Location: [address] [place] [address];  Service: Bariatrics   • TONSILLECTOMY     • WISDOM TOOTH EXTRACTION       Social History   Social History     Substance and Sexual Activity   Alcohol Use Yes    Comment: rare     Social History     Substance and Sexual Activity   Drug Use Never     Social History     Tobacco Use   Smoking Status Never   Smokeless Tobacco Never     Family History: non-contributory    Meds/Allergies   all medications and allergies reviewed  No Known Allergies    Objective     Current Vitals:   Blood Pressure: 120/82 (02/01/23 1034)  Pulse: 83 (02/01/23 1034)  Temperature: 97 9 °F (36 6 °C) (02/01/23 1034)  Height: 5' 7" (170 2 cm) (02/01/23 1034)  Weight - Scale: 125 kg (275 lb 12 8 oz) (02/01/23 1034)     Physical Exam  Vitals reviewed  Constitutional:       General: She is not in acute distress  Appearance: She is well-developed  HENT:      Head: Normocephalic and atraumatic  Eyes:      General: No scleral icterus  Cardiovascular:      Rate and Rhythm: Normal rate and regular rhythm  Pulmonary:      Effort: Pulmonary effort is normal  No respiratory distress  Abdominal:      General: There is no distension  Palpations: Abdomen is soft  Tenderness: There is no abdominal tenderness  Comments: Incisions C/D/I, no signs of infection   Skin:     General: Skin is warm and dry  Neurological:      Mental Status: She is alert and oriented to person, place, and time  Psychiatric:         Mood and Affect: Mood normal          Behavior: Behavior normal            Assessment/Plan :    Patient is presenting for the first postoperative visit, patient hospital stay was uneventful without any complications, patient is doing well, has no complaints, is taking vitamins as instructed, currently tolerating the blenderized diet, will advance to soft diet       Patient will also be meeting with our dietician today to review her vitamin and mineral supplements and also go over her diet and emphasize postoperative commitment and compliance  The patient was also instructed to start exercising on a regular basis  However, I recommended no heavy lifting, or weight exercises for another 2 weeks  F/U at the 5 week global class and 3 month appointment  Labs for the 3 month appointment were ordered  Patient was instructed to call if develops nausea, vomiting, fever or chills

## 2023-01-25 ENCOUNTER — OFFICE VISIT (OUTPATIENT)
Dept: FAMILY MEDICINE CLINIC | Facility: CLINIC | Age: 53
End: 2023-01-25

## 2023-01-25 VITALS
HEART RATE: 74 BPM | DIASTOLIC BLOOD PRESSURE: 88 MMHG | TEMPERATURE: 97.6 F | SYSTOLIC BLOOD PRESSURE: 118 MMHG | HEIGHT: 67 IN | WEIGHT: 282.2 LBS | BODY MASS INDEX: 44.29 KG/M2 | RESPIRATION RATE: 17 BRPM

## 2023-01-25 DIAGNOSIS — I10 ESSENTIAL HYPERTENSION: ICD-10-CM

## 2023-01-25 DIAGNOSIS — Z12.11 SCREEN FOR COLON CANCER: ICD-10-CM

## 2023-01-25 DIAGNOSIS — E66.01 OBESITY, CLASS III, BMI 40-49.9 (MORBID OBESITY) (HCC): Primary | ICD-10-CM

## 2023-01-25 DIAGNOSIS — R30.0 DYSURIA: ICD-10-CM

## 2023-01-25 DIAGNOSIS — Z12.31 SCREENING MAMMOGRAM FOR HIGH-RISK PATIENT: ICD-10-CM

## 2023-01-25 DIAGNOSIS — Z98.84 S/P BARIATRIC SURGERY: ICD-10-CM

## 2023-01-25 DIAGNOSIS — K91.2 POSTSURGICAL MALABSORPTION: ICD-10-CM

## 2023-01-25 DIAGNOSIS — E11.9 NEW ONSET TYPE 2 DIABETES MELLITUS (HCC): ICD-10-CM

## 2023-01-25 DIAGNOSIS — E78.2 MIXED HYPERLIPIDEMIA: ICD-10-CM

## 2023-01-25 PROBLEM — Z48.815 ENCOUNTER FOR SURGICAL AFTERCARE FOLLOWING SURGERY OF DIGESTIVE SYSTEM: Status: RESOLVED | Noted: 2023-01-23 | Resolved: 2023-01-25

## 2023-01-25 LAB
SL AMB  POCT GLUCOSE, UA: NEGATIVE
SL AMB LEUKOCYTE ESTERASE,UA: NEGATIVE
SL AMB POCT BILIRUBIN,UA: NORMAL
SL AMB POCT BLOOD,UA: NORMAL
SL AMB POCT CLARITY,UA: NORMAL
SL AMB POCT COLOR,UA: NORMAL
SL AMB POCT KETONES,UA: NORMAL
SL AMB POCT NITRITE,UA: NEGATIVE
SL AMB POCT PH,UA: 6
SL AMB POCT SPECIFIC GRAVITY,UA: 1.01
SL AMB POCT URINE PROTEIN: NEGATIVE
SL AMB POCT UROBILINOGEN: NORMAL

## 2023-01-25 RX ORDER — AMLODIPINE BESYLATE 5 MG/1
5 TABLET ORAL DAILY
Qty: 90 TABLET | Refills: 1
Start: 2023-01-25 | End: 2023-02-08

## 2023-01-25 NOTE — PROGRESS NOTES
Assessment/Plan:    1  Obesity, Class III, BMI 40-49 9 (morbid obesity) ([facility] )    2  S/P bariatric surgery    3  Dysuria  -     POCT urine dip auto non-scope    4  Postsurgical malabsorption    5  New onset type 2 diabetes mellitus ([facility] )  -     Comprehensive metabolic panel; Future; Expected date: 04/10/2023  -     Hemoglobin A1C; Future; Expected date: 04/10/2023  -     Microalbumin / creatinine urine ratio; Future; Expected date: 04/10/2023  -     Comprehensive metabolic panel  -     Hemoglobin A1C  -     Microalbumin / creatinine urine ratio    6  Essential hypertension  Assessment & Plan:  bp is stable    Orders:  -     amLODIPine (NORVASC) 5 mg tablet; Take 1 tablet (5 mg total) by mouth daily    7  Mixed hyperlipidemia  -     Lipid Panel with Direct LDL reflex; Future; Expected date: 04/10/2023  -     Lipid Panel with Direct LDL reflex    8  Screen for colon cancer  -     Ambulatory referral for colonoscopy; Future    9  Screening mammogram for high-risk patient  -     Mammo screening bilateral w 3d & cad; Future; Expected date: 01/25/2023        UA is negative for UTI    P[t can stay on her BB  Will hold the statin and ace for now  I will get labs in three months and see what needs to be restarted  Pt is also taking the norvasc at this time at 5 mg    There are no Patient Instructions on file for this visit  Return in about 3 months (around 4/25/2023) for Recheck  Subjective:      Patient ID: Alcides Adams is a 47 y o  female  Chief Complaint   Patient presents with   • Transition of [address] f/u New onset type 2 diabetes mellitus nm  lpn       Pt is sched for a TCM appt  Nurses TCM note appreciated  PT s/p Gastric bypass    Pt states states she feels great after the surgery she is wondering if she has a UTI  Pt states when she urinates she feels like she has a lot of pain in her lower abdomen   Pt states she had the issue while she was in the hospital   She initially had issues with some retention  Pt is having BM and is urinationg  No blood in urine or BM    Pt states since surgery she lost 6 lbs    Pt is not on lisinopril  Is taking the metoprolol    Pt states she had a hard time actually swallowing any of her meds - thinks the intuibation swelled her throat  Pt is still taking norvasc although it is not on her med list      The following portions of the patient's history were reviewed and updated as appropriate: allergies, current medications, past family history, past medical history, past social history, past surgical history and problem list     Review of Systems   Constitutional: Negative  Negative for activity change, appetite change, chills, diaphoresis and fatigue  HENT: Negative  Negative for dental problem, ear pain, sinus pressure and sore throat  Eyes: Negative  Negative for photophobia, pain, discharge, redness, itching and visual disturbance  Respiratory: Negative for apnea and chest tightness  Cardiovascular: Negative  Negative for chest pain, palpitations and leg swelling  Gastrointestinal: Negative  Negative for abdominal distention, abdominal pain, constipation and diarrhea  Endocrine: Negative  Negative for cold intolerance and heat intolerance  Genitourinary: Positive for dysuria  Negative for difficulty urinating and dyspareunia  Musculoskeletal: Negative  Negative for arthralgias and back pain  Skin: Negative  Allergic/Immunologic: Negative for environmental allergies  Neurological: Negative  Negative for dizziness  Psychiatric/Behavioral: Negative  Negative for agitation           Current Outpatient Medications   Medication Sig Dispense Refill   • acetaminophen (TYLENOL) 325 mg tablet Take 3 tablets (975 mg total) by mouth every 8 (eight) hours for 7 days Must cut or crush tabs 63 tablet 0   • amLODIPine (NORVASC) 5 mg tablet Take 1 tablet (5 mg total) by mouth daily 90 tablet 1   • metoprolol tartrate (LOPRESSOR) 25 mg tablet Take 1 tablet (25 mg total) by mouth every 12 (twelve) hours 180 tablet 1   • ondansetron (ZOFRAN) 4 mg tablet Take 1 tablet (4 mg total) by mouth every 8 (eight) hours as needed for nausea or vomiting 20 tablet 0   • pantoprazole (PROTONIX) 40 mg tablet Take 1 tablet (40 mg total) by mouth daily 90 tablet 1     No current facility-administered medications for this visit  Objective:    /88   Pulse 74   Temp 97 6 °F (36 4 °C)   Resp 17   Ht 5' 7" (1 702 m)   Wt 128 kg (282 lb 3 2 oz)   BMI 44 20 kg/m²        Physical Exam  Vitals and nursing note reviewed  Constitutional:       General: She is not in acute distress  Appearance: She is well-developed  She is obese  She is not diaphoretic  HENT:      Head: Normocephalic and atraumatic  Right Ear: External ear normal       Left Ear: External ear normal       Nose: Nose normal       Mouth/Throat:      Pharynx: No oropharyngeal exudate  Eyes:      General: No scleral icterus  Right eye: No discharge  Left eye: No discharge  Pupils: Pupils are equal, round, and reactive to light  Neck:      Thyroid: No thyromegaly  Cardiovascular:      Rate and Rhythm: Normal rate  Heart sounds: Normal heart sounds  No murmur heard  Pulmonary:      Effort: Pulmonary effort is normal  No respiratory distress  Breath sounds: Normal breath sounds  No wheezing  Abdominal:      General: Bowel sounds are normal  There is no distension  Palpations: Abdomen is soft  There is no mass  Tenderness: There is no abdominal tenderness  There is no guarding or rebound  Musculoskeletal:         General: Normal range of motion  Skin:     General: Skin is warm and dry  Findings: No erythema or rash  Neurological:      Mental Status: She is alert        Coordination: Coordination normal       Deep Tendon Reflexes: Reflexes normal    Psychiatric:         Behavior: Behavior normal               TCM Call     Date and time call was made  1/18/2023 11:14 AM    Hospital care reviewed  Records reviewed    Patient was hospit[place]  [facility] [place]    Date of Admission  01/17/23    Date of discharge  01/18/23    Diagnosis  New onset type 2 diabetes mellitus    Disposition  Home    Were the patients medications reviewed and updated  Yes    Current Symptoms  --  diarrhea which she states her surgeon was aware of      TCM Call     Post hospital issues  None    Should patient be enrolled in anticoag monitoring? No    Scheduled for follow up? Yes    Patients specialists  Other (comment)    Other specialists names  Dr Jose G Goode    Did you obtain your prescribed medications  Yes    Do you need help managing your prescriptions or medications  No    Is transportation to your appointment needed  No    I have advised the patient to call PCP with any new or worsening symptoms  Dianna Shaikh  Family    The type of support provided  Physical; Emotional    Do you have social support  Yes, as much as I need    Are you recieving any outpatient services  No    Are you recieving home care services  No    Interperter language line needed  No    Counseling  Patient    Counseling topics  Activities of daily living; Importance of RX compliance; patient and family education; instructions for management; Risk factor reduction    Comments  I spoke with Crow Alvarez who states she is doing well  Her pain is controlled  She is afebrile  She has been having diarrhea which her surgeon was aware of   She knows to call if any fevers, uncontrolled pain, vomiting, etc [facility] Mary Anne, DO

## 2023-01-27 ENCOUNTER — OFFICE VISIT (OUTPATIENT)
Dept: BARIATRICS | Facility: CLINIC | Age: 53
End: 2023-01-27

## 2023-01-27 VITALS — HEIGHT: 67 IN | WEIGHT: 279.2 LBS | BODY MASS INDEX: 43.82 KG/M2

## 2023-01-27 DIAGNOSIS — K91.2 POSTSURGICAL MALABSORPTION: Primary | ICD-10-CM

## 2023-01-27 NOTE — PROGRESS NOTES
Weight Management Nutrition Class     Diagnosis: Morbid Obesity    Bariatric Surgeon: Dr Howard Stout     Surgery: Gastric Bypass Laparoscopic    Class: first post op note    Topics discussed today include:     fluid goals post op, protein goals post op, constipation, chew food well, exercise, avoidance of alcohol, PPI use, diet progression, hypoglycemia, dumping syndrome, protein supplems, vitamin/mineral supplements and calcium supplements    Patient was able to verbalize basic diet (protein, fluid, vitamin and mineral) recommendations and possible nutrition-related complications  Yes     No longer taking miralax because having a BM every day which started to be normal on Wednesday  On pureed:  [address], refried beans, yogurt  2 meals--1/4 cup  1 protein shake per day, but today did already get one it so [facility] will [facility] 2nd [facility]  Getting at least 48oz of water per day + 1 protein shake (11oz), some days gets up to 64oz water  Does drink from a straw--advised against      Vitamins:  BA EA 1 BID  BA and Baripal chewy calcium 1 TID  Spacing every thing by 2 hrs  No issues with vomiting or diarrhea, normal BMs, very little nausea, but did take multi this morning w/o food  Taking pantoprazole every morning

## 2023-01-31 DIAGNOSIS — Z12.11 SCREENING FOR COLON CANCER: Primary | ICD-10-CM

## 2023-02-01 ENCOUNTER — OFFICE VISIT (OUTPATIENT)
Dept: BARIATRICS | Facility: CLINIC | Age: 53
End: 2023-02-01

## 2023-02-01 VITALS
TEMPERATURE: 97.9 F | WEIGHT: 275.8 LBS | HEIGHT: 67 IN | SYSTOLIC BLOOD PRESSURE: 120 MMHG | DIASTOLIC BLOOD PRESSURE: 82 MMHG | BODY MASS INDEX: 43.29 KG/M2 | HEART RATE: 83 BPM

## 2023-02-01 DIAGNOSIS — G47.33 OSA (OBSTRUCTIVE SLEEP APNEA): ICD-10-CM

## 2023-02-01 DIAGNOSIS — I10 ESSENTIAL HYPERTENSION: ICD-10-CM

## 2023-02-01 DIAGNOSIS — K91.2 POSTSURGICAL MALABSORPTION: ICD-10-CM

## 2023-02-01 DIAGNOSIS — Z12.11 SCREENING FOR COLON CANCER: Primary | ICD-10-CM

## 2023-02-01 DIAGNOSIS — E11.9 NEW ONSET TYPE 2 DIABETES MELLITUS (HCC): ICD-10-CM

## 2023-02-01 DIAGNOSIS — Z48.815 ENCOUNTER FOR SURGICAL AFTERCARE FOLLOWING SURGERY OF DIGESTIVE SYSTEM: ICD-10-CM

## 2023-02-01 DIAGNOSIS — E78.2 MIXED HYPERLIPIDEMIA: ICD-10-CM

## 2023-02-08 DIAGNOSIS — I10 ESSENTIAL HYPERTENSION: ICD-10-CM

## 2023-02-08 RX ORDER — AMLODIPINE BESYLATE 5 MG/1
TABLET ORAL
Qty: 90 TABLET | Refills: 1 | Status: SHIPPED | OUTPATIENT
Start: 2023-02-08

## 2023-02-27 ENCOUNTER — CLINICAL SUPPORT (OUTPATIENT)
Dept: BARIATRICS | Facility: CLINIC | Age: 53
End: 2023-02-27

## 2023-02-27 VITALS — WEIGHT: 266.4 LBS | BODY MASS INDEX: 41.81 KG/M2 | HEIGHT: 67 IN

## 2023-02-27 DIAGNOSIS — K91.2 POSTSURGICAL MALABSORPTION: Primary | ICD-10-CM

## 2023-02-27 NOTE — PROGRESS NOTES
Weight Management Nutrition Class     Diagnosis: Morbid Obesity    Bariatric Surgeon: Dr Tianna Wagoner     Surgery: Gastric Bypass Laparoscopic    Class: 5 week post op     Topics discussed today include:     fluid goals post op, protein goals post op, constipation, chew food well, exercise, avoidance of alcohol, PPI use, diet progression, hypoglycemia, dumping syndrome, protein supplems, vitamin/mineral supplements and calcium supplements    Patient was able to verbalize basic diet (protein, fluid, vitamin and mineral) recommendations and possible nutrition-related complications   Yes

## 2023-03-20 DIAGNOSIS — I10 ESSENTIAL HYPERTENSION: ICD-10-CM

## 2023-03-21 RX ORDER — LISINOPRIL 10 MG/1
TABLET ORAL
Qty: 90 TABLET | Refills: 1 | Status: SHIPPED | OUTPATIENT
Start: 2023-03-21

## 2023-03-22 ENCOUNTER — OFFICE VISIT (OUTPATIENT)
Dept: FAMILY MEDICINE CLINIC | Facility: CLINIC | Age: 53
End: 2023-03-22

## 2023-03-22 VITALS
TEMPERATURE: 97.8 F | BODY MASS INDEX: 40.02 KG/M2 | SYSTOLIC BLOOD PRESSURE: 112 MMHG | HEIGHT: 67 IN | RESPIRATION RATE: 18 BRPM | WEIGHT: 255 LBS | HEART RATE: 94 BPM | OXYGEN SATURATION: 94 % | DIASTOLIC BLOOD PRESSURE: 82 MMHG

## 2023-03-22 DIAGNOSIS — U07.1 COVID-19 VIRUS INFECTION: Primary | ICD-10-CM

## 2023-03-22 RX ORDER — NIRMATRELVIR AND RITONAVIR 300-100 MG
3 KIT ORAL 2 TIMES DAILY
Qty: 30 TABLET | Refills: 0 | Status: SHIPPED | OUTPATIENT
Start: 2023-03-22 | End: 2023-03-27

## 2023-03-22 RX ORDER — BENZONATATE 200 MG/1
200 CAPSULE ORAL 3 TIMES DAILY PRN
Qty: 20 CAPSULE | Refills: 0 | Status: SHIPPED | OUTPATIENT
Start: 2023-03-22

## 2023-03-22 NOTE — PROGRESS NOTES
COVID-19 Outpatient Progress Note    Assessment/Plan:    Problem List Items Addressed This Visit    None  Visit Diagnoses     COVID-19 virus infection    -  Primary    Relevant Medications    nirmatrelvir & ritonavir (Paxlovid, 300/100,) tablet therapy pack    benzonatate (TESSALON) 200 MG capsule           Disposition:     Patient has asymptomatic or mild COVID-19 infection  Based off CDC guidelines, they were recommended to isolate for 5 days  If they are asymptomatic or symptoms are improving with no fevers in the past 24 hours, isolation may be ended followed by 5 days of wearing a mask when around othes to minimize risk of infecting others  If still have a fever or other symptoms have not improved, continue to isolate until they improve  Regardless of when they end isolation, avoid being around people who are more likely to get very sick from COVID-19 until at least day 11  Discussed symptom directed medication options with patient  Discussed vitamin D, vitamin C, and/or zinc supplementation with patient  Patient meets criteria for PAXLOVID and they have been counseled appropriately according to EUA documentation released by the FDA  After discussion, patient agrees to treatment  [address] is an investigational medicine used to treat mild-to-moderate COVID-19 in adults and children (15years of age and older weighing at least 80 pounds (40 kg)) with positive results of direct SARS-CoV-2 viral testing, and who are at high risk for progression to severe COVID-19, including hospitalization or death  PAXLOVID is investigational because it is still being studied  There is limited information about the safety and effectiveness of using PAXLOVID to treat people with mild-to-moderate COVID-19      The FDA has authorized the emergency use of PAXLOVID for the treatment of mild-tomoderate COVID-19 in adults and children (15years of age and older weighing at least 80 pounds (40 kg)) with a positive test for the virus that causes COVID-19, and who are at high risk for progression to severe COVID-19, including hospitalization or death, under an EUA  What should I tell my healthcare provider before I take PAXLOVID? Tell your healthcare provider if you:  - Have any allergies  - Have liver or kidney disease  - Are pregnant or plan to become pregnant  - Are breastfeeding a child  - Have any serious illnesses    Tell your healthcare provider about all the medicines you take, including prescription and over-the-counter medicines, vitamins, and herbal supplements  Some medicines may interact with PAXLOVID and may cause serious side effects  Keep a list of your medicines to show your healthcare provider and pharmacist when you get a new medicine  You can ask your healthcare provider or pharmacist for a list of medicines that interact with PAXLOVID  Do not start taking a new medicine without telling your healthcare provider  Your healthcare provider can tell you if it is safe to take PAXLOVID with other medicines  Tell your healthcare provider if you are taking combined hormonal contraceptive  PAXLOVID may affect how your birth control pills work  Females who are able to become pregnant should use another effective alternative form of contraception or an additional barrier method of contraception  Talk to your healthcare provider if you have any questions about contraceptive methods that might be right for you  How do I take PAXLOVID? PAXLOVID consists of 2 medicines: nirmatrelvir and ritonavir  - Take 2 pink tablets of nirmatrelvir with 1 white tablet of ritonavir by mouth 2 times each day (in the morning and in the evening) for 5 days  For each dose, take all 3 tablets at the same time  - If you have kidney disease, talk to your healthcare provider  You may need a different dose  - Swallow the tablets whole  Do not chew, break, or crush the tablets  - Take PAXLOVID with or without food    - Do not stop taking PAXLOVID without talking to your healthcare provider, even if you feel better  - If you miss a dose of PAXLOVID within 8 hours of the time it is usually taken, take it as soon as you remember  If you miss a dose by more than 8 hours, skip the missed dose and take the next dose at your regular time  Do not take 2 doses of PAXLOVID at the same time  - If you take too much PAXLOVID, call your healthcare provider or go to the nearest hospital emergency room right away  - If you are taking a ritonavir- or cobicistat-containing medicine to treat hepatitis C or Human Immunodeficiency Virus (HIV), you should continue to take your medicine as prescribed by your healthcare provider   - Talk to your healthcare provider if you do not feel better or if you feel worse after 5 days  Who should generally not take PAXLOVID? Do not take PAXLOVID if:  You are allergic to nirmatrelvir, ritonavir, or any of the ingredients in PAXLOVID  You are taking any of the following medicines:  - Alfuzosin  - Pethidine, piroxicam, propoxyphene  - Ranolazine  - Amiodarone, dronedarone, flecainide, propafenone, quinidine  - Colchicine  - Lurasidone, pimozide, clozapine  - Dihydroergotamine, ergotamine, methylergonovine  - Lovastatin, simvastatin  - Sildenafil (Revatio®) for pulmonary arterial hypertension (PAH)  - Triazolam, oral midazolam  - Apalutamide  - Carbamazepine, phenobarbital, phenytoin  - Rifampin  - St  Mahendra’s Wort (hypericum perforatum)    What are the important possible side effects of PAXLOVID? Possible side effects of PAXLOVID are:  - Liver Problems  Tell your healthcare provider right away if you have any of these signs and symptoms of liver problems: loss of appetite, yellowing of your skin and the whites of eyes (jaundice), dark-colored urine, pale colored stools and itchy skin, stomach area (abdominal) pain  - Resistance to HIV Medicines   If you have untreated HIV infection, PAXLOVID may lead to some HIV medicines not working as well in the future  - Other possible side effects include: altered sense of taste, diarrhea, high blood pressure, or muscle aches    These are not all the possible side effects of PAXLOVID  Not many people have taken PAXLOVID  Serious and unexpected side effects may happen  [facility] is still being studied, so it is possible that all of the risks are not known at this time  What other treatment choices are there? Like [place] may allow for the emergency use of other medicines to treat people with COVID-19  Go to https://Nagi/ for information on the emergency use of other medicines that are authorized by FDA to treat people with COVID-19  Your healthcare provider may talk with you about clinical trials for which you may be eligible  It is your choice to be treated or not to be treated with PAXLOVID  Should you decide not to receive it or for your child not to receive it, it will not change your standard medical care  What if I am pregnant or breastfeeding? There is no experience treating pregnant women or breastfeeding mothers with PAXLOVID  For a mother and unborn baby, the benefit of taking PAXLOVID may be greater than the risk from the treatment  If you are pregnant, discuss your options and specific situation with your healthcare provider  It is recommended that you use effective barrier contraception or do not have sexual activity while taking PAXLOVID  If you are breastfeeding, discuss your options and specific situation with your healthcare provider  How do I report side effects with PAXLOVID? Contact your healthcare provider if you have any side effects that bother you or do not go away      Report side effects to FDA MedWatch at www fda gov/medwatch or call 7-431-APO8836 or you can report side effects to [facility]  at the contact information provided below  Website Fax number Telephone number   [place] [phone]     How should I store [address]? Store PAXLOVID tablets at room temperature between 68°F to 77°F (20°C to 25°C)  Full fact sheet for patients, parents, and caregivers can be found at: [employer]    I have spent a total time of 12 minutes on the day of the encounter for this patient including       Encounter provider: JEEVAN Almanza     Provider located at:  [address]  [address] [address]     Recent Visits  No visits were found meeting these conditions  Showing recent visits within past 7 days and meeting all other requirements  Today's Visits  Date Type Provider Dept   03/22/23 Office Visit Farooq Almanza today's visits and meeting all other requirements  Future Appointments  No visits were found meeting these conditions  Showing future appointments within next 150 days and meeting all other requirements     My office door was closed  No one else was in the room  Shay Frost acknowledged consent and understanding of privacy and security of the telemedicine visit  I informed the patient that I have reviewed her record in Epic and presented the opportunity for her to ask any questions regarding the visit today  The patient agreed to participate  Subjective:   Shay Frost is a 47 y o  female who has been screened for COVID-19  Symptom change since last report: unchanged  Patient's symptoms include fever (tmax 103), chills, nasal congestion, cough, shortness of breath (mild), myalgias and headache  Patient denies fatigue, malaise, rhinorrhea, sore throat, anosmia, loss of taste, chest tightness, abdominal pain, nausea, vomiting and diarrhea         - Date of symptom onset: 3/13/2023      COVID-19 vaccination status: Fully vaccinated with booster    Chance Shelby has been staying home and has isolated themselves in her home  She is taking care to not share personal items and is cleaning all surfaces that are touched often, like counters, tabletops, and doorknobs using household cleaning sprays or wipes  She is wearing a mask when she leaves her room  No prior Covid infection  She had the bivalent booster in September    Lab Results   Component Value Date    SARSCOV2 Negative 11/13/2022       Review of Systems   Constitutional: Positive for chills and fever (tmax 103)  Negative for fatigue  HENT: Positive for congestion  Negative for rhinorrhea and sore throat  Respiratory: Positive for cough and shortness of breath (mild)  Negative for chest tightness  Gastrointestinal: Negative for abdominal pain, diarrhea, nausea and vomiting  Musculoskeletal: Positive for myalgias  Neurological: Positive for headaches  Current Outpatient Medications on File Prior to Visit   Medication Sig   • amLODIPine (NORVASC) 5 mg tablet take 1 tablet by mouth once daily   • metoprolol tartrate (LOPRESSOR) 25 mg tablet Take 1 tablet (25 mg total) by mouth every 12 (twelve) hours   • ondansetron (ZOFRAN) 4 mg tablet Take 1 tablet (4 mg total) by mouth every 8 (eight) hours as needed for nausea or vomiting   • pantoprazole (PROTONIX) 40 mg tablet Take 1 tablet (40 mg total) by mouth daily   • lisinopril (ZESTRIL) 10 mg tablet take 1 tablet by mouth once daily (Patient not taking: Reported on 3/22/2023)       Objective:    /82   Pulse 94   Temp 97 8 °F (36 6 °C)   Resp 18   Ht 5' 7" (1 702 m)   Wt 116 kg (255 lb)   SpO2 94%   BMI 39 94 kg/m²      Physical Exam  Vitals and nursing note reviewed  Constitutional:       General: She is not in acute distress  Appearance: Normal appearance  She is well-developed  She is obese  She is ill-appearing  HENT:      Head: Normocephalic     Eyes:      Conjunctiva/sclera: Conjunctivae normal    Cardiovascular:      Rate and Rhythm: Normal rate and regular rhythm  Pulses: Normal pulses  Pulmonary:      Breath sounds: Normal breath sounds  No wheezing or rales  Lymphadenopathy:      Cervical: No cervical adenopathy  Skin:     General: Skin is warm and dry  Neurological:      Mental Status: She is alert and oriented to person, place, and time     Psychiatric:         Mood and Affect: Mood normal          Behavior: Behavior normal        JEEVAN Roman

## 2023-03-24 ENCOUNTER — TELEPHONE (OUTPATIENT)
Dept: FAMILY MEDICINE CLINIC | Facility: CLINIC | Age: 53
End: 2023-03-24

## 2023-03-24 NOTE — TELEPHONE ENCOUNTER
Saw Nhi Streeter 03/22 for dana Hansen Aase started taking paxlovid at 9 last night  Had bad stomach pains and vomited  Then took second dose this morning and same thing happened  Patient would like to know if she should stop medication?

## 2023-03-24 NOTE — TELEPHONE ENCOUNTER
Per jessy patient is to discontinue med and used OTC meds  Call back if not better in 1 week    Sha Webb

## 2023-04-25 ENCOUNTER — OFFICE VISIT (OUTPATIENT)
Dept: FAMILY MEDICINE CLINIC | Facility: CLINIC | Age: 53
End: 2023-04-25

## 2023-04-25 VITALS
TEMPERATURE: 97.5 F | DIASTOLIC BLOOD PRESSURE: 80 MMHG | WEIGHT: 240 LBS | SYSTOLIC BLOOD PRESSURE: 110 MMHG | BODY MASS INDEX: 37.67 KG/M2 | OXYGEN SATURATION: 95 % | HEIGHT: 67 IN | HEART RATE: 56 BPM | RESPIRATION RATE: 16 BRPM

## 2023-04-25 DIAGNOSIS — J30.1 SEASONAL ALLERGIC RHINITIS DUE TO POLLEN: ICD-10-CM

## 2023-04-25 DIAGNOSIS — Z98.84 S/P BARIATRIC SURGERY: ICD-10-CM

## 2023-04-25 DIAGNOSIS — K91.2 POSTSURGICAL MALABSORPTION: ICD-10-CM

## 2023-04-25 DIAGNOSIS — E11.9 NEW ONSET TYPE 2 DIABETES MELLITUS (HCC): Primary | ICD-10-CM

## 2023-04-25 DIAGNOSIS — I10 ESSENTIAL HYPERTENSION: ICD-10-CM

## 2023-04-25 DIAGNOSIS — Z12.31 SCREENING MAMMOGRAM FOR HIGH-RISK PATIENT: ICD-10-CM

## 2023-04-25 DIAGNOSIS — Z12.4 SCREENING FOR CERVICAL CANCER: ICD-10-CM

## 2023-04-25 DIAGNOSIS — E78.2 MIXED HYPERLIPIDEMIA: ICD-10-CM

## 2023-04-25 DIAGNOSIS — Z12.11 SCREEN FOR COLON CANCER: ICD-10-CM

## 2023-04-25 RX ORDER — FLUTICASONE PROPIONATE 50 MCG
1 SPRAY, SUSPENSION (ML) NASAL DAILY
COMMUNITY

## 2023-04-25 RX ORDER — ROSUVASTATIN CALCIUM 10 MG/1
10 TABLET, COATED ORAL DAILY
Qty: 90 TABLET | Refills: 3 | Status: SHIPPED | OUTPATIENT
Start: 2023-04-25

## 2023-04-25 RX ORDER — LEVOCETIRIZINE DIHYDROCHLORIDE 5 MG/1
5 TABLET, FILM COATED ORAL EVERY EVENING
COMMUNITY
End: 2023-04-25 | Stop reason: SDUPTHER

## 2023-04-25 RX ORDER — LEVOCETIRIZINE DIHYDROCHLORIDE 5 MG/1
5 TABLET, FILM COATED ORAL EVERY EVENING
Qty: 90 TABLET | Refills: 1 | Status: SHIPPED | OUTPATIENT
Start: 2023-04-25

## 2023-04-25 NOTE — ASSESSMENT & PLAN NOTE
Doing well on diet control, weight loss  Lab Results   Component Value Date    HGBA1C 5 2 04/21/2023

## 2023-04-25 NOTE — PROGRESS NOTES
Assessment/Plan:    1  New onset type 2 diabetes mellitus ([facility] [address] )  Assessment & Plan:  Doing well on diet control, weight loss  Lab Results   Component Value Date    HGBA1C 5 2 04/21/2023       Orders:  -     IRIS Diabetic eye exam  -     CBC; Future; Expected date: 07/09/2023  -     Comprehensive metabolic panel; Future; Expected date: 07/09/2023  -     Hemoglobin A1C; Future; Expected date: 07/09/2023  -     CBC  -     Comprehensive metabolic panel  -     Hemoglobin A1C    2  Essential hypertension  Assessment & Plan:  stable    Orders:  -     metoprolol tartrate (LOPRESSOR) 25 mg tablet; Take 0 5 tablets (12 5 mg total) by mouth every 12 (twelve) hours  -     CBC; Future; Expected date: 07/09/2023  -     Comprehensive metabolic panel; Future; Expected date: 07/09/2023  -     Hemoglobin A1C; Future; Expected date: 07/09/2023  -     CBC  -     Comprehensive metabolic panel  -     Hemoglobin A1C    3  Mixed hyperlipidemia  -     rosuvastatin (CRESTOR) 10 MG tablet; Take 1 tablet (10 mg total) by mouth daily  -     Comprehensive metabolic panel; Future; Expected date: 07/09/2023  -     Lipid Panel with Direct LDL reflex; Future; Expected date: 07/09/2023  -     Comprehensive metabolic panel  -     Lipid Panel with Direct LDL reflex  -     CBC; Future; Expected date: 07/09/2023  -     Comprehensive metabolic panel; Future; Expected date: 07/09/2023  -     Hemoglobin A1C; Future; Expected date: 07/09/2023  -     CBC  -     Comprehensive metabolic panel  -     Hemoglobin A1C    4  S/P bariatric surgery  -     CBC; Future; Expected date: 07/09/2023  -     Comprehensive metabolic panel; Future; Expected date: 07/09/2023  -     Hemoglobin A1C; Future; Expected date: 07/09/2023  -     CBC  -     Comprehensive metabolic panel  -     Hemoglobin A1C    5  Seasonal allergic rhinitis due to pollen  -     levocetirizine (XYZAL) 5 MG tablet; Take 1 tablet (5 mg total) by mouth every evening    6   Screening mammogram for high-risk patient  -     Mammo screening bilateral w 3d & cad; Future; Expected date: 04/25/2023    7  Screening for cervical cancer  -     Ambulatory Referral to Obstetrics / Gynecology; Future    8  Screen for colon cancer  -     Ambulatory referral for colonoscopy; Future    9  Postsurgical malabsorption            There are no Patient Instructions on file for this visit  Return in about 3 months (around 7/25/2023) for Recheck  Subjective:      Patient ID: Rose Marie Pedraza is a 47 y o  female  Chief Complaint   Patient presents with   • Follow-up     3 month [id number]       Pt is here here for a 4 month follow up  Pt states she put an anjelica on her phone to check her HR  States its ibeen in the 40/50's      The following portions of the patient's history were reviewed and updated as appropriate: allergies, current medications, past family history, past medical history, past social history, past surgical history and problem list     Review of Systems   Constitutional: Negative  Negative for activity change, appetite change, chills, diaphoresis and fatigue  HENT: Negative  Negative for dental problem, ear pain, sinus pressure and sore throat  Eyes: Negative  Negative for photophobia, pain, discharge, redness, itching and visual disturbance  Respiratory: Negative for apnea and chest tightness  Cardiovascular: Negative  Negative for chest pain, palpitations and leg swelling  Gastrointestinal: Negative  Negative for abdominal distention, abdominal pain, constipation and diarrhea  Endocrine: Negative  Negative for cold intolerance and heat intolerance  Genitourinary: Negative  Negative for difficulty urinating and dyspareunia  Musculoskeletal: Negative  Negative for arthralgias and back pain  Skin: Negative  Allergic/Immunologic: Negative for environmental allergies  Neurological: Negative  Negative for dizziness  Psychiatric/Behavioral: Negative  Negative for agitation           Current "Outpatient Medications   Medication Sig Dispense Refill   • amLODIPine (NORVASC) 5 mg tablet take 1 tablet by mouth once daily 90 tablet 1   • fluticasone (FLONASE) 50 mcg/act nasal spray 1 spray into each nostril daily     • levocetirizine (XYZAL) 5 MG tablet Take 1 tablet (5 mg total) by mouth every evening 90 tablet 1   • metoprolol tartrate (LOPRESSOR) 25 mg tablet Take 0 5 tablets (12 5 mg total) by mouth every 12 (twelve) hours 90 tablet 1   • pantoprazole (PROTONIX) 40 mg tablet Take 1 tablet (40 mg total) by mouth daily 90 tablet 1   • rosuvastatin (CRESTOR) 10 MG tablet Take 1 tablet (10 mg total) by mouth daily 90 tablet 3     No current facility-administered medications for this visit  Objective:    /80 (BP Location: Left arm, Patient Position: Sitting, Cuff Size: Large)   Pulse 56   Temp 97 5 °F (36 4 °C) (Temporal)   Resp 16   Ht 5' 7\" (1 702 m)   Wt 109 kg (240 lb)   SpO2 95%   BMI 37 59 kg/m²        Physical Exam  Vitals and nursing note reviewed  Constitutional:       General: She is not in acute distress  Appearance: She is well-developed  She is not diaphoretic  HENT:      Head: Normocephalic and atraumatic  Right Ear: External ear normal       Left Ear: External ear normal       Nose: Nose normal       Mouth/Throat:      Pharynx: No oropharyngeal exudate  Eyes:      General: No scleral icterus  Right eye: No discharge  Left eye: No discharge  Pupils: Pupils are equal, round, and reactive to light  Neck:      Thyroid: No thyromegaly  Cardiovascular:      Rate and Rhythm: Normal rate  Pulses: no weak pulses          Dorsalis pedis pulses are 2+ on the right side and 2+ on the left side  Posterior tibial pulses are 2+ on the right side and 2+ on the left side  Heart sounds: Normal heart sounds  No murmur heard  Pulmonary:      Effort: Pulmonary effort is normal  No respiratory distress        Breath sounds: Normal breath " sounds  No wheezing  Abdominal:      General: Bowel sounds are normal  There is no distension  Palpations: Abdomen is soft  There is no mass  Tenderness: There is no abdominal tenderness  There is no guarding or rebound  Musculoskeletal:         General: Normal range of motion  Feet:      Right foot:      Skin integrity: No ulcer, skin breakdown, erythema, warmth, callus or dry skin  Left foot:      Skin integrity: No ulcer, skin breakdown, erythema, warmth, callus or dry skin  Skin:     General: Skin is warm and dry  Findings: No erythema or rash  Neurological:      Mental Status: She is alert  Coordination: Coordination normal       Deep Tendon Reflexes: Reflexes normal    Psychiatric:         Behavior: Behavior normal             Diabetic Foot Exam    Patient's shoes and socks removed  Right Foot/Ankle   Right Foot Inspection  Skin Exam: skin normal  Skin not intact, no dry skin, no warmth, no callus, no erythema, no maceration, no abnormal color, no pre-ulcer, no ulcer and no callus  Toe Exam: ROM and strength within normal limits  Sensory   Vibration: intact  Proprioception: intact  Monofilament testing: intact    Vascular  Capillary refills: < 3 seconds  The right DP pulse is 2+  The right PT pulse is 2+  Left Foot/Ankle  Left Foot Inspection  Skin Exam: skin normal  Skin not intact, no dry skin, no warmth, no erythema, no maceration, normal color, no pre-ulcer, no ulcer and no callus  Toe Exam: ROM and strength within normal limits  Sensory   Vibration: intact  Proprioception: intact  Monofilament testing: intact    Vascular  Capillary refills: < 3 seconds  The left DP pulse is 2+  The left PT pulse is 2+       Assign Risk Category  No deformity present  No loss of protective sensation  No weak pulses  Risk: 0    Recent Results (from the past 672 hour(s))   Comprehensive metabolic panel    Collection Time: 04/21/23  9:11 AM   Result Value Ref Range Glucose, Random 96 70 - 99 mg/dL    BUN 11 6 - 24 mg/dL    Creatinine 0 53 (L) 0 57 - 1 00 mg/dL    eGFR 111 >59 mL/min/1 73    SL AMB BUN/CREATININE RATIO 21 9 - 23    Sodium 140 134 - 144 mmol/L    Potassium 3 8 3 5 - 5 2 mmol/L    Chloride 100 96 - 106 mmol/L    CO2 23 20 - 29 mmol/L    CALCIUM 9 9 8 7 - 10 2 mg/dL    Protein, Total 7 2 6 0 - 8 5 g/dL    Albumin 4 6 3 8 - 4 9 g/dL    Globulin, Total 2 6 1 5 - 4 5 g/dL    Albumin/Globulin Ratio 1 8 1 2 - 2 2    TOTAL BILIRUBIN 0 4 0 0 - 1 2 mg/dL    Alk Phos Isoenzymes 56 44 - 121 IU/L    AST 30 0 - 40 IU/L    ALT 33 (H) 0 - 32 IU/L   Lipid Panel with Direct LDL reflex    Collection Time: 04/21/23  9:11 AM   Result Value Ref Range    Cholesterol, Total 232 (H) 100 - 199 mg/dL    Triglycerides 269 (H) 0 - 149 mg/dL    HDL 36 (L) >39 mg/dL    VLDL Cholesterol Calculated 49 (H) 5 - 40 mg/dL    LDL Calculated 147 (H) 0 - 99 mg/dL    LDl/HDL Ratio 4 1 (H) 0 0 - 3 2 ratio   Hemoglobin A1C    Collection Time: 04/21/23  9:11 AM   Result Value Ref Range    Hemoglobin A1C 5 2 4 8 - 5 6 %    Estimated Average Glucose 103 mg/dL   Microalbumin / creatinine urine ratio    Collection Time: 04/21/23  9:11 AM   Result Value Ref Range    Creatinine, Urine 183 4 Not Estab  mg/dL    Microalbum  ,U,Random 14 5 Not Estab  ug/mL    Microalb/Creat Ratio 8 0 - 29 mg/g creat   Cardiovascular Report    Collection Time: 04/21/23  9:11 AM   Result Value Ref Range    Interpretation Note          Zohra Medrano DO

## 2023-04-28 LAB
25(OH)D3+25(OH)D2 SERPL-MCNC: 43.9 NG/ML (ref 30–100)
ALBUMIN SERPL-MCNC: 4.5 G/DL (ref 3.8–4.9)
ALBUMIN/GLOB SERPL: 1.7 {RATIO} (ref 1.2–2.2)
ALP SERPL-CCNC: 61 IU/L (ref 44–121)
ALT SERPL-CCNC: 36 IU/L (ref 0–32)
AST SERPL-CCNC: 29 IU/L (ref 0–40)
BASOPHILS # BLD AUTO: 0 X10E3/UL (ref 0–0.2)
BASOPHILS NFR BLD AUTO: 1 %
BILIRUB SERPL-MCNC: 0.5 MG/DL (ref 0–1.2)
BUN SERPL-MCNC: 12 MG/DL (ref 6–24)
BUN/CREAT SERPL: 18 (ref 9–23)
CALCIUM SERPL-MCNC: 9.9 MG/DL (ref 8.7–10.2)
CHLORIDE SERPL-SCNC: 100 MMOL/L (ref 96–106)
CHOLEST SERPL-MCNC: 224 MG/DL (ref 100–199)
CHOLEST/HDLC SERPL: 6.1 RATIO (ref 0–4.4)
CO2 SERPL-SCNC: 22 MMOL/L (ref 20–29)
CREAT SERPL-MCNC: 0.65 MG/DL (ref 0.57–1)
EGFR: 105 ML/MIN/1.73
EOSINOPHIL # BLD AUTO: 0.1 X10E3/UL (ref 0–0.4)
EOSINOPHIL NFR BLD AUTO: 2 %
ERYTHROCYTE [DISTWIDTH] IN BLOOD BY AUTOMATED COUNT: 14.3 % (ref 11.7–15.4)
EST. AVERAGE GLUCOSE BLD GHB EST-MCNC: 103 MG/DL
FOLATE SERPL-MCNC: >20 NG/ML
GLOBULIN SER-MCNC: 2.6 G/DL (ref 1.5–4.5)
GLUCOSE SERPL-MCNC: 94 MG/DL (ref 70–99)
HBA1C MFR BLD: 5.2 % (ref 4.8–5.6)
HCT VFR BLD AUTO: 47.6 % (ref 34–46.6)
HDLC SERPL-MCNC: 37 MG/DL
HGB BLD-MCNC: 15.7 G/DL (ref 11.1–15.9)
IMM GRANULOCYTES # BLD: 0 X10E3/UL (ref 0–0.1)
IMM GRANULOCYTES NFR BLD: 0 %
IRON SATN MFR SERPL: 24 % (ref 15–55)
IRON SERPL-MCNC: 66 UG/DL (ref 27–159)
LDLC SERPL CALC-MCNC: 139 MG/DL (ref 0–99)
LDLC SERPL DIRECT ASSAY-MCNC: 144 MG/DL (ref 0–99)
LYMPHOCYTES # BLD AUTO: 2.2 X10E3/UL (ref 0.7–3.1)
LYMPHOCYTES NFR BLD AUTO: 40 %
MCH RBC QN AUTO: 28.6 PG (ref 26.6–33)
MCHC RBC AUTO-ENTMCNC: 33 G/DL (ref 31.5–35.7)
MCV RBC AUTO: 87 FL (ref 79–97)
MONOCYTES # BLD AUTO: 0.4 X10E3/UL (ref 0.1–0.9)
MONOCYTES NFR BLD AUTO: 7 %
NEUTROPHILS # BLD AUTO: 2.8 X10E3/UL (ref 1.4–7)
NEUTROPHILS NFR BLD AUTO: 50 %
PLATELET # BLD AUTO: 186 X10E3/UL (ref 150–450)
POTASSIUM SERPL-SCNC: 3.8 MMOL/L (ref 3.5–5.2)
PROT SERPL-MCNC: 7.1 G/DL (ref 6–8.5)
PTH-INTACT SERPL-MCNC: 17 PG/ML (ref 15–65)
RBC # BLD AUTO: 5.49 X10E6/UL (ref 3.77–5.28)
SL AMB VLDL CHOLESTEROL CALC: 48 MG/DL (ref 5–40)
SODIUM SERPL-SCNC: 140 MMOL/L (ref 134–144)
TIBC SERPL-MCNC: 277 UG/DL (ref 250–450)
TRIGL SERPL-MCNC: 267 MG/DL (ref 0–149)
UIBC SERPL-MCNC: 211 UG/DL (ref 131–425)
VIT A SERPL-MCNC: 36.8 UG/DL (ref 20.1–62)
VIT B1 BLD-SCNC: 182 NMOL/L (ref 66.5–200)
VIT B12 SERPL-MCNC: 905 PG/ML (ref 232–1245)
WBC # BLD AUTO: 5.5 X10E3/UL (ref 3.4–10.8)
ZINC SERPL-MCNC: 88 UG/DL (ref 44–115)

## 2023-05-18 DIAGNOSIS — F43.21 GRIEF: Primary | ICD-10-CM

## 2023-05-18 RX ORDER — ALPRAZOLAM 0.25 MG/1
0.25 TABLET ORAL DAILY PRN
Qty: 30 TABLET | Refills: 0 | Status: SHIPPED | OUTPATIENT
Start: 2023-05-18

## 2023-06-07 ENCOUNTER — OFFICE VISIT (OUTPATIENT)
Dept: BARIATRICS | Facility: CLINIC | Age: 53
End: 2023-06-07
Payer: COMMERCIAL

## 2023-06-07 VITALS
SYSTOLIC BLOOD PRESSURE: 122 MMHG | HEART RATE: 70 BPM | HEIGHT: 67 IN | BODY MASS INDEX: 34.84 KG/M2 | WEIGHT: 222 LBS | DIASTOLIC BLOOD PRESSURE: 84 MMHG

## 2023-06-07 DIAGNOSIS — Z98.84 STATUS POST GASTRIC BYPASS FOR OBESITY: Primary | ICD-10-CM

## 2023-06-07 DIAGNOSIS — K91.2 POSTSURGICAL MALABSORPTION: ICD-10-CM

## 2023-06-07 PROCEDURE — 99213 OFFICE O/P EST LOW 20 MIN: CPT | Performed by: SURGERY

## 2023-06-07 RX ORDER — CALCIUM CARBONATE/VITAMIN D3 500 MG-10
500 TABLET,CHEWABLE ORAL 3 TIMES DAILY
COMMUNITY

## 2023-06-07 RX ORDER — PERPHENAZINE 4 MG
TABLET ORAL
COMMUNITY

## 2023-06-07 NOTE — PROGRESS NOTES
Assessment/Plan:    - s/p LRYGB 1/17/2023   - obtain postop labs  - increase physical activity and maintain healthy diet as instructed  - f/u in 2 months with surgical PA for your 6 month visit     Diagnoses and all orders for this visit:    Status post gastric bypass for obesity  -     CBC and Platelet; Future  -     Comprehensive metabolic panel; Future  -     Ferritin; Future  -     Iron Saturation %; Future  -     Lipid panel; Future  -     PTH, intact; Future  -     Vitamin A; Future  -     Vitamin B1, whole blood; Future  -     Vitamin B12; Future  -     Vitamin D 25 hydroxy; Future  -     Zinc; Future  -     CBC and Platelet  -     Comprehensive metabolic panel  -     Ferritin  -     Lipid panel  -     PTH, intact  -     Vitamin A  -     Vitamin B1, whole blood  -     Vitamin B12  -     Vitamin D 25 hydroxy  -     Zinc    Postsurgical malabsorption  -     CBC and Platelet; Future  -     Comprehensive metabolic panel; Future  -     Ferritin; Future  -     Iron Saturation %; Future  -     Lipid panel; Future  -     PTH, intact; Future  -     Vitamin A; Future  -     Vitamin B1, whole blood; Future  -     Vitamin B12; Future  -     Vitamin D 25 hydroxy; Future  -     Zinc; Future  -     CBC and Platelet  -     Comprehensive metabolic panel  -     Ferritin  -     Lipid panel  -     PTH, intact  -     Vitamin A  -     Vitamin B1, whole blood  -     Vitamin B12  -     Vitamin D 25 hydroxy  -     Zinc    Other orders  -     Multiple Vitamins-Minerals (BARIATRIC MULTIVITAMINS/IRON PO); Take by mouth  -     Calcium Carb-Cholecalciferol (Calcium 500/D) 500-10 MG-MCG CHEW; Chew 500 mg 3 (three) times a day  -     Collagen-Vitamin C-Biotin (Collagen 1500/C) 500-50-0 8 MG CAPS; Take by mouth          Subjective:      Patient ID: Uriah Whittaker is a 47 y o  female  -s/p Shaila-En-Y Gastric Bypass with Dr Abraham Bah on 1/17/2023  Presents to the office today for routine follow up   Tolerating diet without issues; denies N/V, "dysphagia, reflux  Overall doing Well  Initial: 309 8  Current: 222  EWL: 58%  Jean   Current BMI is Body mass index is 34 77 kg/m²  The following portions of the patient's history were reviewed and updated as appropriate: allergies, current medications, past family history, past medical history, past social history, past surgical history and problem list     Review of Systems   Constitutional: Negative  Respiratory: Negative  Cardiovascular: Negative  Gastrointestinal: Negative  Musculoskeletal: Negative  Neurological: Negative  All other systems reviewed and are negative  Objective:      /84 (BP Location: Right arm, Patient Position: Sitting, Cuff Size: Large)   Pulse 70   Ht 5' 7\" (1 702 m)   Wt 101 kg (222 lb)   BMI 34 77 kg/m²          Physical Exam  Vitals reviewed  Constitutional:       Appearance: She is well-developed  HENT:      Head: Normocephalic  Eyes:      Extraocular Movements: Extraocular movements intact  Cardiovascular:      Rate and Rhythm: Normal rate  Pulmonary:      Effort: Pulmonary effort is normal    Abdominal:      General: There is no distension  Musculoskeletal:         General: Normal range of motion  Cervical back: Normal range of motion  Neurological:      Mental Status: She is alert and oriented to person, place, and time  Psychiatric:         Mood and Affect: Mood normal          Behavior: Behavior normal          Thought Content: Thought content normal          Judgment: Judgment normal            BARRIERS: none identified    GOALS:   · Continued/Maintain healthy weight loss with good nutrition intakes  · Adequate hydration with at least 64oz  fluid intake  · Normal vitamin and mineral levels  · Exercise as tolerated  · Follow-up in 2 months/year   We kindly ask that your arrive 15 minutes before your scheduled appointment time with your provider to allow our staff to room you, get your vital signs and update your " chart     · Follow diet as discussed  · Get lab work done in the next 2 weeks  You have been given a lab slip today  Please call the office if you need a replacement  It is recommended to check with your insurance BEFORE getting labs done to make sure they are covered by your policy  Also, please check with your PCP and other providers before getting labs to avoid duplicate labs  Make sure to HOLD any multivitamins that may contain biotin and any biotin supplements FOR 5 DAYS before any labs since it can affect the results  · Follow vitamin and mineral recommendations as reviewed with you  · Call our office if you have any problems with abdominal pain especially associated with fever, chills, nausea, vomiting or any other concerns  · All  Post-bariatric surgery patients should be aware that very small quantities of any alcohol can cause impairment and it is very possible not to feel the effect  The effect can be in the system for several hours  It is also a stomach irritant  · It is advised to AVOID alcohol, Nonsteroidal antiinflammatory drugs (NSAIDS) and nicotine of all forms   Any of these can cause stomach irritation/pain

## 2023-06-23 ENCOUNTER — HOSPITAL ENCOUNTER (OUTPATIENT)
Dept: MRI IMAGING | Facility: HOSPITAL | Age: 53
End: 2023-06-23
Payer: COMMERCIAL

## 2023-06-23 ENCOUNTER — TELEPHONE (OUTPATIENT)
Dept: OBGYN CLINIC | Facility: HOSPITAL | Age: 53
End: 2023-06-23

## 2023-06-23 ENCOUNTER — APPOINTMENT (OUTPATIENT)
Dept: RADIOLOGY | Facility: CLINIC | Age: 53
End: 2023-06-23
Payer: COMMERCIAL

## 2023-06-23 ENCOUNTER — OFFICE VISIT (OUTPATIENT)
Dept: OBGYN CLINIC | Facility: CLINIC | Age: 53
End: 2023-06-23
Payer: COMMERCIAL

## 2023-06-23 VITALS — WEIGHT: 222 LBS | HEIGHT: 67 IN | BODY MASS INDEX: 34.84 KG/M2

## 2023-06-23 DIAGNOSIS — M25.522 PAIN IN LEFT ELBOW: ICD-10-CM

## 2023-06-23 DIAGNOSIS — M24.129 INTERNAL DERANGEMENT OF ELBOW: ICD-10-CM

## 2023-06-23 DIAGNOSIS — M25.522 PAIN IN LEFT ELBOW: Primary | ICD-10-CM

## 2023-06-23 PROCEDURE — 73080 X-RAY EXAM OF ELBOW: CPT

## 2023-06-23 PROCEDURE — 99214 OFFICE O/P EST MOD 30 MIN: CPT | Performed by: PHYSICIAN ASSISTANT

## 2023-06-23 PROCEDURE — 73221 MRI JOINT UPR EXTREM W/O DYE: CPT

## 2023-06-23 PROCEDURE — G1004 CDSM NDSC: HCPCS

## 2023-06-23 NOTE — TELEPHONE ENCOUNTER
Caller: Patient     Doctor:  Preethi Cunha     Reason for call: Patient would like a call back to discuss MRI results from 6/23    Call back#: [phone]

## 2023-06-23 NOTE — PROGRESS NOTES
Assessment/Plan:  1  Pain in left elbow  XR elbow 3+ vw left      2  Internal derangement of elbow              Francisco Schmitt is presenting with signs and symptoms consistent with an extensor mass tendon tear of the left elbow  She has calcific densities laterally which are readily viewable on x-ray  She is specifically tender in the region as well as swollen  She is unable to form a composite fist actively nor is she able to actively extend the left wrist   She is in significant pain  I would like her to have a stat MRI of the left elbow questioning extensor tendon tear as well as the multiple calcific densities found laterally over the lateral epicondyle, posteriorly over the olecranon and a small density in the anterior aspect distal to the joint  Once the MRI has been completed I would like her to follow up with Dr Vic Brothers  Should a tear be found to the extensor tendon she may require surgery  She verbally stated she understood  Francisco Schmitt has a treatment of a gastric bypass  She cannot take over-the-counter NSAIDs  I recommend she utilize ice and Tylenol to help manage her pain  Subjective:   Konstantin Flannery is a 47 y o  female who presents to the office today for evaluation of her left elbow  Francisco Schmitt states she has been having discomfort in the lateral aspect of the left elbow over the past 5 months or so  She states the pain is a warm and burning sensation that radiates into her forearm  Earlier this week her left elbow pain became quite severe  She states she is unable to sleep at night due to a persistent throbbing in the left elbow  She states she is unable to actively extend the left wrist nor can she fully extend the left elbow  She denies injury to the left elbow  She denies distal paresthesias today  Review of Systems   Constitutional: Negative for chills, fever and unexpected weight change  HENT: Negative for hearing loss, nosebleeds and sore throat      Eyes: Negative for pain, redness and visual disturbance  Respiratory: Negative for cough, shortness of breath and wheezing  Cardiovascular: Negative for chest pain, palpitations and leg swelling  Gastrointestinal: Negative for abdominal pain, nausea and vomiting  Endocrine: Negative for polydipsia and polyuria  Genitourinary: Negative for dysuria and hematuria  Musculoskeletal:        See HPI   Skin: Negative for rash and wound  Neurological: Negative for dizziness, numbness and headaches  Psychiatric/Behavioral: Negative for decreased concentration and suicidal ideas  The patient is not nervous/anxious            Past Medical History:   Diagnosis Date   • Asthma    • Broken tooth     upper right-back   • High cholesterol    • Hypertension    • Morbid obesity with BMI of 45 0-49 9, adult (HCC)    • Pain in right knee     MRI on 22   • Sleep apnea     no c pap needed       Past Surgical History:   Procedure Laterality Date   • ABDOMINAL ADHESION SURGERY N/A 2023    Procedure: LYSIS ADHESIONS;  Surgeon: Kristen Benito MD;  Location: [address] [place] [address];  Service: Bariatrics   •  SECTION     • EGD  2022    food in the stomach-repeat EGD on 22   • LAPAROSCOPY     • MT ARTHRS KNE SURG W/MENISCECTOMY MED/LAT W/SHVG Right 2022    Procedure: KNEE ARTHROSCOPY  MEDIAL Razia Smoke;  Surgeon: Gita Umanzor MD;  Location: [facility];  Service: Orthopedics   • MT LAPS GSTR RSTCV PX W/BYP ALEAH-EN-Y LIMB <150 CM N/A 2023    Procedure: LAPAROSCOPIC ALEAH-EN-Y GASTRIC BYPASS AND INTRAOPERATIVE EGD;  Surgeon: Kristen Benito MD;  Location: [address] [place] [address];  Service: Bariatrics   • TONSILLECTOMY     • WISDOM TOOTH EXTRACTION         Family History   Problem Relation Age of Onset   • Hypertension Mother    • Stroke Mother    • Alzheimer's disease Mother    • Diabetes Father    • Hypertension Father    • Heart disease Father    • Thyroid disease Neg Hx        Social History Occupational History   • Not on file   Tobacco Use   • Smoking status: Never     Passive exposure: Never   • Smokeless tobacco: Never   Vaping Use   • Vaping Use: Never used   Substance and Sexual Activity   • Alcohol use: Yes     Comment: rare   • Drug use: Never   • Sexual activity: Not on file         Current Outpatient Medications:   •  ALPRAZolam (XANAX) 0 25 mg tablet, Take 1 tablet (0 25 mg total) by mouth daily as needed for anxiety (Patient not taking: Reported on 6/7/2023), Disp: 30 tablet, Rfl: 0  •  amLODIPine (NORVASC) 5 mg tablet, take 1 tablet by mouth once daily, Disp: 90 tablet, Rfl: 1  •  Calcium Carb-Cholecalciferol (Calcium 500/D) 500-10 MG-MCG CHEW, Chew 500 mg 3 (three) times a day, Disp: , Rfl:   •  Collagen-Vitamin C-Biotin (Collagen 1500/C) 500-50-0 8 MG CAPS, Take by mouth, Disp: , Rfl:   •  fluticasone (FLONASE) 50 mcg/act nasal spray, 1 spray into each nostril daily, Disp: , Rfl:   •  levocetirizine (XYZAL) 5 MG tablet, Take 1 tablet (5 mg total) by mouth every evening, Disp: 90 tablet, Rfl: 1  •  Multiple Vitamins-Minerals (BARIATRIC MULTIVITAMINS/IRON PO), Take by mouth, Disp: , Rfl:   •  pantoprazole (PROTONIX) 40 mg tablet, Take 1 tablet (40 mg total) by mouth daily, Disp: 90 tablet, Rfl: 1  •  rosuvastatin (CRESTOR) 10 MG tablet, Take 1 tablet (10 mg total) by mouth daily, Disp: 90 tablet, Rfl: 3    No Known Allergies    Objective:  Vitals:       Left Hand Exam     Range of Motion   Wrist   Extension: 45 (Passive)     Muscle Strength   Wrist extension: 0/5   :  2/5     Other   Sensation: normal (Ulnar, median and radial distributions)      Left Elbow Exam     Tenderness   The patient is experiencing tenderness in the lateral epicondyle and olecranon fossa (Distal triceps)  Range of Motion   Left elbow extension: -25, painful     Flexion: 90 (Painful)     Other   Erythema: absent  Sensation: normal  Pulse: present (2+)    Comments:  Swelling present laterally the extends into the left forearm and hand  Posterior elbow swelling near triceps insertion  Patient is unable to actively extend the left wrist  Patient can actively flexion left wrist            Strength/Myotome Testing     Left Wrist/Hand   Wrist extension: 0      Physical Exam  Vitals reviewed  Constitutional:       Appearance: She is well-developed  HENT:      Head: Normocephalic and atraumatic  Eyes:      General:         Right eye: No discharge  Left eye: No discharge  Conjunctiva/sclera: Conjunctivae normal    Cardiovascular:      Rate and Rhythm: Regular rhythm  Pulmonary:      Effort: Pulmonary effort is normal  No respiratory distress  Breath sounds: No stridor  Musculoskeletal:      Cervical back: Normal range of motion and neck supple  Skin:     General: Skin is warm and dry  Neurological:      Mental Status: She is alert and oriented to person, place, and time  Psychiatric:         Behavior: Behavior normal          I have personally reviewed pertinent films in PACS and my interpretation is as follows:  Xrays of the left elbow taken today: Large calcific density over the lateral epicondyle which is either an old avulsion or large calcific tendinitis  There is an additional calcific density seen on lateral view distal to radial tuberosity  A small calcific density is also viewed near the olecranon on lateral views  I see no evidence of joint effusion  No other osseous abnormalities noted  This document was created using speech voice recognition software  Grammatical errors, random word insertions, pronoun errors, and incomplete sentences are an occasional consequence of this system due to software limitations, ambient noise, and hardware issues  Any formal questions or concerns about content, text, or information contained within the body of this dictation should be directly addressed to the provider for clarification

## 2023-06-27 ENCOUNTER — OFFICE VISIT (OUTPATIENT)
Dept: OBGYN CLINIC | Facility: MEDICAL CENTER | Age: 53
End: 2023-06-27
Payer: COMMERCIAL

## 2023-06-27 VITALS
HEART RATE: 112 BPM | WEIGHT: 222 LBS | DIASTOLIC BLOOD PRESSURE: 79 MMHG | BODY MASS INDEX: 34.84 KG/M2 | HEIGHT: 67 IN | SYSTOLIC BLOOD PRESSURE: 123 MMHG

## 2023-06-27 DIAGNOSIS — M77.12 LATERAL EPICONDYLITIS OF LEFT ELBOW: Primary | ICD-10-CM

## 2023-06-27 PROCEDURE — 99214 OFFICE O/P EST MOD 30 MIN: CPT | Performed by: ORTHOPAEDIC SURGERY

## 2023-06-27 NOTE — PROGRESS NOTES
[facility] [place]  Bella Torres [address] [place] [id number]       Damir Pizarro  [id number]  1970    ORTHOPAEDIC SURGERY OUTPATIENT NOTE  2023      HISTORY:  47 y o  female, RHD, presents for initial evaluation of left elbow pain  She notes pain started about 6 months ago and has gottten progressively worse over the last month  She had an MRI done on 23  She notes she can't extend elbow and notices heat and swelling  She uses Tylenol, Voltaren and ice for pain  She thought that may have been somewhat beneficial  She does notice numbness and tingling in all fingers and states wearing the sling makes it worse  Past Medical History:   Diagnosis Date   • Asthma    • Broken tooth     upper right-back   • High cholesterol    • Hypertension    • Morbid obesity with BMI of 45 0-49 9, adult (HCC)    • Pain in right knee     MRI on 22   • Sleep apnea     no c pap needed       Past Surgical History:   Procedure Laterality Date   • ABDOMINAL ADHESION SURGERY N/A 2023    Procedure: LYSIS ADHESIONS;  Surgeon: Rolando Otto MD;  Location: [address] [place] [address];  Service: Bariatrics   •  SECTION     • EGD  2022    food in the stomach-repeat EGD on 22   • LAPAROSCOPY     • ND ARTHRS KNE SURG W/MENISCECTOMY MED/LAT W/SHVG Right 2022    Procedure: KNEE ARTHROSCOPY  [address];  Surgeon: Almas Chan MD;  Location: [address] [place] [address];  Service: Orthopedics   • ND LAPS GSTR RSTCV PX W/BYP ALEAH-EN-Y LIMB <150 CM N/A 2023    Procedure: LAPAROSCOPIC ALEAH-EN-Y GASTRIC BYPASS AND INTRAOPERATIVE EGD;  Surgeon: Rolando Otto MD;  [facility]: [facility];  Service: Bariatrics   • TONSILLECTOMY     • WISDOM TOOTH EXTRACTION         Social History     Socioeconomic History   • Marital status:       Spouse name: Not on file   • Number of children: Not on file   • Years of education: Not on file   • Highest education level: Not on file   Occupational History   • Not on file   Tobacco Use   • Smoking status: Never     Passive exposure: Never   • Smokeless tobacco: Never   Vaping Use   • Vaping Use: Never used   Substance and Sexual Activity   • Alcohol use: Yes     Comment: rare   • Drug use: Never   • Sexual activity: Not on file   Other Topics Concern   • Not on file   Social History Narrative   • Not on file     Social Determinants of Health     Financial Resource Strain: Not on file   Food Insecurity: Not on file   Transportation Needs: Not on file   Physical Activity: Not on file   Stress: Not on file   Social Connections: Not on file   Intimate Partner Violence: Not on file   Housing Stability: Not on file       Family History   Problem Relation Age of Onset   • Hypertension Mother    • Stroke Mother    • Alzheimer's disease Mother    • Diabetes Father    • Hypertension Father    • Heart disease Father    • Thyroid disease Neg Hx         Patient's Medications   New Prescriptions    No medications on file   Previous Medications    ALPRAZOLAM (XANAX) 0 25 MG TABLET    Take 1 tablet (0 25 mg total) by mouth daily as needed for anxiety    AMLODIPINE (NORVASC) 5 MG TABLET    take 1 tablet by mouth once daily    CALCIUM CARB-CHOLECALCIFEROL (CALCIUM 500/D) 500-10 MG-MCG CHEW    Chew 500 mg 3 (three) times a day    COLLAGEN-VITAMIN C-BIOTIN (COLLAGEN 1500/C) 500-50-0 8 MG CAPS    Take by mouth    FLUTICASONE (FLONASE) 50 MCG/ACT NASAL SPRAY    1 spray into each nostril daily    LEVOCETIRIZINE (XYZAL) 5 MG TABLET    Take 1 tablet (5 mg total) by mouth every evening    MULTIPLE VITAMINS-MINERALS (BARIATRIC MULTIVITAMINS/IRON PO)    Take by mouth    PANTOPRAZOLE (PROTONIX) 40 MG TABLET    Take 1 tablet (40 mg total) by mouth daily    ROSUVASTATIN (CRESTOR) 10 MG TABLET    Take 1 tablet (10 mg total) by mouth daily   Modified Medications    No medications on file   Discontinued Medications    No medications on file "      No Known Allergies     /79   Pulse (!) 112   Ht 5' 7\" (1 702 m)   Wt 101 kg (222 lb)   BMI 34 77 kg/m²      REVIEW OF SYSTEMS:  Constitutional: Negative  HEENT: Negative  Respiratory: Negative  Skin: Negative  Neurological: Negative  Psychiatric/Behavioral: Negative  Musculoskeletal: Negative except for that mentioned in the HPI  PHYSICAL EXAM:      L elbow:  Flexion: 110 degrees  Extension: 20 degrees  Pronation: 80 degrees  Supination: 80 degrees    TTP Lateral Epicondyle: positive  TTP Medial Epicondyle: negative  TTP Olecranon: negative  TTP Radial Head: negative  TTP Biceps Tendon: negative  TTP at [facility]: positive    Strength:  Flexion: 5/5  Extension: 5/5  Pronation: 5/5  Supination: 5/5    Pain with resisted wrist extension: negative  Pain with resisted 3rd finger extension: negative  Pain with resisted wrist flexion: negative    Varus laxity: negative  Valgus laxity: negative  Milking maneuver: negative  Moving valgus stress test: negative    Cubital tunnel Tinel's: negative    Radial/median/ulnar nerve intact    <2 sec cap refill    Neck:   Spurling's Maneuver: negative  FROM flexion, extension, rotation, sidebending    Reflexes:   Triceps: symmetric bilaterally  Biceps: symmetric bilaterally  Brachioradialis: symmetric bilaterally    Integumentary: -  Bruising: -  Abrasion: -   Rash -  Laceration: -    IMAGING:  MRI left elbow 6/23/23: IMPRESSION:     Calcific tendinosis within the fibers of the common extensor tendon with tendinosis and surrounding subcutaneous edema  Partial tear of the extensor digitorum muscle      Partial tear of the muscular attachment of the distal triceps onto the olecranon      Small anconeus epitrochlearis muscle noted within the cubital tunnel  ASSESSMENT AND PLAN:  47 y o  female with left lateral epicondylitis with partial triceps tear  Etiology of above diagnosis was discussed at length as well as treatment options   Imaging was reviewed " independently and physical exam was performed  Based on the findings, I recommend she start with physical therapy to work on ROM as her elbow is very stiff  Patient was fitted for and given left elbow strap  Continue tylenol and ice as needed  Patient will return to office in 6-8 weeks for follow up

## 2023-06-29 ENCOUNTER — EVALUATION (OUTPATIENT)
Dept: OCCUPATIONAL THERAPY | Facility: CLINIC | Age: 53
End: 2023-06-29
Payer: COMMERCIAL

## 2023-06-29 DIAGNOSIS — M77.12 LATERAL EPICONDYLITIS OF LEFT ELBOW: Primary | ICD-10-CM

## 2023-06-29 PROCEDURE — 97166 OT EVAL MOD COMPLEX 45 MIN: CPT

## 2023-06-29 NOTE — PROGRESS NOTES
"OT Evaluation     Today's date: 2023  Patient name: Zhane Hauser  : 1970  MRN: [id number]  Referring provider: Carolyn German DO  Dx:   Encounter Diagnosis     ICD-10-CM    1  Lateral epicondylitis of left elbow  M77 12 Ambulatory Referral to PT/OT Hand Therapy          Start Time:   Stop Time: 1630  Total time in clinic (min): 45 minutes    Assessment  Assessment details: Patient is a 47 y o  RHD female who presents for OT IE and treatment for Left elbow Lateral epicondylitis  Patient reported pain had initially started approximately 6 months ago, however symptoms had significantly increased within the last 2 months, where last Monday she reports \"I couldn't use it at all\"  Deficits in overall UE strength and ROM and paresthesia to the ring and small digits consistent with cubital tunnel syndrome  Patient lives alone and has 2 cats  Able to drive  Currently unemployed  Patient notes she has moderate deficits with performing daily ADLs/IADLs using the LUE and requires using the RUE for majority of daily tasks  Patient notes no trauma or injury occurring to the LUE  Patient was provided with an elbow strap by the MD and has been wearing the brace consistently for support  Patient uses Tylenol, Voltaren and ice for pain management  MRI was done on 2023  MRI impressions per MD note: Calcific tendinosis within the fibers of the common extensor tendon with tendinosis and surrounding subcutaneous edema  Partial tear of the extensor digitorum muscle  Partial tear of the muscular attachment of the distal triceps onto the olecranon  Small anconeus epitrochlearis muscle noted within the cubital tunnel  Patient referred by Dr Yong Kerr to initiate treatment including hand therapy for ROM, AROM/PROM, massage, edema management, joint mobilization, strengthening and modalities as seen fit     Impairments: abnormal or restricted ROM, activity intolerance, impaired physical strength, lacks appropriate home " exercise program, pain with function and weight-bearing intolerance  Understanding of Dx/Px/POC: good   Prognosis: good    Goals  Short Term Goals by 2 - 4 weeks:    Establish HEP to increase performance with daily activities  Patient will increase ROM of LUE wrist by at least 25 degrees to complete ADLs  Patient will increase ROM of LUE forearm/elbow by at least 15 degrees to assist in household management tasks  Patient will decrease pain rate of LUE by at least 2 points per the VAS scale  Patient will demonstrate ability to achieve a full tight digital composite fist to assist in holding a utensils to feed self with minimal complaints of pain  Decreased Edema wrist and/or MCPs by at least 2 cm to assist in completing ADLs  Long Term Goals by discharge:    Establish final home exercise program to enhance maximal functional level with ADLs  Achieve functional active range of motion of LUE for full return to household chores  Achieve functional strength of LUE for full return to high level ADLs  Plan  Patient would benefit from: OT eval, orthotics, skilled occupational therapy and custom splinting  Planned modality interventions: cryotherapy, thermotherapy: hydrocollator packs and unattended electrical stimulation  Planned therapy interventions: orthotic management and training, orthotic fitting/training, massage, manual therapy, joint mobilization, functional ROM exercises, therapeutic exercise, therapeutic activities, patient education, stretching, strengthening, graded activity, graded exercise and home exercise program  Frequency: 1x-2x/week  Duration in weeks: 8  Plan of Care beginning date: 6/29/2023  Plan of Care expiration date: 8/24/2023  Treatment plan discussed with: patient        Subjective Evaluation    History of Present Illness  Mechanism of injury: Patient is a 47 y o   RHD female who presents for OT IE and treatment for Left "elbow Lateral epicondylitis  Patient reported pain had initially started approximately 6 months ago, however symptoms had significantly increased within the last 2 months, where last Monday she reports \"I couldn't use it at all\"  Deficits in overall UE strength and ROM and paresthesia to the ring and small digits consistent with cubital tunnel syndrome  Patient lives alone and has 2 cats  Able to drive  Currently unemployed  Patient notes she has moderate deficits with performing daily ADLs/IADLs using the LUE and requires using the RUE for majority of daily tasks  Patient notes no trauma or injury occurring to the LUE  Patient was provided with an elbow strap by the MD and has been wearing the brace consistently for support  Patient uses Tylenol, Voltaren and ice for pain management  MRI was done on 2023  MRI impressions per MD note: Calcific tendinosis within the fibers of the common extensor tendon with tendinosis and surrounding subcutaneous edema  Partial tear of the extensor digitorum muscle  Partial tear of the muscular attachment of the distal triceps onto the olecranon  Small anconeus epitrochlearis muscle noted within the cubital tunnel  Patient referred by Dr Bel Callejas to initiate treatment including hand therapy for ROM, AROM/PROM, massage, edema management, joint mobilization, strengthening and modalities as seen fit             Quality of life: good    Pain  Current pain ratin  At best pain ratin  At worst pain ratin  Quality: throbbing and radiating  Relieving factors: medications, ice and heat    Social Support  Lives with: alone    Employment status: not working  Hand dominance: right          Objective     Active Range of Motion     Left Elbow   Flexion: 78 degrees   Extension: -30 degrees   Forearm supination: 25 degrees   Forearm pronation: 30 degrees     Right Elbow   Flexion: 140 degrees   Extension: -4 degrees   Forearm supination: 78 degrees   Forearm pronation: 90 degrees " Left Wrist   Wrist flexion: 50 degrees with pain  Wrist extension: 25 degrees with pain  Radial deviation: 15 degrees with pain  Ulnar deviation: 13 degrees with pain      Right Wrist   Wrist flexion: 75 degrees   Wrist extension: 54 degrees   Radial deviation: 20 degrees   Ulnar deviation: 31 degrees     Additional Active Range of Motion Details  Radial and ulnar deviation modified due to ROM and pain to rotate forearm  Measurement wrist with forearm in a neutral position above the table top  Patient able to make a loose composite fist and touch all digits to DCP, LUE  Patient able to oppose thumb to all digits, LUE    Strength/Myotome Testing     Additional Strength Details  Strength was not officially assessed at today's assessment due to limited ROM and increase in pain/high irritability of symptoms  Swelling     Left Wrist/Hand   Circumference MCP: 21 cm  Circumference wrist: 17 5 cm    Right Wrist/Hand   Circumference MCP: 20 cm  Circumference wrist: 17 1 cm             Precautions: Universal  Lateral epicondylitis, partial triceps tear, and cubital tunnel syndrome  Wears an elbow strap  Tubigrip Size F        Visit Tracker: NO Auth Req  BOMN  Copay $50  30 visits not combined  Date 6/29  IE                                              X                                         Manuals HEP 6/29/2023                       Ther Ex     Education on Dx and HEP x x10min   Active full fist x 3x10   Hook fist x 3x10   Towel slides on table top/wall x 3x10   Wrist AROM (flex/ext & RD/UD) x 3x10   Forearm rotation Pro/Sup x 3x10   Elbow ROM (flex/ext) x 3x10   Ulnar Nerve Glides  Next Session   Therapist assisted gentle PROM  Next Session        Ther Activity     Juxaciser  Next Session   Wall Pullies  Next Session   Cone Srack  Next Session   Towel scrunches  Next Session             Modalities     MHP x x5min            Assessment:   Patient tolerated session well   Patient demonstrated moderate deficits of ROM of digits, wrist/hand and forearm/elbow upon today's assessment  Strength was not officially assessed due to limited ROM and significant increase in pain/high irritability of symptoms  Patient session focused on patient education on anatomy and physiology concerning current dx, techniques for decreasing deficits through HEP, and appropriate use of modalities  Patient educated on HEP with verbal instructions and handouts for patient reference  Patient educated on treatment plan at this time  Patient benefiting from skilled hand therapy OT to reduce deficits to improve independence with daily activities  Discussed with patient regarding getting an OTC wrist cock up brace to wear at night time vs a custom fabricated wrist cock up splint  Plan:   Focus on AROM/PROM, massage, edema management, joint mobilization, strengthening and modalities as seen fit to improve ability to complete daily activites with ease     POC: 06/29/2023 - 08/24/2023

## 2023-07-03 ENCOUNTER — OFFICE VISIT (OUTPATIENT)
Dept: OCCUPATIONAL THERAPY | Facility: CLINIC | Age: 53
End: 2023-07-03
Payer: COMMERCIAL

## 2023-07-03 DIAGNOSIS — M77.12 LATERAL EPICONDYLITIS OF LEFT ELBOW: Primary | ICD-10-CM

## 2023-07-03 PROCEDURE — 97032 APPL MODALITY 1+ESTIM EA 15: CPT

## 2023-07-03 PROCEDURE — 97110 THERAPEUTIC EXERCISES: CPT

## 2023-07-03 NOTE — PROGRESS NOTES
Daily Note     Today's date: 7/3/2023  Patient name: Titus Macias  : 1970  MRN: [id number]  Referring provider: Chaz Alvarez DO  Dx:   Encounter Diagnosis     ICD-10-CM    1. Lateral epicondylitis of left elbow  M77.12           Start Time: 1630  Stop Time: 1715  Total time in clinic (min): 45 minutes    Subjective: Patient reports consistency with HEP from initial evaluation. Patient notes improvement in ROM compared to initial evaluation and as order the OTC wrist cock up brace and has been wearing it at nighttime. Objective: See treatment diary below         Precautions: Universal. Lateral epicondylitis, partial triceps tear, and cubital tunnel syndrome. Wears an elbow strap. Tubigrip Size F        Visit Tracker: NO Auth Req. BOMN. Copay $50. 30 visits not combined  Date   IE 7/3                                             X                                         Manuals HEP 7/3/2023                       Ther Ex     Education on Dx and HEP x x10min   Active full fist x 3x10   Hook fist x 3x10   Towel slides on table top/wall x 3x10   Wrist AROM (flex/ext & RD/UD) x 3x10   Forearm rotation Pro/Sup x 3x10   Elbow ROM (flex/ext) x 3x10   Ulnar Nerve Glides  Next Session   Therapist assisted gentle PROM/STM  x10min        Ther Activity     Wall Pullies  2x10   Cone Stack Cabinet   2x10   Towel scrunches  2x10             Modalities     MHP x x5min   ESTM -premod  x10min starting at 7.0 Cv volts       Assessment:   Patient tolerated session well. Session focused on HEP education, range of motion, patient education and strengthening to improve functional task performance with daily activities. Patient received PreMod Electric Stimulation to the lateral elbow at sensory level (80-150HZ) to decrease pain to allow for increased ability to perform ADLs. 2in x 2in electrode padswere used at an average intensity of starting at 7.0 Cv volts.  Skin was assessed ore and post tx with no adverse effects noted. Therapist attended to patient throughout the entire duration of ESTM treatment to adjust intensity and checking up on patient. Patient tolerated all TA & TE with good tolerance and minimal complaints of pain on the lateral side of the elbow. Patient progressing well towards goals. Patient benefiting from skilled hand therapy OT to reduce deficits to improve independence with daily activities. Plan:   Focus on AROM/PROM, massage, edema management, joint mobilization, strengthening and modalities as seen fit to improve ability to complete daily activites with ease.    POC: 06/29/2023 - 08/24/2023

## 2023-07-06 ENCOUNTER — OFFICE VISIT (OUTPATIENT)
Dept: OCCUPATIONAL THERAPY | Facility: CLINIC | Age: 53
End: 2023-07-06
Payer: COMMERCIAL

## 2023-07-06 DIAGNOSIS — M77.12 LATERAL EPICONDYLITIS OF LEFT ELBOW: Primary | ICD-10-CM

## 2023-07-06 PROCEDURE — 97110 THERAPEUTIC EXERCISES: CPT

## 2023-07-06 PROCEDURE — 97530 THERAPEUTIC ACTIVITIES: CPT

## 2023-07-06 NOTE — PROGRESS NOTES
Daily Note     Today's date: 2023  Patient name: Jesus Manuel Barnett  : 1970  MRN: [id number]  Referring provider: Rommel Shen DO  Dx:   Encounter Diagnosis     ICD-10-CM    1. Lateral epicondylitis of left elbow  M77.12           Start Time: 1500  Stop Time: 1545  Total time in clinic (min): 45 minutes    Subjective: Patient reports continued improvement in ROM to complete daily functional tasks, such as being able to put contacts in this morning. Objective: See treatment diary below         Precautions: Universal. Lateral epicondylitis, partial triceps tear, and cubital tunnel syndrome. Wears an elbow strap. Tubigrip Size F        Visit Tracker: NO Auth Req. BOMN. Copay $50. 30 visits not combined  Date   IE 7/3 7/6                                            X                                         Manuals HEP 2023                       Ther Ex     Education on Dx and HEP x x10min   Active full fist x 3x10   Hook fist x 3x10   Towel slides on table top/wall x 3x10   Wrist AROM (flex/ext & RD/UD) x 3x10   Forearm rotation Pro/Sup x 3x10   Elbow ROM (flex/ext) x 3x10   Ulnar Nerve Glides  Next Session   Therapist assisted gentle PROM/STM  x10min        Ther Activity     Wall Pullies  2x10   Cone Stack Cabinet   2x10   Towel scrunches  x10             Modalities     MHP x x5min   ESTM -premod  x10min starting at 10.5 Cv volts       Assessment:   Patient tolerated session well. Session focused on HEP education, range of motion, patient education and strengthening to improve functional task performance with daily activities. Patient received PreMod Electric Stimulation to the lateral elbow at sensory level (80-150HZ) to decrease pain to allow for increased ability to perform ADLs. 2in x 2in electrode padswere used at an average intensity of starting at 10.5 Cv volts. Skin was assessed ore and post tx with no adverse effects noted.  Therapist attended to patient throughout the entire duration of ESTM treatment to adjust intensity and checking up on patient. Patient tolerated all TA & TE with good tolerance and minimal complaints of pain on the lateral side of the elbow. Patient progressing well towards goals. Patient benefiting from skilled hand therapy OT to reduce deficits to improve independence with daily activities. Plan:   Focus on AROM/PROM, massage, edema management, joint mobilization, strengthening and modalities as seen fit to improve ability to complete daily activites with ease.    POC: 06/29/2023 - 08/24/2023

## 2023-07-11 ENCOUNTER — OFFICE VISIT (OUTPATIENT)
Dept: OCCUPATIONAL THERAPY | Facility: CLINIC | Age: 53
End: 2023-07-11
Payer: COMMERCIAL

## 2023-07-11 DIAGNOSIS — M77.12 LATERAL EPICONDYLITIS OF LEFT ELBOW: Primary | ICD-10-CM

## 2023-07-11 PROCEDURE — 97110 THERAPEUTIC EXERCISES: CPT

## 2023-07-11 NOTE — PROGRESS NOTES
Daily Note     Today's date: 2023  Patient name: Otilio Gresham  : 1970  MRN: [id number]  Referring provider: Courtney Farmer DO  Dx:   Encounter Diagnosis     ICD-10-CM    1. Lateral epicondylitis of left elbow  M77.12           Start Time: 930  Stop Time: 1015  Total time in clinic (min): 45 minutes    Subjective: Patient reports pain in the fingers, specifically the thumb is the second dorsal compartment and the posterior dorsal side of the forearm. Objective: See treatment diary below         Precautions: Universal. Lateral epicondylitis, partial triceps tear, and cubital tunnel syndrome. Wears an elbow strap as needed    Tubigrip Size F        Visit Tracker: NO Auth Req. BOMN. Copay $50. 30 visits not combined  Date   IE 7/3 7/6 7/11                                           X                                         Manuals HEP 2023                       Ther Ex     Education on Dx and HEP x x10min   Active full fist x 3x10   Hook fist x 3x10   Towel slides on table top/wall x    Wrist AROM (flex/ext & RD/UD) x 3x10   Forearm rotation Pro/Sup x 3x10   Elbow ROM (flex/ext) x 3x10   Ulnar Nerve Glides  2x10   Radial Nerve Glides x 2x10   Therapist assisted gentle PROM/STM  x10min        Ther Activity     Wall Pullies     Cone Stack forearm rotation     Towel scrunches               Modalities     MHP x x5min   ESTM -premod         Assessment:   Patient tolerated session well. Session focused on HEP education, range of motion, patient education and strengthening to improve functional task performance with daily activities. Patient tolerated all TE with good tolerance and minimal complaints of pain on the lateral side of the elbow. Patient reports pain along the radial nerve distrubution this date. Provided patient with education and HEP for radial nerve glides at today's session. Patient progressing well towards goals.  Patient benefiting from skilled hand therapy OT to reduce deficits to improve independence with daily activities. Plan:   Focus on AROM/PROM, massage, edema management, joint mobilization, strengthening and modalities as seen fit to improve ability to complete daily activites with ease.    POC: 06/29/2023 - 08/24/2023

## 2023-07-18 ENCOUNTER — TELEPHONE (OUTPATIENT)
Dept: BARIATRICS | Facility: CLINIC | Age: 53
End: 2023-07-18

## 2023-07-18 ENCOUNTER — OFFICE VISIT (OUTPATIENT)
Dept: OCCUPATIONAL THERAPY | Facility: CLINIC | Age: 53
End: 2023-07-18
Payer: COMMERCIAL

## 2023-07-18 DIAGNOSIS — M77.12 LATERAL EPICONDYLITIS OF LEFT ELBOW: Primary | ICD-10-CM

## 2023-07-18 DIAGNOSIS — Z98.84 BARIATRIC SURGERY STATUS: ICD-10-CM

## 2023-07-18 DIAGNOSIS — E66.01 OBESITY, CLASS III, BMI 40-49.9 (MORBID OBESITY) (HCC): ICD-10-CM

## 2023-07-18 DIAGNOSIS — R12 HEARTBURN: Primary | ICD-10-CM

## 2023-07-18 PROCEDURE — 97530 THERAPEUTIC ACTIVITIES: CPT

## 2023-07-18 PROCEDURE — 97110 THERAPEUTIC EXERCISES: CPT

## 2023-07-18 RX ORDER — PANTOPRAZOLE SODIUM 40 MG/1
40 TABLET, DELAYED RELEASE ORAL DAILY
Qty: 90 TABLET | Refills: 0 | Status: SHIPPED | OUTPATIENT
Start: 2023-07-18

## 2023-07-18 RX ORDER — FAMOTIDINE 20 MG/1
20 TABLET, FILM COATED ORAL DAILY
Qty: 90 TABLET | Refills: 1 | Status: SHIPPED | OUTPATIENT
Start: 2023-07-18

## 2023-07-18 NOTE — PROGRESS NOTES
Daily Note     Today's date: 2023  Patient name: Surjit Webber  : 1970  MRN: [id number]  Referring provider: Feliz Bence, DO  Dx:   Encounter Diagnosis     ICD-10-CM    1. Lateral epicondylitis of left elbow  M77.12                      Subjective: Patient reports pain in the fingers, specifically the middle and ring finger and the posterior dorsal side of the forearm, middle of the joint capsule. Objective: See treatment diary below       Precautions: Universal. Lateral epicondylitis, partial triceps tear, and cubital tunnel syndrome. Wears an elbow strap as needed    Tubigrip Size F      Visit Tracker: NO Auth Req. BOMN. Copay $50. 30 visits not combined  Date   IE 7/3 7/6 7/11 7/18                                          X                                         Manuals HEP 2023                       Ther Ex     Education on Dx and HEP x x5min   Active full fist x x20   Hook fist x x20   Towel slides on table top/wall x    Wrist AROM (flex/ext & RD/UD) x 2x10   Forearm rotation Pro/Sup with flexbar x x20    Elbow ROM (flex/ext) x 3x10   Ulnar Nerve Glides  2x10   Radial Nerve Glides x 2x10   Therapist assisted gentle PROM/STM  x10min        Ther Activity     Wall Pullies  2 x10    Cone Stack forearm rotation  x2   Towel scrunches     Flexbar N's/U's  x10    Ball Roll Out  x10   Theraband triceps ext. x10 Y theraband                  Modalities     MHP x x5min   ESTM -premod         Assessment:   Patient tolerated session well with minimal upgrades in activities. Session focused on HEP education, range of motion, patient education and strengthening to improve functional task performance with daily activities. Patient tolerated all TE with good tolerance and minimal complaints of pain on the lateral side of the elbow. Patient reports pain along the radial nerve distrubution this date. Provided patient with education and HEP for radial nerve glides at today's session.  Patient progressing well towards goals. Patient benefiting from skilled hand therapy OT to reduce deficits to improve independence with daily activities. Plan:   Focus on AROM/PROM, massage, edema management, joint mobilization, strengthening and modalities as seen fit to improve ability to complete daily activites with ease.    POC: 06/29/2023 - 08/24/2023

## 2023-07-19 ENCOUNTER — RA CDI HCC (OUTPATIENT)
Dept: OTHER | Facility: HOSPITAL | Age: 53
End: 2023-07-19

## 2023-07-19 NOTE — PROGRESS NOTES
[address] [facility] coding opportunities       Chart reviewed, no opportunity found: CHART REVIEWED, NO OPPORTUNITY FOUND        Patients Insurance        Commercial Insurance: [address]

## 2023-07-25 ENCOUNTER — OFFICE VISIT (OUTPATIENT)
Dept: OCCUPATIONAL THERAPY | Facility: CLINIC | Age: 53
End: 2023-07-25
Payer: COMMERCIAL

## 2023-07-25 DIAGNOSIS — M77.12 LATERAL EPICONDYLITIS OF LEFT ELBOW: Primary | ICD-10-CM

## 2023-07-25 PROCEDURE — 97110 THERAPEUTIC EXERCISES: CPT

## 2023-07-25 PROCEDURE — 97530 THERAPEUTIC ACTIVITIES: CPT

## 2023-07-25 NOTE — PROGRESS NOTES
Daily Note     Today's date: 2023  Patient name: Marisa Braun  : 1970  MRN: [id number]  Referring provider: Joss Madera DO  Dx:   Encounter Diagnosis     ICD-10-CM    1. Lateral epicondylitis of left elbow  M77.12                      Subjective: Patient reports pain on dorsum of forearm (not constant). Objective: See treatment diary below       Precautions: Universal. Lateral epicondylitis, partial triceps tear, and cubital tunnel syndrome. Wears an elbow strap as needed    Tubigrip Size F      Visit Tracker: NO Auth Req. BOMN. Copay $50. 30 visits not combined  Date   IE 7/3 7/6 7/11 7/18 7/25                                         X                                         Manuals HEP 2023                       Ther Ex     Education on Dx and HEP x x5min   Active full fist x    Wrist flexor stretch  x 5x 5seconds   Towel slides on table top/wall x    Wrist AROM (flex/ext & RD/UD) x 2x10   Forearm rotation Pro/Sup with hammer x x2 lbs   Bicep Curls 3 ways x x10 2lb   Ulnar Nerve Glides  2x10   Radial Nerve Glides x 2x10   Therapist assisted gentle PROM/STM  x10min        Ther Activity     Wall Pullies  2 x10    Cone Stack forearm rotation  x2   Sponges wrist circles with arm extended   Whole bin    Flexbar N's/U's  x10 R   Ball Roll Out  x10   Theraband triceps ext. x10 Y theraband                  Modalities     MHP x x5min   ESTM -premod         Assessment:   Patient tolerated session well with minimal reports of pain, able to complete all activities with no rest breaks required. Session focused on HEP education, range of motion, patient education and strengthening to improve functional task performance with daily activities. Patient progressing well towards goals. Patient benefiting from skilled hand therapy OT to reduce deficits to improve independence with daily activities.         Plan:   Focus on AROM/PROM, massage, edema management, joint mobilization, strengthening and modalities as seen fit to improve ability to complete daily activites with ease.    POC: 06/29/2023 - 08/24/2023

## 2023-07-27 ENCOUNTER — APPOINTMENT (OUTPATIENT)
Dept: OCCUPATIONAL THERAPY | Facility: CLINIC | Age: 53
End: 2023-07-27
Payer: COMMERCIAL

## 2023-09-13 DIAGNOSIS — I10 ESSENTIAL HYPERTENSION: ICD-10-CM

## 2023-09-13 RX ORDER — AMLODIPINE BESYLATE 5 MG/1
TABLET ORAL
Qty: 90 TABLET | Refills: 1 | Status: SHIPPED | OUTPATIENT
Start: 2023-09-13

## 2023-10-30 ENCOUNTER — LAB (OUTPATIENT)
Dept: LAB | Facility: CLINIC | Age: 53
End: 2023-10-30
Payer: COMMERCIAL

## 2023-10-30 DIAGNOSIS — E78.2 MIXED HYPERLIPIDEMIA: ICD-10-CM

## 2023-10-30 DIAGNOSIS — I10 ESSENTIAL HYPERTENSION, MALIGNANT: ICD-10-CM

## 2023-10-30 DIAGNOSIS — E13.69 OTHER SPECIFIED DIABETES MELLITUS WITH OTHER SPECIFIED COMPLICATION, UNSPECIFIED WHETHER LONG TERM INSULIN USE (HCC): Primary | ICD-10-CM

## 2023-10-30 DIAGNOSIS — Z98.84 STATUS POST GASTRIC BYPASS FOR OBESITY: ICD-10-CM

## 2023-10-30 DIAGNOSIS — K91.2 POSTSURGICAL MALABSORPTION: ICD-10-CM

## 2023-10-30 LAB
25(OH)D3 SERPL-MCNC: 45.9 NG/ML (ref 30–100)
ALBUMIN SERPL BCP-MCNC: 4.2 G/DL (ref 3.5–5)
ALP SERPL-CCNC: 44 U/L (ref 34–104)
ALT SERPL W P-5'-P-CCNC: 24 U/L (ref 7–52)
ANION GAP SERPL CALCULATED.3IONS-SCNC: 9 MMOL/L
AST SERPL W P-5'-P-CCNC: 22 U/L (ref 13–39)
BILIRUB SERPL-MCNC: 0.55 MG/DL (ref 0.2–1)
BUN SERPL-MCNC: 14 MG/DL (ref 5–25)
CALCIUM SERPL-MCNC: 9.6 MG/DL (ref 8.4–10.2)
CHLORIDE SERPL-SCNC: 104 MMOL/L (ref 96–108)
CHOLEST SERPL-MCNC: 125 MG/DL
CO2 SERPL-SCNC: 27 MMOL/L (ref 21–32)
CREAT SERPL-MCNC: 0.6 MG/DL (ref 0.6–1.3)
ERYTHROCYTE [DISTWIDTH] IN BLOOD BY AUTOMATED COUNT: 13.4 % (ref 11.6–15.1)
EST. AVERAGE GLUCOSE BLD GHB EST-MCNC: 94 MG/DL
FERRITIN SERPL-MCNC: 75 NG/ML (ref 11–307)
GFR SERPL CREATININE-BSD FRML MDRD: 103 ML/MIN/1.73SQ M
GLUCOSE P FAST SERPL-MCNC: 92 MG/DL (ref 65–99)
HBA1C MFR BLD: 4.9 %
HCT VFR BLD AUTO: 45.5 % (ref 34.8–46.1)
HDLC SERPL-MCNC: 47 MG/DL
HGB BLD-MCNC: 15 G/DL (ref 11.5–15.4)
IRON SATN MFR SERPL: 25 % (ref 15–50)
IRON SERPL-MCNC: 72 UG/DL (ref 50–212)
LDLC SERPL CALC-MCNC: 53 MG/DL (ref 0–100)
MCH RBC QN AUTO: 29.4 PG (ref 26.8–34.3)
MCHC RBC AUTO-ENTMCNC: 33 G/DL (ref 31.4–37.4)
MCV RBC AUTO: 89 FL (ref 82–98)
NONHDLC SERPL-MCNC: 78 MG/DL
PLATELET # BLD AUTO: 191 THOUSANDS/UL (ref 149–390)
PMV BLD AUTO: 13 FL (ref 8.9–12.7)
POTASSIUM SERPL-SCNC: 3.9 MMOL/L (ref 3.5–5.3)
PROT SERPL-MCNC: 7.3 G/DL (ref 6.4–8.4)
PTH-INTACT SERPL-MCNC: 27.4 PG/ML (ref 12–88)
RBC # BLD AUTO: 5.1 MILLION/UL (ref 3.81–5.12)
SODIUM SERPL-SCNC: 140 MMOL/L (ref 135–147)
TIBC SERPL-MCNC: 291 UG/DL (ref 250–450)
TRIGL SERPL-MCNC: 123 MG/DL
UIBC SERPL-MCNC: 219 UG/DL (ref 155–355)
VIT B12 SERPL-MCNC: 531 PG/ML (ref 180–914)
WBC # BLD AUTO: 6.23 THOUSAND/UL (ref 4.31–10.16)

## 2023-10-30 PROCEDURE — 82306 VITAMIN D 25 HYDROXY: CPT

## 2023-10-30 PROCEDURE — 83970 ASSAY OF PARATHORMONE: CPT

## 2023-10-30 PROCEDURE — 36415 COLL VENOUS BLD VENIPUNCTURE: CPT

## 2023-10-30 PROCEDURE — 80061 LIPID PANEL: CPT

## 2023-10-30 PROCEDURE — 85027 COMPLETE CBC AUTOMATED: CPT

## 2023-10-30 PROCEDURE — 82728 ASSAY OF FERRITIN: CPT

## 2023-10-30 PROCEDURE — 83550 IRON BINDING TEST: CPT

## 2023-10-30 PROCEDURE — 83036 HEMOGLOBIN GLYCOSYLATED A1C: CPT

## 2023-10-30 PROCEDURE — 84425 ASSAY OF VITAMIN B-1: CPT

## 2023-10-30 PROCEDURE — 80053 COMPREHEN METABOLIC PANEL: CPT

## 2023-10-30 PROCEDURE — 84590 ASSAY OF VITAMIN A: CPT

## 2023-10-30 PROCEDURE — 83540 ASSAY OF IRON: CPT

## 2023-10-30 PROCEDURE — 82607 VITAMIN B-12: CPT

## 2023-10-30 PROCEDURE — 84630 ASSAY OF ZINC: CPT

## 2023-10-30 NOTE — ASSESSMENT & PLAN NOTE
-s/p laparoscopic Shaila-en-Y gastric bypass with Dr. Celia Middleton on 01/17/23. EWL on schedule for expected post surgical weight loss at this time. Initial: 311.6lbs  Current: 178.4lbs  EWL: 87%  Jean: current  Current BMI is Body mass index is 27.94 kg/m². Tolerating a regular diet-yes  Eating at least 60 grams of protein per day-yes  Following 30/60 minute rule with liquids-yes  Drinking at least 64 ounces of fluid per day-yes  Drinking carbonated beverages-no  Sufficient exercise-yes and getting a treadmill  Using NSAIDs regularly-no  Using nicotine-no  Using alcohol-no.  Advised about the risks of alcohol s/p bariatric surgery and recommend avoiding all alcohol

## 2023-10-30 NOTE — ASSESSMENT & PLAN NOTE
-At risk for malabsorption of vitamins/minerals secondary to malabsorption and restriction of intake from bariatric surgery  -Currently taking adequate postop bariatric surgery vitamin supplementation: BA MVI and calcium citrate 500mg TID    -Last set of bariatric labs completed on 10/30/23 and showed vitamins WNL    -Next set of bariatric labs ordered for approximately 6 months  -Patient received education about the importance of adhering to a lifelong supplementation regimen to avoid vitamin/mineral deficiencies

## 2023-10-30 NOTE — ASSESSMENT & PLAN NOTE
Lab Results   Component Value Date    HGBA1C 5.2 04/21/2023    HGBA1C 5.2 04/21/2023     -Repeat A1C ordered  -continue healthy diet and exercise

## 2023-10-30 NOTE — ASSESSMENT & PLAN NOTE
-Avoid fried foods and trans fat, limit saturated fats and refined carbohydrates  -Increase fish/omega 3 FA consumption  -Increase physical activity  -Improving   -on statin, obtain lipid panel again in 6 months

## 2023-11-02 ENCOUNTER — OFFICE VISIT (OUTPATIENT)
Dept: BARIATRICS | Facility: CLINIC | Age: 53
End: 2023-11-02
Payer: COMMERCIAL

## 2023-11-02 VITALS
WEIGHT: 178.4 LBS | HEART RATE: 68 BPM | SYSTOLIC BLOOD PRESSURE: 110 MMHG | HEIGHT: 67 IN | BODY MASS INDEX: 28 KG/M2 | DIASTOLIC BLOOD PRESSURE: 70 MMHG

## 2023-11-02 DIAGNOSIS — E78.2 MIXED HYPERLIPIDEMIA: ICD-10-CM

## 2023-11-02 DIAGNOSIS — K91.2 POSTSURGICAL MALABSORPTION: ICD-10-CM

## 2023-11-02 DIAGNOSIS — Z48.815 ENCOUNTER FOR SURGICAL AFTERCARE FOLLOWING SURGERY OF DIGESTIVE SYSTEM: Primary | ICD-10-CM

## 2023-11-02 DIAGNOSIS — E11.9 NEW ONSET TYPE 2 DIABETES MELLITUS (HCC): ICD-10-CM

## 2023-11-02 DIAGNOSIS — I10 ESSENTIAL HYPERTENSION: ICD-10-CM

## 2023-11-02 PROCEDURE — 99214 OFFICE O/P EST MOD 30 MIN: CPT | Performed by: PHYSICIAN ASSISTANT

## 2023-11-02 NOTE — PATIENT INSTRUCTIONS
GOALS:   Continued/Maintain healthy weight loss with good nutrition intakes. Adequate hydration with at least 64oz. fluid intake. Normal vitamin and mineral levels. Exercise as tolerated. Follow-up in 6 months. We kindly ask that your arrive 15 minutes before your scheduled appointment time with your provider to allow our staff to room you, get your vital signs and update your chart. Follow diet as discussed. Get lab work done in 6 months. You have been given a lab slip today. Please call the office if you need a replacement. It is recommended to check with your insurance BEFORE getting labs done to make sure they are covered by your policy. Also, please check with your PCP and other providers before getting labs to avoid duplicate labs. Make sure to HOLD any multivitamins that may contain biotin and any biotin supplements FOR 5 DAYS before any labs since it can affect the results. Follow vitamin and mineral recommendations as reviewed with you. Call our office if you have any problems with abdominal pain especially associated with fever, chills, nausea, vomiting or any other concerns. All  Post-bariatric surgery patients should be aware that very small quantities of any alcohol can cause impairment and it is very possible not to feel the effect. The effect can be in the system for several hours. It is also a stomach irritant. It is advised to AVOID alcohol, Nonsteroidal antiinflammatory drugs (NSAIDS) and nicotine of all forms . Any of these can cause stomach irritation/pain.

## 2023-11-02 NOTE — PROGRESS NOTES
Assessment/Plan:    Encounter for surgical aftercare following surgery of digestive system   -s/p laparoscopic Shaila-en-Y gastric bypass with Dr. Sharmin Alexis on 01/17/23. EWL on schedule for expected post surgical weight loss at this time. Initial: 311.6lbs  Current: 178.4lbs  EWL: 87%  Jean: current  Current BMI is Body mass index is 27.94 kg/m². Tolerating a regular diet-yes  Eating at least 60 grams of protein per day-yes  Following 30/60 minute rule with liquids-yes  Drinking at least 64 ounces of fluid per day-yes  Drinking carbonated beverages-no  Sufficient exercise-yes and getting a treadmill  Using NSAIDs regularly-no  Using nicotine-no  Using alcohol-no.  Advised about the risks of alcohol s/p bariatric surgery and recommend avoiding all alcohol      Postsurgical malabsorption  -At risk for malabsorption of vitamins/minerals secondary to malabsorption and restriction of intake from bariatric surgery  -Currently taking adequate postop bariatric surgery vitamin supplementation: BA MVI and calcium citrate 500mg TID    -Last set of bariatric labs completed on 10/30/23 and showed vitamins WNL    -Next set of bariatric labs ordered for approximately 6 months  -Patient received education about the importance of adhering to a lifelong supplementation regimen to avoid vitamin/mineral deficiencies       New onset type 2 diabetes mellitus ([address])    Lab Results   Component Value Date    HGBA1C 5.2 04/21/2023    HGBA1C 5.2 04/21/2023     -Repeat A1C ordered  -continue healthy diet and exercise    Essential hypertension  -on amlodipine  -Continue monitoring and management with prescribing provider      Mixed hyperlipidemia  -Avoid fried foods and trans fat, limit saturated fats and refined carbohydrates  -Increase fish/omega 3 FA consumption  -Increase physical activity  -Improving   -on statin, obtain lipid panel again in 6 months     Diagnoses and all orders for this visit:    Encounter for surgical aftercare following surgery of digestive system    Postsurgical malabsorption  -     CBC and differential; Future  -     Comprehensive metabolic panel; Future  -     Folate; Future  -     Hemoglobin A1C; Future  -     Iron Panel (Includes Ferritin, Iron Sat%, Iron, and TIBC); Future  -     Zinc; Future  -     Vitamin D 25 hydroxy; Future  -     Vitamin B12; Future  -     Vitamin B1, whole blood; Future  -     Vitamin A; Future  -     PTH, intact; Future  -     Lipid panel; Future  -     CBC and differential  -     Comprehensive metabolic panel  -     Folate  -     Hemoglobin A1C  -     Zinc  -     Vitamin D 25 hydroxy  -     Vitamin B12  -     Vitamin B1, whole blood  -     Vitamin A  -     PTH, intact  -     Lipid panel    New onset type 2 diabetes mellitus (HCC)  -     Hemoglobin A1C; Future  -     Hemoglobin A1C    Essential hypertension    Mixed hyperlipidemia  -     Lipid panel; Future  -     Lipid panel          Subjective:      Patient ID: Licha Sherman is a 46 y.o. female. -s/p laparoscopic Shaila-en-Y gastric bypass with Dr. Kami Garner on 01/17/23. Presents to the office today for routine follow up. Tolerating diet without issues; denies N/V, dysphagia, reflux. Overall doing Well.  for [employer]. Kids in college. Diet Recall: (1200kcals, 60-80g protein)  B - cottage cheese and fruit  L - green salad w/ eggs and lunch meat  D - pork loin and veggies or salmon and bowl with cauliflower     Fluids - 80oz water    The following portions of the patient's history were reviewed and updated as appropriate: allergies, current medications, past family history, past medical history, past social history, past surgical history and problem list.    Review of Systems   Constitutional:  Negative for chills and fever. Unexpected weight change: planned weight loss. HENT:  Negative for trouble swallowing. Respiratory:  Negative for cough and shortness of breath.     Cardiovascular:  Negative for chest pain and palpitations. Gastrointestinal:  Negative for abdominal pain, constipation, diarrhea, nausea and vomiting. Neurological:  Negative for dizziness. Psychiatric/Behavioral:          Denies anxiety and depression         Objective:      /70   Pulse 68   Ht 5' 7" (1.702 m)   Wt 80.9 kg (178 lb 6.4 oz)   BMI 27.94 kg/m²     Colonoscopy-Completed       Physical Exam  Vitals reviewed. Constitutional:       General: She is not in acute distress. Appearance: She is well-developed. HENT:      Head: Normocephalic and atraumatic. Eyes:      General: No scleral icterus. Cardiovascular:      Rate and Rhythm: Normal rate and regular rhythm. Pulmonary:      Effort: Pulmonary effort is normal. No respiratory distress. Abdominal:      General: There is no distension. Palpations: Abdomen is soft. Tenderness: There is no abdominal tenderness. Skin:     General: Skin is warm and dry. Neurological:      Mental Status: She is alert. Psychiatric:         Mood and Affect: Mood normal.         Behavior: Behavior normal.           BARRIERS: none identified    GOALS:   Continued/Maintain healthy weight loss with good nutrition intakes. Adequate hydration with at least 64oz. fluid intake. Normal vitamin and mineral levels. Exercise as tolerated. Follow-up in 6 months. We kindly ask that your arrive 15 minutes before your scheduled appointment time with your provider to allow our staff to room you, get your vital signs and update your chart. Follow diet as discussed. Get lab work done in 6 months. You have been given a lab slip today. Please call the office if you need a replacement. It is recommended to check with your insurance BEFORE getting labs done to make sure they are covered by your policy. Also, please check with your PCP and other providers before getting labs to avoid duplicate labs.  Make sure to HOLD any multivitamins that may contain biotin and any biotin supplements FOR 5 DAYS before any labs since it can affect the results. Follow vitamin and mineral recommendations as reviewed with you. Call our office if you have any problems with abdominal pain especially associated with fever, chills, nausea, vomiting or any other concerns. All  Post-bariatric surgery patients should be aware that very small quantities of any alcohol can cause impairment and it is very possible not to feel the effect. The effect can be in the system for several hours. It is also a stomach irritant. It is advised to AVOID alcohol, Nonsteroidal antiinflammatory drugs (NSAIDS) and nicotine of all forms . Any of these can cause stomach irritation/pain.

## 2023-11-04 LAB — VIT B1 BLD-SCNC: 155.2 NMOL/L (ref 66.5–200)

## 2023-11-06 LAB — VIT A SERPL-MCNC: 28.7 UG/DL (ref 20.1–62)

## 2023-11-09 LAB — ZINC SERPL-MCNC: 75 UG/DL (ref 44–115)

## 2023-11-15 ENCOUNTER — OFFICE VISIT (OUTPATIENT)
Dept: FAMILY MEDICINE CLINIC | Facility: CLINIC | Age: 53
End: 2023-11-15
Payer: COMMERCIAL

## 2023-11-15 VITALS
TEMPERATURE: 98 F | WEIGHT: 177 LBS | DIASTOLIC BLOOD PRESSURE: 78 MMHG | RESPIRATION RATE: 16 BRPM | SYSTOLIC BLOOD PRESSURE: 122 MMHG | BODY MASS INDEX: 27.72 KG/M2

## 2023-11-15 DIAGNOSIS — K91.2 POSTSURGICAL MALABSORPTION: ICD-10-CM

## 2023-11-15 DIAGNOSIS — E11.9 NEW ONSET TYPE 2 DIABETES MELLITUS (HCC): ICD-10-CM

## 2023-11-15 DIAGNOSIS — E78.2 MIXED HYPERLIPIDEMIA: ICD-10-CM

## 2023-11-15 DIAGNOSIS — I10 ESSENTIAL HYPERTENSION: Primary | ICD-10-CM

## 2023-11-15 PROBLEM — Z48.815 ENCOUNTER FOR SURGICAL AFTERCARE FOLLOWING SURGERY OF DIGESTIVE SYSTEM: Status: RESOLVED | Noted: 2023-01-23 | Resolved: 2023-11-15

## 2023-11-15 PROCEDURE — 99214 OFFICE O/P EST MOD 30 MIN: CPT | Performed by: FAMILY MEDICINE

## 2023-11-15 NOTE — PROGRESS NOTES
Assessment/Plan:    1. Essential hypertension  Assessment & Plan:  stable    Orders:  -     CBC; Future; Expected date: 04/28/2024  -     Comprehensive metabolic panel; Future; Expected date: 04/28/2024  -     Lipid Panel with Direct LDL reflex; Future; Expected date: 04/28/2024    2. New onset type 2 diabetes mellitus ([address])    3. Mixed hyperlipidemia  -     CBC; Future; Expected date: 04/28/2024  -     Comprehensive metabolic panel; Future; Expected date: 04/28/2024  -     Lipid Panel with Direct LDL reflex; Future; Expected date: 04/28/2024    4. Postsurgical malabsorption    New onset diabetes was added by a different provider - pt is no longer diabetic. Dx removed. There are no Patient Instructions on file for this visit. Return in about 6 months (around 5/15/2024) for Recheck. Subjective:      Patient ID: Caleb Chauhan is a 46 y.o. female. Chief Complaint   Patient presents with   • Follow-up   • Results     Sas/cma       Pt is here for follow up   Had labs        The following portions of the patient's history were reviewed and updated as appropriate: allergies, current medications, past family history, past medical history, past social history, past surgical history and problem list.    Review of Systems   Constitutional: Negative. Negative for activity change, appetite change, chills, diaphoresis and fatigue. HENT: Negative. Negative for dental problem, ear pain, sinus pressure and sore throat. Eyes: Negative. Negative for photophobia, pain, discharge, redness, itching and visual disturbance. Respiratory:  Negative for apnea and chest tightness. Cardiovascular: Negative. Negative for chest pain, palpitations and leg swelling. Gastrointestinal: Negative. Negative for abdominal distention, abdominal pain, constipation and diarrhea. Endocrine: Negative. Negative for cold intolerance and heat intolerance. Genitourinary: Negative.   Negative for difficulty urinating and dyspareunia. Musculoskeletal: Negative. Negative for arthralgias and back pain. Skin: Negative. Allergic/Immunologic: Negative for environmental allergies. Neurological: Negative. Negative for dizziness. Psychiatric/Behavioral: Negative. Negative for agitation. Current Outpatient Medications   Medication Sig Dispense Refill   • amLODIPine (NORVASC) 5 mg tablet take 1 tablet by mouth once daily 90 tablet 1   • Calcium Carb-Cholecalciferol (Calcium 500/D) 500-10 MG-MCG CHEW Chew 500 mg 3 (three) times a day     • Collagen-Vitamin C-Biotin (Collagen 1500/C) 500-50-0.8 MG CAPS Take by mouth     • fluticasone (FLONASE) 50 mcg/act nasal spray 1 spray into each nostril daily     • levocetirizine (XYZAL) 5 MG tablet Take 1 tablet (5 mg total) by mouth every evening 90 tablet 1   • Multiple Vitamins-Minerals (BARIATRIC MULTIVITAMINS/IRON PO) Take by mouth     • rosuvastatin (CRESTOR) 10 MG tablet Take 1 tablet (10 mg total) by mouth daily 90 tablet 3     No current facility-administered medications for this visit. Objective:    /78   Temp 98 °F (36.7 °C)   Resp 16   Wt 80.3 kg (177 lb)   BMI 27.72 kg/m²        Physical Exam  Vitals and nursing note reviewed. Constitutional:       General: She is not in acute distress. Appearance: She is well-developed. She is not diaphoretic. HENT:      Head: Normocephalic and atraumatic. Right Ear: External ear normal.      Left Ear: External ear normal.      Nose: Nose normal.      Mouth/Throat:      Pharynx: No oropharyngeal exudate. Eyes:      General: No scleral icterus. Right eye: No discharge. Left eye: No discharge. Pupils: Pupils are equal, round, and reactive to light. Neck:      Thyroid: No thyromegaly. Cardiovascular:      Rate and Rhythm: Normal rate. Heart sounds: Normal heart sounds. No murmur heard. Pulmonary:      Effort: Pulmonary effort is normal. No respiratory distress.       Breath sounds: Normal breath sounds. No wheezing. Abdominal:      General: Bowel sounds are normal. There is no distension. Palpations: Abdomen is soft. There is no mass. Tenderness: There is no abdominal tenderness. There is no guarding or rebound. Musculoskeletal:         General: Normal range of motion. Skin:     General: Skin is warm and dry. Findings: No erythema or rash. Neurological:      Mental Status: She is alert.       Coordination: Coordination normal.      Deep Tendon Reflexes: Reflexes normal.   Psychiatric:         Behavior: Behavior normal.              Recent Results (from the past 672 hour(s))   Iron Saturation %    Collection Time: 10/30/23  7:50 AM   Result Value Ref Range    Iron Saturation 25 15 - 50 %    TIBC 291 250 - 450 ug/dL    Iron 72 50 - 212 ug/dL    UIBC 219 155 - 355 ug/dL   CBC and Platelet    Collection Time: 10/30/23  7:50 AM   Result Value Ref Range    WBC 6.23 4.31 - 10.16 Thousand/uL    RBC 5.10 3.81 - 5.12 Million/uL    Hemoglobin 15.0 11.5 - 15.4 g/dL    Hematocrit 45.5 34.8 - 46.1 %    MCV 89 82 - 98 fL    MCH 29.4 26.8 - 34.3 pg    MCHC 33.0 31.4 - 37.4 g/dL    RDW 13.4 11.6 - 15.1 %    Platelets 790 360 - 256 Thousands/uL    MPV 13.0 (H) 8.9 - 12.7 fL   Comprehensive metabolic panel    Collection Time: 10/30/23  7:50 AM   Result Value Ref Range    Sodium 140 135 - 147 mmol/L    Potassium 3.9 3.5 - 5.3 mmol/L    Chloride 104 96 - 108 mmol/L    CO2 27 21 - 32 mmol/L    ANION GAP 9 mmol/L    BUN 14 5 - 25 mg/dL    Creatinine 0.60 0.60 - 1.30 mg/dL    Glucose, Fasting 92 65 - 99 mg/dL    Calcium 9.6 8.4 - 10.2 mg/dL    AST 22 13 - 39 U/L    ALT 24 7 - 52 U/L    Alkaline Phosphatase 44 34 - 104 U/L    Total Protein 7.3 6.4 - 8.4 g/dL    Albumin 4.2 3.5 - 5.0 g/dL    Total Bilirubin 0.55 0.20 - 1.00 mg/dL    eGFR 103 ml/min/1.73sq m   Ferritin    Collection Time: 10/30/23  7:50 AM   Result Value Ref Range    Ferritin 75 11 - 307 ng/mL   Lipid panel    Collection Time: 10/30/23  7:50 AM   Result Value Ref Range    Cholesterol 125 See Comment mg/dL    Triglycerides 123 See Comment mg/dL    HDL, Direct 47 (L) >=50 mg/dL    LDL Calculated 53 0 - 100 mg/dL    Non-HDL-Chol (CHOL-HDL) 78 mg/dl   PTH, intact    Collection Time: 10/30/23  7:50 AM   Result Value Ref Range    PTH 27.4 12.0 - 88.0 pg/mL   Vitamin A    Collection Time: 10/30/23  7:50 AM   Result Value Ref Range    Vitamin A 28.7 20.1 - 62.0 ug/dL   Vitamin B1, whole blood    Collection Time: 10/30/23  7:50 AM   Result Value Ref Range    Vitamin B1, Whole Blood 155.2 66.5 - 200.0 nmol/L   Vitamin B12    Collection Time: 10/30/23  7:50 AM   Result Value Ref Range    Vitamin B-12 531 180 - 914 pg/mL   Vitamin D 25 hydroxy    Collection Time: 10/30/23  7:50 AM   Result Value Ref Range    Vit D, 25-Hydroxy 45.9 30.0 - 100.0 ng/mL   Zinc    Collection Time: 10/30/23  7:50 AM   Result Value Ref Range    Zinc 75 44 - 115 ug/dL   Hemoglobin A1C    Collection Time: 10/30/23  7:50 AM   Result Value Ref Range    Hemoglobin A1C 4.9 Normal 4.0-5.6%; PreDiabetic 5.7-6.4%;  Diabetic >=6.5%; Glycemic control for adults with diabetes <7.0% %    EAG 94 mg/dl         Nayla Kumar DO

## 2024-01-07 DIAGNOSIS — I10 ESSENTIAL HYPERTENSION: ICD-10-CM

## 2024-01-08 RX ORDER — AMLODIPINE BESYLATE 5 MG/1
5 TABLET ORAL DAILY
Qty: 90 TABLET | Refills: 1 | Status: SHIPPED | OUTPATIENT
Start: 2024-01-08

## 2024-03-15 PROBLEM — Z48.815 ENCOUNTER FOR SURGICAL AFTERCARE FOLLOWING SURGERY OF DIGESTIVE SYSTEM: Status: ACTIVE | Noted: 2023-01-25

## 2024-04-22 DIAGNOSIS — E78.2 MIXED HYPERLIPIDEMIA: ICD-10-CM

## 2024-04-22 RX ORDER — ROSUVASTATIN CALCIUM 10 MG/1
10 TABLET, COATED ORAL DAILY
Qty: 90 TABLET | Refills: 1 | Status: SHIPPED | OUTPATIENT
Start: 2024-04-22

## 2024-07-19 DIAGNOSIS — I10 ESSENTIAL HYPERTENSION: ICD-10-CM

## 2024-07-19 RX ORDER — AMLODIPINE BESYLATE 5 MG/1
5 TABLET ORAL DAILY
Qty: 100 TABLET | Refills: 1 | Status: SHIPPED | OUTPATIENT
Start: 2024-07-19

## 2024-10-03 DIAGNOSIS — E78.2 MIXED HYPERLIPIDEMIA: ICD-10-CM

## 2024-10-03 RX ORDER — ROSUVASTATIN CALCIUM 10 MG/1
10 TABLET, COATED ORAL DAILY
Qty: 90 TABLET | Refills: 1 | Status: SHIPPED | OUTPATIENT
Start: 2024-10-03

## 2024-10-03 NOTE — TELEPHONE ENCOUNTER
Message sent via [employer].   I requested a refill for my crestor but have not heard anything. Can I get a refill on that?

## 2024-11-17 DIAGNOSIS — I10 ESSENTIAL HYPERTENSION: ICD-10-CM

## 2024-11-20 RX ORDER — AMLODIPINE BESYLATE 5 MG/1
5 TABLET ORAL DAILY
Qty: 100 TABLET | Refills: 0 | Status: SHIPPED | OUTPATIENT
Start: 2024-11-20

## 2025-01-26 ENCOUNTER — OFFICE VISIT (OUTPATIENT)
Dept: URGENT CARE | Facility: CLINIC | Age: 55
End: 2025-01-26
Payer: COMMERCIAL

## 2025-01-26 VITALS
BODY MASS INDEX: 21.24 KG/M2 | OXYGEN SATURATION: 98 % | SYSTOLIC BLOOD PRESSURE: 105 MMHG | DIASTOLIC BLOOD PRESSURE: 63 MMHG | WEIGHT: 135.6 LBS | RESPIRATION RATE: 16 BRPM | HEART RATE: 100 BPM | TEMPERATURE: 98.2 F

## 2025-01-26 DIAGNOSIS — R68.89 FLU-LIKE SYMPTOMS: Primary | ICD-10-CM

## 2025-01-26 PROCEDURE — 99213 OFFICE O/P EST LOW 20 MIN: CPT | Performed by: FAMILY MEDICINE

## 2025-01-26 PROCEDURE — 87636 SARSCOV2 & INF A&B AMP PRB: CPT | Performed by: FAMILY MEDICINE

## 2025-01-26 RX ORDER — BENZONATATE 200 MG/1
200 CAPSULE ORAL 3 TIMES DAILY PRN
Qty: 20 CAPSULE | Refills: 0 | Status: SHIPPED | OUTPATIENT
Start: 2025-01-26

## 2025-01-26 RX ORDER — OSELTAMIVIR PHOSPHATE 75 MG/1
75 CAPSULE ORAL EVERY 12 HOURS SCHEDULED
Qty: 10 CAPSULE | Refills: 0 | Status: SHIPPED | OUTPATIENT
Start: 2025-01-26 | End: 2025-01-31

## 2025-01-26 NOTE — PROGRESS NOTES
[facility]        NAME: Eleanor Krishnamurthy is a 54 y.o. female  : 1970    MRN: [id number]  DATE: 2025  TIME: 10:19 AM    Assessment and Plan   Flu-like symptoms [R68.89]  1. Flu-like symptoms  Covid/Flu- Office Collect Normal    oseltamivir (TAMIFLU) 75 mg capsule    benzonatate (TESSALON) 200 MG capsule        COVID and flu PCR pending.  Will preemptively treat for influenza with Tamiflu.  Tessalon Perles as needed for cough suppression.  Patient advised on hydrating with plenty of fluids and gargling with warm salt water.    Patient Instructions     Follow up with PCP in 3-5 days.  Proceed to  ER if symptoms worsen.    If tests have been performed at [facility], our office will contact you with results if changes need to be made to the care plan discussed with you at the visit.  [facility].    Chief Complaint     Chief Complaint   Patient presents with    Fever     Reports sore throat, burning cough, fever up to 103.2 F that started yesterday. Would like to r/o Flu.          History of Present Illness       54-year-old female presents today with flulike symptoms which started yesterday.  Reports having generalized myalgias, fatigue, headaches, fevers up to 103 degrees, chills, diaphoresis, nasal symptoms, sore throat and coughing with burning chest pain.  Reports that a number of coworkers were diagnosed with influenza A.    Fever  Associated symptoms include chest pain (burning with cough), chills, congestion, coughing, diaphoresis, fatigue, a fever (103), headaches, myalgias and a sore throat. Pertinent negatives include no abdominal pain or nausea.       Review of Systems   Review of Systems   Constitutional:  Positive for chills, diaphoresis, fatigue and fever (103).   HENT:  Positive for congestion, rhinorrhea and sore throat.    Eyes:  Positive for pain.   Respiratory:  Positive for cough and shortness of breath (chest congestion).     Cardiovascular:  Positive for chest pain (burning with cough).   Gastrointestinal:  Negative for abdominal pain, diarrhea and nausea.   Musculoskeletal:  Positive for myalgias.   Neurological:  Positive for headaches. Negative for dizziness.     Current Medications       Current Outpatient Medications:     amLODIPine (NORVASC) 5 mg tablet, Take 1 tablet (5 mg total) by mouth daily, Disp: 100 tablet, Rfl: 0    benzonatate (TESSALON) 200 MG capsule, Take 1 capsule (200 mg total) by mouth 3 (three) times a day as needed for cough, Disp: 20 capsule, Rfl: 0    Calcium Carb-Cholecalciferol (Calcium 500/D) 500-10 MG-MCG CHEW, Chew 500 mg 3 (three) times a day, Disp: , Rfl:     Collagen-Vitamin C-Biotin (Collagen 1500/C) 500-50-0.8 MG CAPS, Take by mouth, Disp: , Rfl:     Multiple Vitamins-Minerals (BARIATRIC MULTIVITAMINS/IRON PO), Take by mouth, Disp: , Rfl:     oseltamivir (TAMIFLU) 75 mg capsule, Take 1 capsule (75 mg total) by mouth every 12 (twelve) hours for 5 days, Disp: 10 capsule, Rfl: 0    rosuvastatin (CRESTOR) 10 MG tablet, Take 1 tablet (10 mg total) by mouth daily, Disp: 90 tablet, Rfl: 1    fluticasone (FLONASE) 50 mcg/act nasal spray, 1 spray into each nostril daily (Patient not taking: Reported on 1/26/2025), Disp: , Rfl:     levocetirizine (XYZAL) 5 MG tablet, Take 1 tablet (5 mg total) by mouth every evening (Patient not taking: Reported on 1/26/2025), Disp: 90 tablet, Rfl: 1    Current Allergies     Allergies as of 01/26/2025    (No Known Allergies)            The following portions of the patient's history were reviewed and updated as appropriate: allergies, current medications, past family history, past medical history, past social history, past surgical history and problem list.     Past Medical History:   Diagnosis Date    Asthma     Broken tooth     upper right-back    High cholesterol     Hypertension     Morbid obesity with BMI of 45.0-49.9, adult (HCC)     New onset type 2 diabetes mellitus  (HCC) 2022    Pt is not a diabetic any longer    Pain in right knee     MRI on 22    Sleep apnea     no c pap needed       Past Surgical History:   Procedure Laterality Date    ABDOMINAL ADHESION SURGERY N/A 2023    Procedure: LYSIS ADHESIONS;  Surgeon: Sourav Jarvis MD;  Location: [facility];  Service: Bariatrics     SECTION      EGD  2022    food in the stomach-repeat EGD on 22    LAPAROSCOPY  1986    DE ARTHRS KNE SURG W/MENISCECTOMY MED/LAT W/SHVG Right 2022    Procedure: KNEE ARTHROSCOPY  MEDIAL MENISCECTOMY AND CHONDRYLPLASTY;  Surgeon: Jeremy Hawley MD;  Location: [facility];  Service: Orthopedics    DE LAPS GSTR RSTCV PX W/BYP ALEAH-EN-Y LIMB <150 CM N/A 2023    Procedure: LAPAROSCOPIC ALEAH-EN-Y GASTRIC BYPASS AND INTRAOPERATIVE EGD;  Surgeon: Sourav Jarvis MD;  Location: [facility];  Service: Bariatrics    TONSILLECTOMY      WISDOM TOOTH EXTRACTION         Family History   Problem Relation Age of Onset    Hypertension Mother     Stroke Mother     Alzheimer's disease Mother     Diabetes Father     Hypertension Father     Heart disease Father     Thyroid disease Neg Hx          Medications have been verified.        Objective   /63   Pulse 100   Temp 98.2 °F (36.8 °C) (Tympanic)   Resp 16   Wt 61.5 kg (135 lb 9.6 oz)   SpO2 98%   BMI 21.24 kg/m²   No LMP recorded. Patient is postmenopausal.       Physical Exam     Physical Exam  Vitals and nursing note reviewed.   Constitutional:       General: She is in acute distress.      Appearance: Normal appearance. She is normal weight. She is not ill-appearing, toxic-appearing or diaphoretic.   HENT:      Head: Normocephalic and atraumatic.      Nose: Rhinorrhea present. No congestion.      Comments: Inflamed nasal mucosa     Mouth/Throat:      Mouth: Mucous membranes are moist.      Pharynx: Oropharynx is clear. No posterior oropharyngeal erythema.   Eyes:      General:         Right eye: No  discharge.         Left eye: No discharge.      Conjunctiva/sclera: Conjunctivae normal.   Cardiovascular:      Rate and Rhythm: Regular rhythm. Tachycardia present.   Pulmonary:      Effort: Pulmonary effort is normal. No respiratory distress.      Breath sounds: Normal breath sounds. No wheezing, rhonchi or rales.   Skin:     General: Skin is warm.      Findings: No erythema.   Neurological:      General: No focal deficit present.      Mental Status: She is alert and oriented to person, place, and time.   Psychiatric:         Mood and Affect: Mood normal.         Behavior: Behavior normal.         Thought Content: Thought content normal.         Judgment: Judgment normal.

## 2025-01-27 LAB
FLUAV RNA RESP QL NAA+PROBE: POSITIVE
FLUBV RNA RESP QL NAA+PROBE: NEGATIVE
SARS-COV-2 RNA RESP QL NAA+PROBE: NEGATIVE

## 2025-05-20 NOTE — PROGRESS NOTES
[facility]  coding opportunities          Number of diagnosis code(s) already on the problem list added to FYI fla                     Patients insurance company: [address] [place] [address] (Medicare and Commercial for [employer] and [facility])           Please review the following Dx as per the active problem list:    E66 01 Morbid Obesity     If this is correct, please assess and document during your next visit,  No

## (undated) DEVICE — 10 MM BABCOCKS WITH RATCHET HANDLES: Brand: ENDOPATH

## (undated) DEVICE — WEBRIL 6 IN UNSTERILE

## (undated) DEVICE — IRRIG ENDO FLO TUBING

## (undated) DEVICE — ENDO STITCH 2-0 SURGIDAC 48 IN

## (undated) DEVICE — ADHESIVE SKIN CLSR DERMABOND NX

## (undated) DEVICE — INTENDED FOR TISSUE SEPARATION, AND OTHER PROCEDURES THAT REQUIRE A SHARP SURGICAL BLADE TO PUNCTURE OR CUT.: Brand: BARD-PARKER SAFETY BLADES SIZE 11, STERILE

## (undated) DEVICE — SPONGE LAP 18 X 18 IN STRL RFD

## (undated) DEVICE — CHLORAPREP HI-LITE 26ML ORANGE

## (undated) DEVICE — TIBURON EXTREMITY SHEET: Brand: CONVERTORS

## (undated) DEVICE — PMI DISPOSABLE PUNCTURE CLOSURE DEVICE / SUTURE GRASPER: Brand: PMI

## (undated) DEVICE — TIBURON SPLIT SHEET: Brand: CONVERTORS

## (undated) DEVICE — PACK ARTHROSCOPY

## (undated) DEVICE — COVIDIEN ENDO GIA PURPLE (MED) RELOAD 45MM

## (undated) DEVICE — BASIC DOUBLE BASIN 2-LF: Brand: MEDLINE INDUSTRIES, INC.

## (undated) DEVICE — HARMONIC 1100 SHEARS, 36CM SHAFT LENGTH: Brand: HARMONIC

## (undated) DEVICE — TELFA NON-ADHERENT ABSORBENT DRESSING: Brand: TELFA

## (undated) DEVICE — SPONGE GAUZE 4 X 8 12 PLY STRL LF

## (undated) DEVICE — TROCAR: Brand: KII FIOS FIRST ENTRY

## (undated) DEVICE — OR2 STERILE LABELS: Brand: CENTURION

## (undated) DEVICE — VISUALIZATION SYSTEM: Brand: CLEARIFY

## (undated) DEVICE — ENDOPATH XCEL BLADELESS TROCARS WITH STABILITY SLEEVES: Brand: ENDOPATH XCEL

## (undated) DEVICE — DRAPE UTILITY

## (undated) DEVICE — MAYO STAND COVER: Brand: CONVERTORS

## (undated) DEVICE — SMOKE REMOVAL SYSTEM: Brand: BOEHRINGER® SMOKE REMOVAL SYSTEM

## (undated) DEVICE — GLOVE SRG BIOGEL 8

## (undated) DEVICE — OCCLUSIVE GAUZE STRIP,3% BISMUTH TRIBROMOPHENATE IN PETROLATUM BLEND: Brand: XEROFORM

## (undated) DEVICE — KERLIX BANDAGE ROLL: Brand: KERLIX

## (undated) DEVICE — PACK GENERAL LF

## (undated) DEVICE — ELECTRODE LAP SPATULA STR E-Z CLEAN 33CM -0018

## (undated) DEVICE — GLOVE INDICATOR PI UNDERGLOVE SZ 7 BLUE

## (undated) DEVICE — STAPLE RELOAD ENDO EGIA ARTICULATING MEDIUM TAN 2MM.5MM.3MM

## (undated) DEVICE — SUT MONOCRYL 4-0 PS-2 27 IN Y426H

## (undated) DEVICE — INSUFFLATION NEEDLE TO ESTABLISH PNEUMOPERITONEUM.: Brand: INSUFFLATION NEEDLE

## (undated) DEVICE — GLOVE SRG BIOGEL ECLIPSE 7.5

## (undated) DEVICE — GLOVE SRG BIOGEL 7

## (undated) DEVICE — TIBURON LAPAROSCOPIC ABDOMINAL DRAPE: Brand: CONVERTORS

## (undated) DEVICE — [HIGH FLOW INSUFFLATOR,  DO NOT USE IF PACKAGE IS DAMAGED,  KEEP DRY,  KEEP AWAY FROM SUNLIGHT,  PROTECT FROM HEAT AND RADIOACTIVE SOURCES.]: Brand: PNEUMOSURE

## (undated) DEVICE — BLADE SHAVER TORPEDO CRV 4MM 13MM COOLCUT

## (undated) DEVICE — SURGICAL GOWN, XL SMARTSLEEVE: Brand: CONVERTORS

## (undated) DEVICE — POWER SHELL SIGNIA

## (undated) DEVICE — GLOVE INDICATOR PI UNDERGLOVE SZ 7.5 BLUE

## (undated) DEVICE — ENDO STITCH 2-0 VICRYL

## (undated) DEVICE — GLOVE INDICATOR PI UNDERGLOVE SZ 8 BLUE

## (undated) DEVICE — GLOVE INDICATOR PI UNDERGLOVE SZ 6.5 BLUE

## (undated) DEVICE — ENDO STITCH DEVICE 10 MM

## (undated) DEVICE — TUBING ARTHROSCOPIC WAVE  MAIN PUMP

## (undated) DEVICE — ACE WRAP 6 IN STERILE

## (undated) DEVICE — ARTHROSCOPY FLOOR MAT

## (undated) DEVICE — 3M™ TEGADERM™ TRANSPARENT FILM DRESSING FRAME STYLE, 1626W, 4 IN X 4-3/4 IN (10 CM X 12 CM), 50/CT 4CT/CASE: Brand: 3M™ TEGADERM™

## (undated) DEVICE — DRAPE EQUIPMENT RF WAND

## (undated) DEVICE — SUT VICRYL 0 18 IN J906G

## (undated) DEVICE — SYRINGE 20ML LL

## (undated) DEVICE — 34" SINGLE PATIENT USE HOVERMATT: Brand: SINGLE PATIENT USE HOVERMATT

## (undated) DEVICE — NEEDLE SPINAL 20G X 3.5 LF

## (undated) DEVICE — GLOVE SRG BIOGEL 7.5

## (undated) DEVICE — SHEARS MONOPOL MINI 5MM X 31CM RATCHET HNDL

## (undated) DEVICE — COVIDIEN ENDO GIA PURPLE (MED) RELOAD 60MM

## (undated) DEVICE — FABRIC REINFORCED, SURGICAL GOWN, XL: Brand: CONVERTORS

## (undated) DEVICE — GLOVE SRG BIOGEL 6.5